# Patient Record
Sex: FEMALE | Race: WHITE | Employment: OTHER | URBAN - METROPOLITAN AREA
[De-identification: names, ages, dates, MRNs, and addresses within clinical notes are randomized per-mention and may not be internally consistent; named-entity substitution may affect disease eponyms.]

---

## 2017-01-04 ENCOUNTER — OFFICE VISIT (OUTPATIENT)
Dept: FAMILY MEDICINE CLINIC | Facility: CLINIC | Age: 77
End: 2017-01-04

## 2017-01-04 VITALS
HEART RATE: 88 BPM | SYSTOLIC BLOOD PRESSURE: 120 MMHG | RESPIRATION RATE: 14 BRPM | DIASTOLIC BLOOD PRESSURE: 66 MMHG | BODY MASS INDEX: 34 KG/M2 | WEIGHT: 198.38 LBS

## 2017-01-04 DIAGNOSIS — S80.02XA CONTUSION OF LEFT KNEE, INITIAL ENCOUNTER: Primary | ICD-10-CM

## 2017-01-04 PROBLEM — S52.614A CLOSED NONDISPLACED FRACTURE OF STYLOID PROCESS OF RIGHT ULNA, INITIAL ENCOUNTER: Status: RESOLVED | Noted: 2017-01-04 | Resolved: 2017-01-04

## 2017-01-04 PROBLEM — S52.601A CLOSED FRACTURE OF DISTAL END OF RIGHT ULNA, UNSPECIFIED FRACTURE MORPHOLOGY, INITIAL ENCOUNTER: Status: ACTIVE | Noted: 2017-01-04

## 2017-01-04 PROBLEM — S52.614A CLOSED NONDISPLACED FRACTURE OF STYLOID PROCESS OF RIGHT ULNA, INITIAL ENCOUNTER: Status: ACTIVE | Noted: 2017-01-04

## 2017-01-04 PROCEDURE — 99213 OFFICE O/P EST LOW 20 MIN: CPT | Performed by: FAMILY MEDICINE

## 2017-01-04 NOTE — PROGRESS NOTES
Patient presents with:  Knee Pain: fell last wednesday 12/28/16 and pain has been bad ever since- fractured right ulna taking Ibuprofen 600 mg - only works short term- pain keeping Pt up at night     HPI:   Mena Laughlin is a 77year old female who pr

## 2017-01-25 PROBLEM — M11.231 PSEUDOGOUT OF RIGHT WRIST: Status: ACTIVE | Noted: 2017-01-25

## 2017-02-22 PROBLEM — M65.332 TRIGGER MIDDLE FINGER OF LEFT HAND: Status: ACTIVE | Noted: 2017-02-22

## 2017-04-12 PROBLEM — G56.01 RIGHT CARPAL TUNNEL SYNDROME: Status: ACTIVE | Noted: 2017-04-12

## 2017-04-21 ENCOUNTER — PRIOR ORIGINAL RECORDS (OUTPATIENT)
Dept: OTHER | Age: 77
End: 2017-04-21

## 2017-06-16 ENCOUNTER — TELEPHONE (OUTPATIENT)
Dept: FAMILY MEDICINE CLINIC | Facility: CLINIC | Age: 77
End: 2017-06-16

## 2017-06-16 NOTE — TELEPHONE ENCOUNTER
LM for patient to call back to complete her Annual Health Assessment form prior to her appt on 6/22/17 with Dr. Thomas Hamilton

## 2017-06-22 ENCOUNTER — OFFICE VISIT (OUTPATIENT)
Dept: FAMILY MEDICINE CLINIC | Facility: CLINIC | Age: 77
End: 2017-06-22

## 2017-06-22 VITALS
TEMPERATURE: 98 F | DIASTOLIC BLOOD PRESSURE: 60 MMHG | BODY MASS INDEX: 34.27 KG/M2 | WEIGHT: 195.81 LBS | RESPIRATION RATE: 14 BRPM | SYSTOLIC BLOOD PRESSURE: 100 MMHG | HEART RATE: 88 BPM | HEIGHT: 63.2 IN

## 2017-06-22 DIAGNOSIS — E78.00 PURE HYPERCHOLESTEROLEMIA: ICD-10-CM

## 2017-06-22 DIAGNOSIS — Z80.3 FAMILY HISTORY OF BREAST CANCER IN MOTHER: ICD-10-CM

## 2017-06-22 DIAGNOSIS — Z23 NEED FOR VACCINATION: ICD-10-CM

## 2017-06-22 DIAGNOSIS — Z13.31 DEPRESSION SCREENING: ICD-10-CM

## 2017-06-22 DIAGNOSIS — M25.561 CHRONIC PAIN OF BOTH KNEES: ICD-10-CM

## 2017-06-22 DIAGNOSIS — G89.29 CHRONIC PAIN OF BOTH KNEES: ICD-10-CM

## 2017-06-22 DIAGNOSIS — Z12.31 VISIT FOR SCREENING MAMMOGRAM: ICD-10-CM

## 2017-06-22 DIAGNOSIS — M25.562 CHRONIC PAIN OF BOTH KNEES: ICD-10-CM

## 2017-06-22 DIAGNOSIS — Z00.00 ENCOUNTER FOR ANNUAL HEALTH EXAMINATION: Primary | ICD-10-CM

## 2017-06-22 PROCEDURE — G0439 PPPS, SUBSEQ VISIT: HCPCS | Performed by: FAMILY MEDICINE

## 2017-06-22 PROCEDURE — G0444 DEPRESSION SCREEN ANNUAL: HCPCS | Performed by: FAMILY MEDICINE

## 2017-06-22 RX ORDER — EFINACONAZOLE 100 MG/ML
SOLUTION TOPICAL DAILY
COMMUNITY
End: 2019-06-25 | Stop reason: ALTCHOICE

## 2017-06-22 RX ORDER — RALOXIFENE HYDROCHLORIDE 60 MG/1
60 TABLET, FILM COATED ORAL
Qty: 90 TABLET | Refills: 3 | Status: SHIPPED | OUTPATIENT
Start: 2017-06-22 | End: 2018-06-25

## 2017-06-22 RX ORDER — CELECOXIB 200 MG/1
200 CAPSULE ORAL
Qty: 90 CAPSULE | Refills: 3 | Status: SHIPPED | OUTPATIENT
Start: 2017-06-22 | End: 2017-09-30

## 2017-06-22 NOTE — PATIENT INSTRUCTIONS
Lela Espana's SCREENING SCHEDULE   Tests on this list are recommended by your physician but may not be covered, or covered at this frequency, by your insurer. Please check with your insurance carrier before scheduling to verify coverage.    PREVENT in their lifetime   • Anyone with a family history    Colorectal Cancer Screening  Covered up to Age 76     Colonoscopy Screen   Covered every 10 years- more often if abnormal Colonoscopy,10 Years due on 10/23/2024 Update [facility] if applicable 12/06/16  -FLU VACC PRSV FREE INC ANTIG   Orders placed or performed in visit on 10/20/14  -FLU VACC PRSV FREE INC ANTIG    Please get every year    Pneumococcal 13 (Prevnar)  Covered Once after 65 No orders found for this or any previous visit.  Please get

## 2017-06-22 NOTE — PROGRESS NOTES
HPI:   Odin Barrera is a 77year old female who presents for a Medicare Subsequent Annual Wellness visit (Pt already had Initial Annual Wellness). Preventative  Breast: normal a year ago. Due again. Colon: 2014. Recommend no more screening. 208* 06/23/2016          Last Chemistry Labs:     Lab Results  Component Value Date   AST 37 06/23/2016   ALT 47 06/23/2016   CA 8.9 06/23/2016   ALB 3.8 06/23/2016   TSH 4.460 06/11/2015   CREATSERUM 0.68 06/23/2016   GLU 90 06/23/2016        CBC  (most r Other in her father; Ovarian Cancer (age of onset: 61) in her maternal cousin female; Stroke in her maternal grandmother; alzheimer's in her maternal grandfather; diverticulosis in her brother. SOCIAL HISTORY:   She  reports that she has never smoked.  Sh Supple, symmetrical, trachea midline, no adenopathy, thyroid: not enlarged, symmetric, no tenderness/mass/nodules   Lungs:   Clear to auscultation bilaterally, respirations unlabored   Chest Wall:  No tenderness or deformity   Heart:  Regular rate and rhyt tablet; Refill: 3    5. Chronic pain of both knees  Worsening  Stay active  Encouraged to see ortho if the pains continue to bother her - known OA in medial bilaterally. - celecoxib (CELEBREX) 200 MG Oral Cap;  Take 1 capsule (200 mg total) by mouth once money/bills: Able without help    Taking medications as prescribed: Able without help    Are you able to afford your medications?: Yes    Hearing Problems?: No     Functional Status     Hearing Problems?: No    Vision Problems? 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

## 2017-07-03 ENCOUNTER — APPOINTMENT (OUTPATIENT)
Dept: LAB | Age: 77
End: 2017-07-03
Attending: FAMILY MEDICINE
Payer: MEDICARE

## 2017-07-03 ENCOUNTER — HOSPITAL ENCOUNTER (OUTPATIENT)
Dept: MAMMOGRAPHY | Facility: HOSPITAL | Age: 77
Discharge: HOME | End: 2017-07-03
Attending: FAMILY MEDICINE
Payer: MEDICARE

## 2017-07-03 DIAGNOSIS — E78.00 PURE HYPERCHOLESTEROLEMIA: ICD-10-CM

## 2017-07-03 DIAGNOSIS — Z12.31 VISIT FOR SCREENING MAMMOGRAM: ICD-10-CM

## 2017-07-03 LAB
ALBUMIN SERPL-MCNC: 3.9 G/DL (ref 3.5–4.8)
ALP LIVER SERPL-CCNC: 53 U/L (ref 55–142)
ALT SERPL-CCNC: 31 U/L (ref 14–54)
AST SERPL-CCNC: 28 U/L (ref 15–41)
BILIRUB SERPL-MCNC: 0.4 MG/DL (ref 0.1–2)
BUN BLD-MCNC: 14 MG/DL (ref 8–20)
CALCIUM BLD-MCNC: 9.3 MG/DL (ref 8.3–10.3)
CHLORIDE: 105 MMOL/L (ref 101–111)
CHOLEST SMN-MCNC: 241 MG/DL (ref ?–200)
CO2: 28 MMOL/L (ref 22–32)
CREAT BLD-MCNC: 0.83 MG/DL (ref 0.55–1.02)
GLUCOSE BLD-MCNC: 88 MG/DL (ref 70–99)
HDLC SERPL-MCNC: 50 MG/DL (ref 45–?)
HDLC SERPL: 4.82 {RATIO} (ref ?–4.44)
LDLC SERPL CALC-MCNC: 144 MG/DL (ref ?–130)
M PROTEIN MFR SERPL ELPH: 7.6 G/DL (ref 6.1–8.3)
NONHDLC SERPL-MCNC: 191 MG/DL (ref ?–130)
POTASSIUM SERPL-SCNC: 4.2 MMOL/L (ref 3.6–5.1)
SODIUM SERPL-SCNC: 139 MMOL/L (ref 136–144)
TRIGLYCERIDES: 236 MG/DL (ref ?–150)
VLDL: 47 MG/DL (ref 5–40)

## 2017-07-03 PROCEDURE — 36415 COLL VENOUS BLD VENIPUNCTURE: CPT

## 2017-07-03 PROCEDURE — 77067 SCR MAMMO BI INCL CAD: CPT | Performed by: FAMILY MEDICINE

## 2017-07-03 PROCEDURE — 80053 COMPREHEN METABOLIC PANEL: CPT

## 2017-07-03 PROCEDURE — 80061 LIPID PANEL: CPT

## 2017-07-17 ENCOUNTER — TELEPHONE (OUTPATIENT)
Dept: FAMILY MEDICINE CLINIC | Facility: CLINIC | Age: 77
End: 2017-07-17

## 2017-07-17 NOTE — TELEPHONE ENCOUNTER
Called Pt to inquire about the Prevnar 13 order still pending. Pt says back on 06/22/17 when Pt was here for appt, She and Dr Gabriel Lindsay discussed the vaccine, but them later Dr Gabriel Lindsay told Pt she was all done with her visit, so Pt got up and left.  Pt was unaw

## 2017-07-18 ENCOUNTER — NURSE ONLY (OUTPATIENT)
Dept: FAMILY MEDICINE CLINIC | Facility: CLINIC | Age: 77
End: 2017-07-18

## 2017-07-18 VITALS — TEMPERATURE: 99 F

## 2017-07-18 PROCEDURE — G0009 ADMIN PNEUMOCOCCAL VACCINE: HCPCS | Performed by: FAMILY MEDICINE

## 2017-07-18 PROCEDURE — 90670 PCV13 VACCINE IM: CPT | Performed by: FAMILY MEDICINE

## 2017-07-18 NOTE — PROGRESS NOTES
Patient came into the office today for the Prevnar 13 vaccine. Gave patient the IVS for the Prevnar 13 vaccine for patient to review. After patient reviewed IVS I asked patient if she has any questions regarding the Prevnar 13 vaccine.  Patient said that

## 2017-08-18 ENCOUNTER — TELEPHONE (OUTPATIENT)
Dept: FAMILY MEDICINE CLINIC | Facility: CLINIC | Age: 77
End: 2017-08-18

## 2017-08-18 NOTE — TELEPHONE ENCOUNTER
Received fax from Dr. Jose Tay office, patient having left knee replacement 09/28/17, H&P required.  Form in triage

## 2017-09-11 ENCOUNTER — OFFICE VISIT (OUTPATIENT)
Dept: FAMILY MEDICINE CLINIC | Facility: CLINIC | Age: 77
End: 2017-09-11

## 2017-09-11 VITALS
WEIGHT: 199 LBS | HEIGHT: 64 IN | SYSTOLIC BLOOD PRESSURE: 120 MMHG | HEART RATE: 84 BPM | DIASTOLIC BLOOD PRESSURE: 60 MMHG | TEMPERATURE: 98 F | BODY MASS INDEX: 33.97 KG/M2

## 2017-09-11 DIAGNOSIS — D32.9 MENINGIOMA (HCC): ICD-10-CM

## 2017-09-11 DIAGNOSIS — Z01.818 PREOP EXAMINATION: Primary | ICD-10-CM

## 2017-09-11 DIAGNOSIS — M17.12 ARTHRITIS OF LEFT KNEE: ICD-10-CM

## 2017-09-11 DIAGNOSIS — E78.00 PURE HYPERCHOLESTEROLEMIA: ICD-10-CM

## 2017-09-11 DIAGNOSIS — C83.32 DIFFUSE LARGE B-CELL LYMPHOMA OF INTRATHORACIC LYMPH NODES (HCC): ICD-10-CM

## 2017-09-11 PROCEDURE — 99214 OFFICE O/P EST MOD 30 MIN: CPT | Performed by: FAMILY MEDICINE

## 2017-09-11 NOTE — PROGRESS NOTES
Patient presents with:  Pre-Op Exam: pt neds pre op clearance for left knee replaceent on 9/28/17.   Pt will have ekg and labs done at hospital  Chest Pressure: pt wants to discuss one instance two weeks ago of chest pressure and difficulty breathing while daily. Disp: 90 capsule Rfl: 3   triamcinolone acetonide (KENALOG) 0.1 % Apply Externally Cream Apply  topically 2 (two) times daily as needed. Disp: 60 g Rfl: 1   aspirin 81 MG Oral Tab Take 81 mg by mouth daily.  Disp:  Rfl:    Omega-3 Fatty Acids (FISH O Smoking status: Never Smoker    Smokeless tobacco: Never Used    Alcohol use No    Drug use: No    Sexual activity: Not on file     Other Topics Concern    Caffeine Concern Yes    Comment: 1 cup daily decaf    Exercise No    Seat Belt Yes    Self-Exams Yes handicapped placard because knee surgeries for herself and ). 1. Preop examination  Needs preop blood work completed prior to surgical clearance  Medically her chronic conditions are controlled  Being monitored regularly for the [facility].   Gary

## 2017-09-18 ENCOUNTER — APPOINTMENT (OUTPATIENT)
Dept: LAB | Facility: HOSPITAL | Age: 77
End: 2017-09-18
Attending: ORTHOPAEDIC SURGERY
Payer: MEDICARE

## 2017-09-18 ENCOUNTER — HOSPITAL ENCOUNTER (OUTPATIENT)
Dept: PHYSICAL THERAPY | Facility: HOSPITAL | Age: 77
Discharge: HOME | End: 2017-09-18
Attending: ORTHOPAEDIC SURGERY
Payer: MEDICARE

## 2017-09-18 DIAGNOSIS — M17.12 OSTEOARTHRITIS OF LEFT KNEE: ICD-10-CM

## 2017-09-18 LAB
ALBUMIN SERPL-MCNC: 3.6 G/DL (ref 3.5–4.8)
ALP LIVER SERPL-CCNC: 57 U/L (ref 55–142)
ALT SERPL-CCNC: 29 U/L (ref 14–54)
ANTIBODY SCREEN: NEGATIVE
APTT PPP: 36.1 SECONDS (ref 25–34)
AST SERPL-CCNC: 29 U/L (ref 15–41)
ATRIAL RATE: 81 BPM
BASOPHILS # BLD AUTO: 0.05 X10(3) UL (ref 0–0.1)
BASOPHILS NFR BLD AUTO: 0.9 %
BILIRUB SERPL-MCNC: 0.4 MG/DL (ref 0.1–2)
BILIRUB UR QL STRIP.AUTO: NEGATIVE
BUN BLD-MCNC: 12 MG/DL (ref 8–20)
CALCIUM BLD-MCNC: 9.2 MG/DL (ref 8.3–10.3)
CHLORIDE: 106 MMOL/L (ref 101–111)
CO2: 24 MMOL/L (ref 22–32)
COLOR UR AUTO: YELLOW
CREAT BLD-MCNC: 0.65 MG/DL (ref 0.55–1.02)
EOSINOPHIL # BLD AUTO: 0.25 X10(3) UL (ref 0–0.3)
EOSINOPHIL NFR BLD AUTO: 4.5 %
ERYTHROCYTE [DISTWIDTH] IN BLOOD BY AUTOMATED COUNT: 12.8 % (ref 11.5–16)
GLUCOSE BLD-MCNC: 90 MG/DL (ref 70–99)
GLUCOSE UR STRIP.AUTO-MCNC: NEGATIVE MG/DL
HCT VFR BLD AUTO: 41.3 % (ref 34–50)
HGB BLD-MCNC: 13.8 G/DL (ref 12–16)
IMMATURE GRANULOCYTE COUNT: 0.02 X10(3) UL (ref 0–1)
IMMATURE GRANULOCYTE RATIO %: 0.4 %
INR BLD: 1.05 (ref 0.89–1.11)
KETONES UR STRIP.AUTO-MCNC: NEGATIVE MG/DL
LYMPHOCYTES # BLD AUTO: 1.29 X10(3) UL (ref 0.9–4)
LYMPHOCYTES NFR BLD AUTO: 23.3 %
M PROTEIN MFR SERPL ELPH: 7.3 G/DL (ref 6.1–8.3)
MCH RBC QN AUTO: 30.6 PG (ref 27–33.2)
MCHC RBC AUTO-ENTMCNC: 33.4 G/DL (ref 31–37)
MCV RBC AUTO: 91.6 FL (ref 81–100)
MONOCYTES # BLD AUTO: 0.32 X10(3) UL (ref 0.1–0.6)
MONOCYTES NFR BLD AUTO: 5.8 %
NEUTROPHIL ABS PRELIM: 3.6 X10 (3) UL (ref 1.3–6.7)
NEUTROPHILS # BLD AUTO: 3.6 X10(3) UL (ref 1.3–6.7)
NEUTROPHILS NFR BLD AUTO: 65.1 %
NITRITE UR QL STRIP.AUTO: NEGATIVE
P AXIS: 31 DEGREES
P-R INTERVAL: 150 MS
PH UR STRIP.AUTO: 6 [PH] (ref 4.5–8)
PLATELET # BLD AUTO: 191 10(3)UL (ref 150–450)
POTASSIUM SERPL-SCNC: 4.1 MMOL/L (ref 3.6–5.1)
PROT UR STRIP.AUTO-MCNC: NEGATIVE MG/DL
PSA SERPL DL<=0.01 NG/ML-MCNC: 13.7 SECONDS (ref 12–14.3)
Q-T INTERVAL: 382 MS
QRS DURATION: 84 MS
QTC CALCULATION (BEZET): 443 MS
R AXIS: -10 DEGREES
RBC # BLD AUTO: 4.51 X10(6)UL (ref 3.8–5.1)
RBC UR QL AUTO: NEGATIVE
RED CELL DISTRIBUTION WIDTH-SD: 43.1 FL (ref 35.1–46.3)
RH BLOOD TYPE: NEGATIVE
SODIUM SERPL-SCNC: 138 MMOL/L (ref 136–144)
SP GR UR STRIP.AUTO: 1.02 (ref 1–1.03)
T AXIS: 41 DEGREES
UROBILINOGEN UR STRIP.AUTO-MCNC: <2 MG/DL
VENTRICULAR RATE: 81 BPM
WBC # BLD AUTO: 5.5 X10(3) UL (ref 4–13)

## 2017-09-18 PROCEDURE — 85025 COMPLETE CBC W/AUTO DIFF WBC: CPT

## 2017-09-18 PROCEDURE — 86901 BLOOD TYPING SEROLOGIC RH(D): CPT

## 2017-09-18 PROCEDURE — 85610 PROTHROMBIN TIME: CPT

## 2017-09-18 PROCEDURE — 36415 COLL VENOUS BLD VENIPUNCTURE: CPT

## 2017-09-18 PROCEDURE — 85730 THROMBOPLASTIN TIME PARTIAL: CPT

## 2017-09-18 PROCEDURE — 87081 CULTURE SCREEN ONLY: CPT

## 2017-09-18 PROCEDURE — 86850 RBC ANTIBODY SCREEN: CPT

## 2017-09-18 PROCEDURE — 87086 URINE CULTURE/COLONY COUNT: CPT

## 2017-09-18 PROCEDURE — 93005 ELECTROCARDIOGRAM TRACING: CPT

## 2017-09-18 PROCEDURE — 80053 COMPREHEN METABOLIC PANEL: CPT

## 2017-09-18 PROCEDURE — 81001 URINALYSIS AUTO W/SCOPE: CPT

## 2017-09-18 PROCEDURE — 93010 ELECTROCARDIOGRAM REPORT: CPT | Performed by: INTERNAL MEDICINE

## 2017-09-18 PROCEDURE — 86900 BLOOD TYPING SEROLOGIC ABO: CPT

## 2017-09-19 PROBLEM — M17.12 PRIMARY OSTEOARTHRITIS OF LEFT KNEE: Status: ACTIVE | Noted: 2017-09-19

## 2017-09-27 NOTE — H&P
[address] [facility] Patient Status:  Surgery Admit    1940 MRN [id number]   [facility] SURGERY Attending Justice Hernandez MD   Hosp Day # 0 PCP Moe Godfrey MD     Date of Admission:  (Not on smoked. She has never used smokeless tobacco. She reports that she does not drink alcohol or use drugs.     Allergies:    Cleocin [Clindamyci*    Diarrhea  Adhesive Tape           Rash  Ampicillin              Rash  Neosporin [Neomycin*    Rash    Home Medi

## 2017-09-28 ENCOUNTER — SURGERY (OUTPATIENT)
Age: 77
End: 2017-09-28

## 2017-09-28 ENCOUNTER — HOSPITAL ENCOUNTER (INPATIENT)
Facility: HOSPITAL | Age: 77
LOS: 2 days | Discharge: HOME HEALTH | DRG: 470 | End: 2017-09-30
Attending: ORTHOPAEDIC SURGERY | Admitting: ORTHOPAEDIC SURGERY
Payer: MEDICARE

## 2017-09-28 DIAGNOSIS — M17.12 OSTEOARTHRITIS OF LEFT KNEE: Primary | ICD-10-CM

## 2017-09-28 PROCEDURE — 99233 SBSQ HOSP IP/OBS HIGH 50: CPT | Performed by: HOSPITALIST

## 2017-09-28 PROCEDURE — 3E0T3CZ INTRODUCTION OF REGIONAL ANESTHETIC INTO PERIPHERAL NERVES AND PLEXI, PERCUTANEOUS APPROACH: ICD-10-PCS | Performed by: ANESTHESIOLOGY

## 2017-09-28 PROCEDURE — 0SRD0J9 REPLACEMENT OF LEFT KNEE JOINT WITH SYNTHETIC SUBSTITUTE, CEMENTED, OPEN APPROACH: ICD-10-PCS | Performed by: ORTHOPAEDIC SURGERY

## 2017-09-28 DEVICE — SIGMA LCS HIGH PERFORMANCE STERILE THREADED HEADED PINS
Type: IMPLANTABLE DEVICE | Site: KNEE | Status: FUNCTIONAL
Brand: SIGMA LCS HIGH PERFORMANCE

## 2017-09-28 DEVICE — ATTUNE PATELLA MEDIALIZED DOME 38MM CEMENTED AOX
Type: IMPLANTABLE DEVICE | Site: KNEE | Status: FUNCTIONAL
Brand: ATTUNE

## 2017-09-28 DEVICE — SIGMA LCS HIGH PERFORMANCE INSTRUMENTS STERILE THREADED PINS
Type: IMPLANTABLE DEVICE | Site: KNEE | Status: FUNCTIONAL
Brand: SIGMA LCS HIGH PERFORMANCE

## 2017-09-28 DEVICE — ATTUNE KNEE SYSTEM FEMORAL POSTERIOR STABILIZED SIZE 6 LEFT CEMENTED
Type: IMPLANTABLE DEVICE | Site: KNEE | Status: FUNCTIONAL
Brand: ATTUNE

## 2017-09-28 DEVICE — SMARTSET HV HIGH VISCOSITY BONE CEMENT 40G
Type: IMPLANTABLE DEVICE | Site: KNEE | Status: FUNCTIONAL
Brand: SMARTSET

## 2017-09-28 DEVICE — ATTUNE KNEE SYSTEM TIBIAL INSERT ROTATING PLATFORM POSTERIOR STABILIZED 6 7MM AOX
Type: IMPLANTABLE DEVICE | Site: KNEE | Status: FUNCTIONAL
Brand: ATTUNE

## 2017-09-28 DEVICE — ATTUNE KNEE SYSTEM TIBIAL BASE ROTATING PLATFORM SIZE 6 CEMENTED
Type: IMPLANTABLE DEVICE | Site: KNEE | Status: FUNCTIONAL
Brand: ATTUNE

## 2017-09-28 RX ORDER — HYDROMORPHONE HYDROCHLORIDE 1 MG/ML
0.4 INJECTION, SOLUTION INTRAMUSCULAR; INTRAVENOUS; SUBCUTANEOUS EVERY 2 HOUR PRN
Status: ACTIVE | OUTPATIENT
Start: 2017-09-28 | End: 2017-09-30

## 2017-09-28 RX ORDER — SODIUM PHOSPHATE, DIBASIC AND SODIUM PHOSPHATE, MONOBASIC 7; 19 G/133ML; G/133ML
1 ENEMA RECTAL ONCE AS NEEDED
Status: DISCONTINUED | OUTPATIENT
Start: 2017-09-28 | End: 2017-09-30

## 2017-09-28 RX ORDER — MELATONIN
325
Status: DISCONTINUED | OUTPATIENT
Start: 2017-09-29 | End: 2017-09-30

## 2017-09-28 RX ORDER — OXYCODONE HCL 10 MG/1
10 TABLET, FILM COATED, EXTENDED RELEASE ORAL
Status: COMPLETED | OUTPATIENT
Start: 2017-09-28 | End: 2017-09-28

## 2017-09-28 RX ORDER — ONDANSETRON 2 MG/ML
4 INJECTION INTRAMUSCULAR; INTRAVENOUS EVERY 4 HOURS PRN
Status: DISCONTINUED | OUTPATIENT
Start: 2017-09-28 | End: 2017-09-30

## 2017-09-28 RX ORDER — METOCLOPRAMIDE HYDROCHLORIDE 5 MG/ML
10 INJECTION INTRAMUSCULAR; INTRAVENOUS AS NEEDED
Status: DISCONTINUED | OUTPATIENT
Start: 2017-09-28 | End: 2017-09-28 | Stop reason: HOSPADM

## 2017-09-28 RX ORDER — MEPERIDINE HYDROCHLORIDE 25 MG/ML
12.5 INJECTION INTRAMUSCULAR; INTRAVENOUS; SUBCUTANEOUS AS NEEDED
Status: COMPLETED | OUTPATIENT
Start: 2017-09-28 | End: 2017-09-28

## 2017-09-28 RX ORDER — ONDANSETRON 2 MG/ML
4 INJECTION INTRAMUSCULAR; INTRAVENOUS AS NEEDED
Status: DISCONTINUED | OUTPATIENT
Start: 2017-09-28 | End: 2017-09-28 | Stop reason: HOSPADM

## 2017-09-28 RX ORDER — POLYETHYLENE GLYCOL 3350 17 G/17G
17 POWDER, FOR SOLUTION ORAL DAILY PRN
Status: DISCONTINUED | OUTPATIENT
Start: 2017-09-28 | End: 2017-09-30

## 2017-09-28 RX ORDER — BISACODYL 10 MG
10 SUPPOSITORY, RECTAL RECTAL
Status: DISCONTINUED | OUTPATIENT
Start: 2017-09-28 | End: 2017-09-30

## 2017-09-28 RX ORDER — DIPHENHYDRAMINE HYDROCHLORIDE 50 MG/ML
12.5 INJECTION INTRAMUSCULAR; INTRAVENOUS EVERY 4 HOURS PRN
Status: DISCONTINUED | OUTPATIENT
Start: 2017-09-28 | End: 2017-09-30

## 2017-09-28 RX ORDER — SENNOSIDES 8.6 MG
17.2 TABLET ORAL NIGHTLY
Status: DISCONTINUED | OUTPATIENT
Start: 2017-09-28 | End: 2017-09-30

## 2017-09-28 RX ORDER — METOCLOPRAMIDE HYDROCHLORIDE 5 MG/ML
10 INJECTION INTRAMUSCULAR; INTRAVENOUS EVERY 6 HOURS PRN
Status: ACTIVE | OUTPATIENT
Start: 2017-09-28 | End: 2017-09-30

## 2017-09-28 RX ORDER — HYDROCODONE BITARTRATE AND ACETAMINOPHEN 10; 325 MG/1; MG/1
1-2 TABLET ORAL EVERY 4 HOURS PRN
Qty: 80 TABLET | Refills: 0 | Status: SHIPPED | OUTPATIENT
Start: 2017-09-28 | End: 2018-06-25

## 2017-09-28 RX ORDER — DIPHENHYDRAMINE HYDROCHLORIDE 50 MG/ML
25 INJECTION INTRAMUSCULAR; INTRAVENOUS ONCE AS NEEDED
Status: ACTIVE | OUTPATIENT
Start: 2017-09-28 | End: 2017-09-28

## 2017-09-28 RX ORDER — SODIUM CHLORIDE, SODIUM LACTATE, POTASSIUM CHLORIDE, CALCIUM CHLORIDE 600; 310; 30; 20 MG/100ML; MG/100ML; MG/100ML; MG/100ML
INJECTION, SOLUTION INTRAVENOUS CONTINUOUS
Status: DISCONTINUED | OUTPATIENT
Start: 2017-09-28 | End: 2017-09-30

## 2017-09-28 RX ORDER — OXYCODONE HYDROCHLORIDE 15 MG/1
15 TABLET ORAL EVERY 4 HOURS PRN
Status: DISCONTINUED | OUTPATIENT
Start: 2017-09-28 | End: 2017-09-29

## 2017-09-28 RX ORDER — ACETAMINOPHEN 500 MG
500 TABLET ORAL EVERY 6 HOURS PRN
COMMUNITY
End: 2017-10-04

## 2017-09-28 RX ORDER — NALOXONE HYDROCHLORIDE 0.4 MG/ML
80 INJECTION, SOLUTION INTRAMUSCULAR; INTRAVENOUS; SUBCUTANEOUS AS NEEDED
Status: DISCONTINUED | OUTPATIENT
Start: 2017-09-28 | End: 2017-09-28 | Stop reason: HOSPADM

## 2017-09-28 RX ORDER — DIPHENHYDRAMINE HCL 25 MG
25 CAPSULE ORAL EVERY 4 HOURS PRN
Status: DISCONTINUED | OUTPATIENT
Start: 2017-09-28 | End: 2017-09-30

## 2017-09-28 RX ORDER — OXYCODONE HCL 10 MG/1
10 TABLET, FILM COATED, EXTENDED RELEASE ORAL
Status: DISCONTINUED | OUTPATIENT
Start: 2017-09-28 | End: 2017-09-29

## 2017-09-28 RX ORDER — OXYCODONE HYDROCHLORIDE 5 MG/1
5 TABLET ORAL EVERY 4 HOURS PRN
Status: DISCONTINUED | OUTPATIENT
Start: 2017-09-28 | End: 2017-09-29

## 2017-09-28 RX ORDER — DEXAMETHASONE SODIUM PHOSPHATE 4 MG/ML
4 VIAL (ML) INJECTION AS NEEDED
Status: DISCONTINUED | OUTPATIENT
Start: 2017-09-28 | End: 2017-09-28 | Stop reason: HOSPADM

## 2017-09-28 RX ORDER — HYDROMORPHONE HYDROCHLORIDE 1 MG/ML
0.4 INJECTION, SOLUTION INTRAMUSCULAR; INTRAVENOUS; SUBCUTANEOUS EVERY 5 MIN PRN
Status: DISCONTINUED | OUTPATIENT
Start: 2017-09-28 | End: 2017-09-28 | Stop reason: HOSPADM

## 2017-09-28 RX ORDER — KETOROLAC TROMETHAMINE 30 MG/ML
15 INJECTION, SOLUTION INTRAMUSCULAR; INTRAVENOUS EVERY 6 HOURS
Status: COMPLETED | OUTPATIENT
Start: 2017-09-28 | End: 2017-09-29

## 2017-09-28 RX ORDER — DOCUSATE SODIUM 100 MG/1
100 CAPSULE, LIQUID FILLED ORAL 2 TIMES DAILY
Status: DISCONTINUED | OUTPATIENT
Start: 2017-09-28 | End: 2017-09-30

## 2017-09-28 RX ORDER — HYDROMORPHONE HYDROCHLORIDE 1 MG/ML
0.2 INJECTION, SOLUTION INTRAMUSCULAR; INTRAVENOUS; SUBCUTANEOUS EVERY 2 HOUR PRN
Status: ACTIVE | OUTPATIENT
Start: 2017-09-28 | End: 2017-09-30

## 2017-09-28 RX ORDER — ACETAMINOPHEN 325 MG/1
TABLET ORAL
Status: COMPLETED
Start: 2017-09-28 | End: 2017-09-28

## 2017-09-28 RX ORDER — OXYCODONE HYDROCHLORIDE 10 MG/1
10 TABLET ORAL EVERY 4 HOURS PRN
Status: DISCONTINUED | OUTPATIENT
Start: 2017-09-28 | End: 2017-09-29

## 2017-09-28 RX ORDER — SCOLOPAMINE TRANSDERMAL SYSTEM 1 MG/1
1 PATCH, EXTENDED RELEASE TRANSDERMAL ONCE
Status: DISCONTINUED | OUTPATIENT
Start: 2017-09-28 | End: 2017-09-30

## 2017-09-28 RX ORDER — ACETAMINOPHEN 325 MG/1
650 TABLET ORAL 4 TIMES DAILY
Status: DISCONTINUED | OUTPATIENT
Start: 2017-09-28 | End: 2017-09-29

## 2017-09-28 RX ORDER — CYCLOBENZAPRINE HCL 5 MG
5 TABLET ORAL EVERY 8 HOURS PRN
Status: DISCONTINUED | OUTPATIENT
Start: 2017-09-28 | End: 2017-09-30

## 2017-09-28 RX ORDER — OXYCODONE HCL 10 MG/1
10 TABLET, FILM COATED, EXTENDED RELEASE ORAL
Status: ACTIVE | OUTPATIENT
Start: 2017-09-28 | End: 2017-09-29

## 2017-09-28 RX ORDER — MIDAZOLAM HYDROCHLORIDE 1 MG/ML
1 INJECTION INTRAMUSCULAR; INTRAVENOUS EVERY 5 MIN PRN
Status: DISCONTINUED | OUTPATIENT
Start: 2017-09-28 | End: 2017-09-28 | Stop reason: HOSPADM

## 2017-09-28 RX ORDER — ZOLPIDEM TARTRATE 5 MG/1
5 TABLET ORAL NIGHTLY PRN
Status: DISCONTINUED | OUTPATIENT
Start: 2017-09-28 | End: 2017-09-29

## 2017-09-28 RX ORDER — RALOXIFENE HYDROCHLORIDE 60 MG/1
60 TABLET, FILM COATED ORAL
Status: DISCONTINUED | OUTPATIENT
Start: 2017-09-28 | End: 2017-09-30

## 2017-09-28 RX ORDER — HYDROMORPHONE HYDROCHLORIDE 1 MG/ML
0.8 INJECTION, SOLUTION INTRAMUSCULAR; INTRAVENOUS; SUBCUTANEOUS EVERY 2 HOUR PRN
Status: DISCONTINUED | OUTPATIENT
Start: 2017-09-28 | End: 2017-09-29

## 2017-09-28 RX ORDER — HYDROMORPHONE HYDROCHLORIDE 1 MG/ML
INJECTION, SOLUTION INTRAMUSCULAR; INTRAVENOUS; SUBCUTANEOUS
Status: COMPLETED
Start: 2017-09-28 | End: 2017-09-28

## 2017-09-28 RX ORDER — DIPHENHYDRAMINE HYDROCHLORIDE 50 MG/ML
12.5 INJECTION INTRAMUSCULAR; INTRAVENOUS AS NEEDED
Status: DISCONTINUED | OUTPATIENT
Start: 2017-09-28 | End: 2017-09-28 | Stop reason: HOSPADM

## 2017-09-28 RX ORDER — CYCLOBENZAPRINE HCL 5 MG
5 TABLET ORAL 3 TIMES DAILY PRN
Status: DISCONTINUED | OUTPATIENT
Start: 2017-09-28 | End: 2017-09-28

## 2017-09-28 RX ORDER — ACETAMINOPHEN 325 MG/1
650 TABLET ORAL ONCE
Status: COMPLETED | OUTPATIENT
Start: 2017-09-28 | End: 2017-09-28

## 2017-09-28 RX ORDER — MEPERIDINE HYDROCHLORIDE 25 MG/ML
INJECTION INTRAMUSCULAR; INTRAVENOUS; SUBCUTANEOUS
Status: COMPLETED
Start: 2017-09-28 | End: 2017-09-28

## 2017-09-28 NOTE — OPERATIVE REPORT
PREOPERATIVE DIAGNOSIS: Left knee osteoarthritis. POSTOPERATIVE DIAGNOSIS: Left knee osteoarthritis. PROCEDURE PERFORMED: Cemented left total knee arthroplasty. SURGEON:  Jameel Maya M.D. FIRST ASSISTANT:  Dereje Borja PA-C.    ANESTHESIA: Spinal the distal femoral condyles in the trial template. Trial components were removed. Bony surfaces were irrigated and dried. Final components were precoated with cement which had been mixed under vacuum conditions. The bony surfaces were precoated as well.  Co

## 2017-09-28 NOTE — CONSULTS
[facility]  [address] Patient Status:  Inpatient    1940 MRN [id number]   [facility] 3SW-A Attending Kandace Whyte MD   Hosp Day # 0 PCP Susan Jovel MD     Reason for consult: NHL  Requested by: smokeless tobacco. She reports that she does not drink alcohol or use drugs.   Family History:   Family History   Problem Relation Age of Onset   • Breast Cancer Mother 59     B/L, age 59 then 77   • Heart Attack Father    • Other Pravin Fleischer Father    • Stroke bruits. Respiratory: Clear to auscultation bilaterally. No wheezes. No rhonchi. Cardiovascular: S1, S2. Regular rhythm, regular rate. No murmurs, rubs or gallops. Equal pulses. Chest and Back: No tenderness or deformity.   Abdomen: Soft, nontender, nond

## 2017-09-28 NOTE — PROGRESS NOTES
NURSING ADMISSION NOTE      Patient admitted via BED  Oriented to room. Safety precautions initiated. Bed in low position. Call light in reach.

## 2017-09-29 PROBLEM — M17.12 OSTEOARTHRITIS OF LEFT KNEE: Status: ACTIVE | Noted: 2017-09-19

## 2017-09-29 RX ORDER — TRAMADOL HYDROCHLORIDE 50 MG/1
100 TABLET ORAL EVERY 4 HOURS PRN
Status: DISCONTINUED | OUTPATIENT
Start: 2017-09-29 | End: 2017-09-30

## 2017-09-29 RX ORDER — ACETAMINOPHEN 325 MG/1
650 TABLET ORAL EVERY 4 HOURS PRN
Status: DISCONTINUED | OUTPATIENT
Start: 2017-09-29 | End: 2017-09-30

## 2017-09-29 RX ORDER — HYDROCODONE BITARTRATE AND ACETAMINOPHEN 5; 325 MG/1; MG/1
2 TABLET ORAL EVERY 4 HOURS PRN
Status: DISCONTINUED | OUTPATIENT
Start: 2017-09-29 | End: 2017-09-30

## 2017-09-29 RX ORDER — TRAMADOL HYDROCHLORIDE 50 MG/1
50 TABLET ORAL EVERY 4 HOURS PRN
Status: DISCONTINUED | OUTPATIENT
Start: 2017-09-29 | End: 2017-09-30

## 2017-09-29 RX ORDER — POLYETHYLENE GLYCOL 3350 17 G/17G
17 POWDER, FOR SOLUTION ORAL DAILY PRN
Qty: 10 EACH | Refills: 0 | Status: SHIPPED | COMMUNITY
Start: 2017-09-29 | End: 2018-06-25

## 2017-09-29 RX ORDER — PSEUDOEPHEDRINE HCL 30 MG
TABLET ORAL
Qty: 60 CAPSULE | Refills: 0 | Status: SHIPPED | COMMUNITY
Start: 2017-09-29 | End: 2018-06-25

## 2017-09-29 RX ORDER — HYDROCODONE BITARTRATE AND ACETAMINOPHEN 5; 325 MG/1; MG/1
1 TABLET ORAL EVERY 4 HOURS PRN
Status: DISCONTINUED | OUTPATIENT
Start: 2017-09-29 | End: 2017-09-30

## 2017-09-29 NOTE — PHYSICAL THERAPY NOTE
PHYSICAL THERAPY KNEE EVALUATION - INPATIENT     Room Number: 354/354-A  Evaluation Date: 9/29/2017  Type of Evaluation: Initial  Physician Order: PT Eval and Treat    Presenting Problem: S/p Cemented Left TKA on 09/28/17  Reason for Therapy: Mobility Dysf in 2 story home. Was independent with ADLs & self care. Ambulated with rollator for past 2-3 weeks.  will be able to assist as needed during recovery @ home. SUBJECTIVE  \"I have not been up much. I feel dizzy. \" Patient was participatory & motivate Moving to and from a bed to a chair (including a wheelchair)?: A Little   -   Need to walk in hospital room?: A Little   -   Climbing 3-5 steps with a railing?: A Little       AM-PAC Score:  Raw Score: 18   PT Approx Degree of Impairment Score: 46.58%   St completed include AM PAC scores. Based on this evaluation, patient's clinical presentation is stable and overall the evaluation complexity is considered low.   These impairments and comorbidities manifest themselves as functional limitations in independent

## 2017-09-29 NOTE — PHYSICAL THERAPY NOTE
PHYSICAL THERAPY KNEE TREATMENT NOTE - INPATIENT     Room Number: 354/354-A     Session: 1 and 2   Number of Visits to Meet Established Goals: 5    Presenting Problem: S/p Cemented Left TKA on 09/28/17    Problem List  Active Problems:    Pure hypercholes techniques;Relaxation;Repositioning    BALANCE  Static Sitting: Good  Dynamic Sitting: Good  Static Standing: Fair -  Dynamic Standing: Fair -    ACTIVITY TOLERANCE  O2 Saturation: 93%  Room air  No shortness of breath  Blood Pressure: 111/54    AM-PAC '6- 0 reps 20 reps   Sitting Knee Flexion 0 reps 20 reps   Standing heel/toe raises 0 reps 20 reps   Standing knee flexion 0 reps 20 reps   Extension stretch  1x 1x     Comments: Pt participated in PM group session, tolerance was good.     was present: yes

## 2017-09-29 NOTE — PROGRESS NOTES
Post Op Day 1 Ortho Note    Status Post Nerve Block:  Type of Nerve Block: Left adductor canal  Single Injection Nerve Block    Post op review: No evidence of immediate block related complications, No paresthesia noted, Able to lift leg(s), Able to plantar

## 2017-09-29 NOTE — PROGRESS NOTES
[facility] 130 Patient Status:  Inpatient    1940 MRN [id number]   [facility] 3SW-A Attending You Stephens MD   [facility] Day # 1 PCP Donna Escamilla MD     Subjective:  S/P LEFT Total Knee Arthroplasty  Systemi

## 2017-09-29 NOTE — CM/SW NOTE
09/29/17 1700   CM/SW Referral Data   Referral Source Physician   Reason for Referral Discharge planning   Informant Patient;Spouse;Edward Staff   Pertinent Medical Hx   Primary Care Physician Name Paulino Edge MD   Patient Info   Patient's Mental Status A

## 2017-09-29 NOTE — OCCUPATIONAL THERAPY NOTE
OCCUPATIONAL THERAPY EVALUATION - INPATIENT     Room Number: 354/354-A  Evaluation Date: 9/29/2017  Type of Evaluation: Initial  Presenting Problem:  (L TKA)    Physician Order: IP Consult to Occupational Therapy  Reason for Therapy: ADL/IADL Dysfunction a (RW)    Occupation/Status:  (retired nurse)  Hand Dominance: Right  Drives: Yes  Patient Regularly Uses: Glasses    Prior Level of Function: Independent, drives    SUBJECTIVE   \"Are we going to walk? \"    Patient self-stated goal is to feel better, get st (G-Code): CEASAR    FUNCTIONAL TRANSFER ASSESSMENT  Supine to Sit : Supervision  Sit to Stand: Supervision    Skilled Therapy Provided: OT educated pt in role of OT and goals for session. Bed mobility with supervision.  LB dressing with min A for threading LLE discharge. Recommend home with family assistance.        Patient Complexity  Occupational Profile/Medical History LOW - Brief history including review of medical or therapy records    Specific performance deficits impacting engagement in ADL/IADL LOW  1

## 2017-09-29 NOTE — HOME CARE LIAISON
MET WITH PTNT AND OFFERED CHOICE  OF AGENCIES. PTNT AGREEABLE TO [facility]. MET WITH PTNT TO DISCUSS HOME HEALTH SERVICES AND COVERAGE CRITERIA. PTNT AGREEABLE TO [facility]. PTNT GIVEN RESIDENTIAL BROCHURE.  RESIDENTIAL WITH PROVIDE SN/PT ON DISC

## 2017-09-30 VITALS
RESPIRATION RATE: 16 BRPM | HEIGHT: 64 IN | HEART RATE: 94 BPM | WEIGHT: 199.94 LBS | BODY MASS INDEX: 34.13 KG/M2 | SYSTOLIC BLOOD PRESSURE: 110 MMHG | OXYGEN SATURATION: 97 % | DIASTOLIC BLOOD PRESSURE: 65 MMHG | TEMPERATURE: 98 F

## 2017-09-30 NOTE — PHYSICAL THERAPY NOTE
PHYSICAL THERAPY KNEE TREATMENT NOTE - INPATIENT     Room Number: 354/354-A     Session: 3   Number of Visits to Meet Established Goals: 5    Presenting Problem: S/p Cemented Left TKA on 09/28/17    Problem List  Active Problems:    Pure hypercholesterole Good  Dynamic Sitting: Good  Static Standing: Fair  Dynamic Standing: Fair    ACTIVITY TOLERANCE  Room air  No shortness of breath    AM-PAC '6-Clicks' INPATIENT SHORT FORM - BASIC MOBILITY  How much difficulty does the patient currently have. ..  -   Atiya León heel/toe raises 20 reps   Standing knee flexion 20 reps   Extension stretch  1x     Comments: Pt participated in group session, tolerance was good.    was present yes   is a spouse    Knee ROM      L Knee Flexion (degrees): 77     L Knee Extension ambulate 300 feet with assistive device at assistance level:Supervision Goal met 9/29    Goal #4  Patient will negotiate 4 stairs/one curb w/ assistive device and supervision    Goal #5  AROM 0 degrees extension to 95 degrees flexion     Progressing toward

## 2017-09-30 NOTE — PROGRESS NOTES
[facility] 130 Patient Status:  Inpatient    1940 MRN [id number]   [facility] 3SW-A Attending Renata Dove MD   [facility] Day # 2 PCP Tiny Francisco MD     Subjective:  S/P LEFT Total Knee Arthroplasty POD #2

## 2017-09-30 NOTE — PROGRESS NOTES
Acute Pain Service    Post Op Day 2 Ortho Note    Assessed patient in chair. Patient rates pain 2/10 at rest and 3-4/10 with activity. Patient states Avinash Whitley is working well to manage pain; denies itching/nausea/dizziness.     Patient able to bear weight on s

## 2017-09-30 NOTE — OCCUPATIONAL THERAPY NOTE
OCCUPATIONAL THERAPY TREATMENT NOTE - INPATIENT     Room Number: 354/354-A  Session: 1   Number of Visits to Meet Established Goals: 1    Presenting Problem:  (L TKA)    History related to current admission: L TKR on 9/28    Problem List  Active Problems: Short Form  How much help from another person does the patient currently need…  -   Putting on and taking off regular lower body clothing?: A Little (supervision)  -   Bathing (including washing, rinsing, drying)?: A Little  -   Toileting, which includes u transfer to/from toilet with supervision and no cues.  -met

## 2017-10-02 ENCOUNTER — TELEPHONE (OUTPATIENT)
Dept: FAMILY MEDICINE CLINIC | Facility: CLINIC | Age: 77
End: 2017-10-02

## 2017-10-02 ENCOUNTER — PATIENT OUTREACH (OUTPATIENT)
Dept: CASE MANAGEMENT | Age: 77
End: 2017-10-02

## 2017-10-02 DIAGNOSIS — Z02.9 ENCOUNTERS FOR ADMINISTRATIVE PURPOSE: ICD-10-CM

## 2017-10-02 NOTE — PROGRESS NOTES
Initial Post Discharge Follow Up   Discharge Date: 9/30/17  Contact Date: 10/2/2017    Consent Verification:  Assessment Completed With: Patient  HIPAA Verified?   Yes    Discharge Dx:   Left Knee Osteoarthritis, Left TKA    General:   • How have you bee medication. Take twice daily while you are taking pain medications. Disp: 60 capsule Rfl: 0   PEG 3350 Oral Powd Pack Take 17 g by mouth daily as needed. Over-the-counter medication. Recommend that you take daily until you have a bowel movement.  Then take Health ordered at D/C? Yes   Has HH been set up? Yes   If Yes: [facility] Whom[facility]   When:Pt saw both RN and PT on Sunday. DME ordered at D/C? Yes   What? Walker   Have you received your (DME)? yes    Services ordered at D/C?   Yes   What servi 19, 2017 10:15 AM CDT MEDICARE REHAB with George Rizzo, PT [facility] ([facility])    Oct 23, 2017 10:00 AM CDT MEDICARE REHAB with George Rizzo, PT [facility] PHYSICAL THERAPY [facility] 10:30 AM CST MEDICARE REHAB with Uyen Romero, PT [address] [facility] ([place] )    Nov 22, 2017 10:15 AM CST MEDICARE REHAB with Uyen Romero, YARIEL [facility] (Jazzmine Stack orders sent to PCP.

## 2017-10-02 NOTE — TELEPHONE ENCOUNTER
Sunni Sanders from [facility] is calling to confirm Dr. Priscilla Day will be signing orders for [facility] nursing and physical therapy orders.

## 2017-10-02 NOTE — DISCHARGE SUMMARY
[facility]  Discharge Summary    Santo Bagley Patient Status:  Inpatient    1940 MRN [id number]   [facility] 3SW-A Attending No att. providers found   Hosp Day # 2 PCP Tiny Francisco MD     Date of Admission: 2017 Dis EXTRA STRENGTH     Calcium 500 MG Chew     JUBLIA 10 % Soln  Generic drug:  Efinaconazole     METAMUCIL 48.57 % Powd  Generic drug:  Psyllium     omega-3 fatty acids 1000 MG Caps  Commonly known as:  FISH OIL     Raloxifene HCl 60 MG Tabs  Commonly known a

## 2017-10-03 ENCOUNTER — HOSPITAL ENCOUNTER (OUTPATIENT)
Dept: ULTRASOUND IMAGING | Facility: HOSPITAL | Age: 77
Discharge: HOME | End: 2017-10-03
Attending: FAMILY MEDICINE
Payer: MEDICARE

## 2017-10-03 ENCOUNTER — TELEPHONE (OUTPATIENT)
Dept: FAMILY MEDICINE CLINIC | Facility: CLINIC | Age: 77
End: 2017-10-03

## 2017-10-03 DIAGNOSIS — M79.89 SWELLING OF LEFT LOWER EXTREMITY: ICD-10-CM

## 2017-10-03 DIAGNOSIS — M79.662 PAIN OF LEFT CALF: ICD-10-CM

## 2017-10-03 DIAGNOSIS — M79.662 PAIN OF LEFT CALF: Primary | ICD-10-CM

## 2017-10-03 PROCEDURE — 93971 EXTREMITY STUDY: CPT | Performed by: FAMILY MEDICINE

## 2017-10-03 NOTE — TELEPHONE ENCOUNTER
Dr Priscilla Day received a call from pt that she has redness, swelling and pain in left calf. Pt had left total knee replacement on 9/28/17. Dr Priscilla Day ordered stat US  left lower leg. Order entered.  Scheduled for 8 pm at [facility].Pt notified a

## 2017-10-04 ENCOUNTER — OFFICE VISIT (OUTPATIENT)
Dept: FAMILY MEDICINE CLINIC | Facility: CLINIC | Age: 77
End: 2017-10-04

## 2017-10-04 VITALS
WEIGHT: 202 LBS | HEART RATE: 96 BPM | SYSTOLIC BLOOD PRESSURE: 110 MMHG | RESPIRATION RATE: 16 BRPM | TEMPERATURE: 98 F | BODY MASS INDEX: 34.49 KG/M2 | DIASTOLIC BLOOD PRESSURE: 60 MMHG | HEIGHT: 64 IN

## 2017-10-04 DIAGNOSIS — M17.12 PRIMARY OSTEOARTHRITIS OF LEFT KNEE: Primary | ICD-10-CM

## 2017-10-04 DIAGNOSIS — M79.89 SWELLING OF LEFT LOWER EXTREMITY: ICD-10-CM

## 2017-10-04 DIAGNOSIS — Z96.652 STATUS POST TOTAL LEFT KNEE REPLACEMENT: ICD-10-CM

## 2017-10-04 DIAGNOSIS — M79.662 PAIN OF LEFT CALF: ICD-10-CM

## 2017-10-04 PROCEDURE — 99495 TRANSJ CARE MGMT MOD F2F 14D: CPT | Performed by: FAMILY MEDICINE

## 2017-10-04 NOTE — PROGRESS NOTES
Patient presents with:  Hospital F/U: follow up after left knee replacement, pt has not had bowel movement since surgery      HPI:    Ernestina Sheth is a 69year old female here today for hospital follow up.     Discharge Date: 9/30/17  [address] [place] [address] twice daily while you are taking pain medications. Disp: 60 capsule Rfl: 0   HYDROcodone-acetaminophen (NORCO)  MG Oral Tab Take 1-2 tablets by mouth every 4 (four) hours as needed.  Disp: 80 tablet Rfl: 0   apixaban 5 MG Oral Tab Take 0.5 tablets (2. LOCALIZATION W/ SPECIMEN 1 SITE LEFT      Comment: neg  1986: HOWARD NEEDLE LOCALIZATION W/ SPECIMEN 1 SITE RIG*      Comment: neg  2/2006: OTHER SURGICAL HISTORY      Comment: excision of supraclavicle lymphoma  1976: TUBAL LIGATION   Family History   Proble leg.  Minimal redness on anterior shin. Non tender. Able to bear weight on L leg.     EXTREMITIES: no cyanosis, clubbing or edema  NEURO: Oriented times three, cranial nerves are intact, motor and sensory are grossly intact    Component      Latest Ref Rn left knee replacement  3. Swelling of left lower extremity  4. Pain of left calf        Patient and/or caregiver are educated on plan of care and current disease burden. Medical Needs:  Problem list has been updated to include discharge diagnosis.   [facility]

## 2017-10-05 ENCOUNTER — TELEPHONE (OUTPATIENT)
Dept: FAMILY MEDICINE CLINIC | Facility: CLINIC | Age: 77
End: 2017-10-05

## 2017-10-05 NOTE — TELEPHONE ENCOUNTER
Has redness to inner thighs and anterior lower leg has US on 10/3 which was negative she c/o pain when touched had him elevate the leg and will ask Dr Sinan Dominguez what else he wants

## 2017-10-05 NOTE — TELEPHONE ENCOUNTER
US was negative for DVT. She is acitve, moving around. She is on Xarelto.   May need to call ortho and see if appt can be moved up

## 2017-10-05 NOTE — TELEPHONE ENCOUNTER
Called  and explained plan he will call Dr Nico Bryant office and see if she can be seen sooner than 10/12/17

## 2017-10-05 NOTE — TELEPHONE ENCOUNTER
[facility] calling about patient her leg is swollen and a bit warm and she was positive babinski test. They are not sure how Dr Sonal Lima would like them to proceed with patient

## 2017-10-09 ENCOUNTER — TELEPHONE (OUTPATIENT)
Dept: FAMILY MEDICINE CLINIC | Facility: CLINIC | Age: 77
End: 2017-10-09

## 2017-10-09 NOTE — TELEPHONE ENCOUNTER
MAGY. Delbert Miller from [facility] informing us that pt will be discharged from Agency on Wed Oct 11th and she will be starting Out Pt Therapy on Oct 16th

## 2017-10-20 ENCOUNTER — PRIOR ORIGINAL RECORDS (OUTPATIENT)
Dept: OTHER | Age: 77
End: 2017-10-20

## 2017-11-06 ENCOUNTER — TELEPHONE (OUTPATIENT)
Dept: FAMILY MEDICINE CLINIC | Facility: CLINIC | Age: 77
End: 2017-11-06

## 2017-11-06 NOTE — TELEPHONE ENCOUNTER
Received fax for Celebrex form must be completed before patient receives medication. Paperwork in triage.

## 2017-11-07 NOTE — TELEPHONE ENCOUNTER
Initiated PA for Celebrex 200mg by accessing [employer] FEP form on \"covermymeds\". Entered pertinent info, pt diagnosis (m17.12) and length of treatment (4/28/11). Sent to plan.     Also responded to fax from [facility], filled out and returned to [facility]

## 2017-11-08 PROBLEM — M16.11 PRIMARY OSTEOARTHRITIS OF RIGHT HIP: Status: ACTIVE | Noted: 2017-11-08

## 2017-11-27 ENCOUNTER — TELEPHONE (OUTPATIENT)
Dept: FAMILY MEDICINE CLINIC | Facility: CLINIC | Age: 77
End: 2017-11-27

## 2017-11-27 NOTE — TELEPHONE ENCOUNTER
Pt calling stating that she is still waiting for her [employer] company telling her that we only partially sent to the ins company and we need to complete the request and send to them. I told patient that I would have Glory investigate this situation.

## 2017-11-28 NOTE — TELEPHONE ENCOUNTER
Reviewed PA I had initiated on 11/6/17. Still no response from   [facility] FEP. Submitted another PA on \"covermymeds\", however,it was refused as the previous PA was still in process. Called [employer] FEP and spoke with rep.  Reviewed PA status and completed verbal PA

## 2017-12-05 ENCOUNTER — IMMUNIZATION (OUTPATIENT)
Dept: FAMILY MEDICINE CLINIC | Facility: CLINIC | Age: 77
End: 2017-12-05

## 2017-12-05 PROCEDURE — G0008 ADMIN INFLUENZA VIRUS VAC: HCPCS | Performed by: FAMILY MEDICINE

## 2017-12-05 PROCEDURE — 90653 IIV ADJUVANT VACCINE IM: CPT | Performed by: FAMILY MEDICINE

## 2018-04-20 ENCOUNTER — PRIOR ORIGINAL RECORDS (OUTPATIENT)
Dept: OTHER | Age: 78
End: 2018-04-20

## 2018-05-03 ENCOUNTER — HOSPITAL ENCOUNTER (OUTPATIENT)
Dept: NUCLEAR MEDICINE | Facility: HOSPITAL | Age: 78
Discharge: HOME | End: 2018-05-03
Attending: INTERNAL MEDICINE
Payer: MEDICARE

## 2018-05-03 DIAGNOSIS — C82.90 FOLLICULAR NON-HODGKIN'S LYMPHOMA (HCC): ICD-10-CM

## 2018-05-03 PROCEDURE — 82962 GLUCOSE BLOOD TEST: CPT

## 2018-05-03 PROCEDURE — 78815 PET IMAGE W/CT SKULL-THIGH: CPT | Performed by: INTERNAL MEDICINE

## 2018-06-19 ENCOUNTER — TELEPHONE (OUTPATIENT)
Dept: FAMILY MEDICINE CLINIC | Facility: CLINIC | Age: 78
End: 2018-06-19

## 2018-06-19 NOTE — TELEPHONE ENCOUNTER
Kana for pt to complete Annual Health Assessment form prior to appt with Dr. Dewayne Patricia on 06/25/18

## 2018-06-25 ENCOUNTER — OFFICE VISIT (OUTPATIENT)
Dept: FAMILY MEDICINE CLINIC | Facility: CLINIC | Age: 78
End: 2018-06-25

## 2018-06-25 VITALS
HEIGHT: 63.25 IN | WEIGHT: 205 LBS | TEMPERATURE: 98 F | RESPIRATION RATE: 14 BRPM | DIASTOLIC BLOOD PRESSURE: 70 MMHG | BODY MASS INDEX: 35.87 KG/M2 | HEART RATE: 80 BPM | SYSTOLIC BLOOD PRESSURE: 108 MMHG

## 2018-06-25 DIAGNOSIS — Z12.39 ENCOUNTER FOR SCREENING FOR MALIGNANT NEOPLASM OF BREAST: ICD-10-CM

## 2018-06-25 DIAGNOSIS — E66.01 SEVERE OBESITY (BMI 35.0-39.9): ICD-10-CM

## 2018-06-25 DIAGNOSIS — C83.32 DIFFUSE LARGE B-CELL LYMPHOMA OF INTRATHORACIC LYMPH NODES (HCC): ICD-10-CM

## 2018-06-25 DIAGNOSIS — D32.9 MENINGIOMA (HCC): ICD-10-CM

## 2018-06-25 DIAGNOSIS — Z80.3 FAMILY HISTORY OF BREAST CANCER IN MOTHER: ICD-10-CM

## 2018-06-25 DIAGNOSIS — Z96.652 STATUS POST TOTAL LEFT KNEE REPLACEMENT: ICD-10-CM

## 2018-06-25 DIAGNOSIS — Z00.00 ENCOUNTER FOR ANNUAL HEALTH EXAMINATION: Primary | ICD-10-CM

## 2018-06-25 DIAGNOSIS — M85.80 OSTEOPENIA, UNSPECIFIED LOCATION: ICD-10-CM

## 2018-06-25 DIAGNOSIS — I70.0 ABDOMINAL AORTIC ATHEROSCLEROSIS (HCC): ICD-10-CM

## 2018-06-25 DIAGNOSIS — Z13.31 DEPRESSION SCREENING: ICD-10-CM

## 2018-06-25 PROCEDURE — G0439 PPPS, SUBSEQ VISIT: HCPCS | Performed by: FAMILY MEDICINE

## 2018-06-25 PROCEDURE — G0444 DEPRESSION SCREEN ANNUAL: HCPCS | Performed by: FAMILY MEDICINE

## 2018-06-25 RX ORDER — CELECOXIB 200 MG/1
200 CAPSULE ORAL DAILY
Qty: 90 CAPSULE | Refills: 3 | Status: SHIPPED | OUTPATIENT
Start: 2018-06-25 | End: 2019-06-25

## 2018-06-25 RX ORDER — CELECOXIB 200 MG/1
200 CAPSULE ORAL DAILY
COMMUNITY
End: 2018-06-25

## 2018-06-25 RX ORDER — RALOXIFENE HYDROCHLORIDE 60 MG/1
60 TABLET, FILM COATED ORAL
Qty: 90 TABLET | Refills: 3 | Status: SHIPPED | OUTPATIENT
Start: 2018-06-25 | End: 2019-06-25

## 2018-06-25 NOTE — PROGRESS NOTES
HPI:   Ross Banda is a 69year old female who presents for a Medicare Subsequent Annual Wellness visit (Pt already had Initial Annual Wellness). Preventative  Breast: 7/2017 - wnl. Colon: no indication. Immunizations: flu UTD.   PNA 13 and 23 (DERMATOLOGY)    Patient Active Problem List:     Pure hypercholesterolemia     Unspecified vitamin D deficiency     Diffuse large B-cell lymphoma of intrathoracic lymph nodes ([facility] [address]     Legal blindness, as defined in [place]     Fibrocystic breast disease ([address] Omega-3 Fatty Acids (FISH OIL) 1000 MG Oral Cap Take 1,000 mg by mouth daily. Calcium 500 MG Oral Chew Tab Chew 1 tablet by mouth daily. Cholecalciferol (VITAMIN D) 1000 UNITS Oral Tab Take 1 Tab by mouth daily.    Psyllium (METAMUCIL) 48.57 % Oral Assessment  (Required for AWV/SWV)    Hearing Screening    Time taken:  6/25/2018  9:59 AM  Entry User:  Pierre Merlos CMA  Screening Method:  Whisper Test  Whisper Test Result:  Fail (Comment: only passed right ear)            Visual Acuity  Right Eye 10/20/2014   • Pneumococcal (Prevnar 13) 07/18/2017   • Pneumovax 23 11/12/2013   • TD 01/01/1986, 01/01/1996   • Tetanus 06/17/2015        ASSESSMENT AND OTHER RELEVANT CHRONIC CONDITIONS:   Baylee Giordano is a 69year old female who presents for a Me past six months, have you lost more than 10 pounds without trying?: 2 - No  Has your appetite been poor?: No  How does the patient maintain a good energy level?: Other  How would you describe your daily physical activity?: Moderate  How would you describe at high risk There are no preventive care reminders to display for this patient. Update Health Maintenance if applicable    Chlamydia  Annually if high risk No results found for: CHLAMYDIA No flowsheet data found.     Screening Mammogram      Mammogram Rajni

## 2018-06-25 NOTE — PATIENT INSTRUCTIONS
Sylvia Espana's SCREENING SCHEDULE   Tests on this list are recommended by your physician but may not be covered, or covered at this frequency, by your insurer. Please check with your insurance carrier before scheduling to verify coverage.    PREVENT one of the following criteria:   • Men who are 73-68 years old and have smoked more than 100 cigarettes in their lifetime   • Anyone with a family history    Colorectal Cancer Screening  Covered up to Age 76     Colonoscopy Screen   Covered every 10 years- care reminders to display for this patient.  Please get this Mammogram regularly   Immunizations      Influenza  Covered Annually   Orders placed or performed in visit on 12/06/16  -FLU VACC [address] ANTIG   Orders placed or performed in visit on 10/20 their home computer and printer. (the forms are also available in [address])  [email or web address]  This link also has information from the [facility] regarding Advance Directives.

## 2018-06-28 ENCOUNTER — TELEPHONE (OUTPATIENT)
Dept: FAMILY MEDICINE CLINIC | Facility: CLINIC | Age: 78
End: 2018-06-28

## 2018-06-28 DIAGNOSIS — E55.9 VITAMIN D DEFICIENCY: ICD-10-CM

## 2018-06-28 DIAGNOSIS — D64.9 ANEMIA, UNSPECIFIED TYPE: ICD-10-CM

## 2018-06-28 DIAGNOSIS — E78.00 PURE HYPERCHOLESTEROLEMIA: Primary | ICD-10-CM

## 2018-06-28 NOTE — TELEPHONE ENCOUNTER
Pt notified to have labs done at [address], prior to appt, fasting labs 10-12 hours, water only. Pt verbalized understanding.

## 2018-06-28 NOTE — TELEPHONE ENCOUNTER
Patient needs lab orders entered in at [address]  Was advised by you to get labs drawn when she gets her mammogram.

## 2018-07-05 ENCOUNTER — HOSPITAL ENCOUNTER (OUTPATIENT)
Dept: MAMMOGRAPHY | Facility: HOSPITAL | Age: 78
Discharge: HOME | End: 2018-07-05
Attending: FAMILY MEDICINE
Payer: MEDICARE

## 2018-07-05 ENCOUNTER — LAB ENCOUNTER (OUTPATIENT)
Dept: LAB | Facility: HOSPITAL | Age: 78
End: 2018-07-05
Attending: FAMILY MEDICINE
Payer: MEDICARE

## 2018-07-05 DIAGNOSIS — D64.9 ANEMIA, UNSPECIFIED TYPE: ICD-10-CM

## 2018-07-05 DIAGNOSIS — E78.00 PURE HYPERCHOLESTEROLEMIA: ICD-10-CM

## 2018-07-05 DIAGNOSIS — Z12.39 ENCOUNTER FOR SCREENING FOR MALIGNANT NEOPLASM OF BREAST: ICD-10-CM

## 2018-07-05 LAB
ALBUMIN SERPL-MCNC: 3.8 G/DL (ref 3.5–4.8)
ALP LIVER SERPL-CCNC: 68 U/L (ref 55–142)
ALT SERPL-CCNC: 50 U/L (ref 14–54)
AST SERPL-CCNC: 44 U/L (ref 15–41)
BASOPHILS # BLD AUTO: 0.05 X10(3) UL (ref 0–0.1)
BASOPHILS NFR BLD AUTO: 1 %
BILIRUB SERPL-MCNC: 0.5 MG/DL (ref 0.1–2)
BUN BLD-MCNC: 15 MG/DL (ref 8–20)
CALCIUM BLD-MCNC: 9.4 MG/DL (ref 8.3–10.3)
CHLORIDE: 105 MMOL/L (ref 101–111)
CHOLEST SMN-MCNC: 223 MG/DL (ref ?–200)
CO2: 27 MMOL/L (ref 22–32)
CREAT BLD-MCNC: 0.78 MG/DL (ref 0.55–1.02)
EOSINOPHIL # BLD AUTO: 0.35 X10(3) UL (ref 0–0.3)
EOSINOPHIL NFR BLD AUTO: 6.9 %
ERYTHROCYTE [DISTWIDTH] IN BLOOD BY AUTOMATED COUNT: 12.9 % (ref 11.5–16)
GLUCOSE BLD-MCNC: 94 MG/DL (ref 70–99)
HCT VFR BLD AUTO: 44.6 % (ref 34–50)
HDLC SERPL-MCNC: 45 MG/DL (ref 45–?)
HDLC SERPL: 4.96 {RATIO} (ref ?–4.44)
HGB BLD-MCNC: 14.4 G/DL (ref 12–16)
IMMATURE GRANULOCYTE COUNT: 0.01 X10(3) UL (ref 0–1)
IMMATURE GRANULOCYTE RATIO %: 0.2 %
LDLC SERPL CALC-MCNC: 137 MG/DL (ref ?–130)
LYMPHOCYTES # BLD AUTO: 1.21 X10(3) UL (ref 0.9–4)
LYMPHOCYTES NFR BLD AUTO: 23.9 %
M PROTEIN MFR SERPL ELPH: 7.7 G/DL (ref 6.1–8.3)
MCH RBC QN AUTO: 29.8 PG (ref 27–33.2)
MCHC RBC AUTO-ENTMCNC: 32.3 G/DL (ref 31–37)
MCV RBC AUTO: 92.3 FL (ref 81–100)
MONOCYTES # BLD AUTO: 0.32 X10(3) UL (ref 0.1–1)
MONOCYTES NFR BLD AUTO: 6.3 %
NEUTROPHIL ABS PRELIM: 3.13 X10 (3) UL (ref 1.3–6.7)
NEUTROPHILS # BLD AUTO: 3.13 X10(3) UL (ref 1.3–6.7)
NEUTROPHILS NFR BLD AUTO: 61.7 %
NONHDLC SERPL-MCNC: 178 MG/DL (ref ?–130)
PLATELET # BLD AUTO: 161 10(3)UL (ref 150–450)
POTASSIUM SERPL-SCNC: 4.7 MMOL/L (ref 3.6–5.1)
RBC # BLD AUTO: 4.83 X10(6)UL (ref 3.8–5.1)
RED CELL DISTRIBUTION WIDTH-SD: 44.2 FL (ref 35.1–46.3)
SODIUM SERPL-SCNC: 138 MMOL/L (ref 136–144)
TRIGL SERPL-MCNC: 203 MG/DL (ref ?–150)
VLDLC SERPL CALC-MCNC: 41 MG/DL (ref 5–40)
WBC # BLD AUTO: 5.1 X10(3) UL (ref 4–13)

## 2018-07-05 PROCEDURE — 77067 SCR MAMMO BI INCL CAD: CPT | Performed by: FAMILY MEDICINE

## 2018-07-05 PROCEDURE — 85025 COMPLETE CBC W/AUTO DIFF WBC: CPT

## 2018-07-05 PROCEDURE — 80061 LIPID PANEL: CPT

## 2018-07-05 PROCEDURE — 36415 COLL VENOUS BLD VENIPUNCTURE: CPT

## 2018-07-05 PROCEDURE — 77063 BREAST TOMOSYNTHESIS BI: CPT | Performed by: FAMILY MEDICINE

## 2018-07-05 PROCEDURE — 80053 COMPREHEN METABOLIC PANEL: CPT

## 2018-10-09 ENCOUNTER — OFFICE VISIT (OUTPATIENT)
Dept: PODIATRY CLINIC | Facility: CLINIC | Age: 78
End: 2018-10-09
Payer: MEDICARE

## 2018-10-09 DIAGNOSIS — B35.1 ONYCHOMYCOSIS: Primary | ICD-10-CM

## 2018-10-09 PROCEDURE — 99213 OFFICE O/P EST LOW 20 MIN: CPT | Performed by: PODIATRIST

## 2018-10-09 NOTE — PROGRESS NOTES
Jack Davidson is a 69year old female. Patient presents with:  Toenail Care: Pt is here for nail care and fungal nail infection. Pt using Jublia on the nails.  Denies any pain  Toenail Fungus        HPI:   This pleasant patient presents to the clinic h Kesha Buck MD;  Location: [facility] ENDOSCOPY   • COLONOSCOPY N/A 10/23/2014    Performed by Kesha Buck MD at [facility]   • DIL Breann Vickers  2004   • EYE SURGERY  12/2002    L- optic nerve sx d/t tumor   • KNEE TOTAL [address] [facility] Hazards: Not Asked        Sleep Concern: Not Asked        Stress Concern: Not Asked        Weight Concern: Not Asked        Special Diet: Not Asked        Back Care: Not Asked        Exercise: No        Bike Helmet: Not Asked        Seat Belt: Yes        S

## 2018-10-17 ENCOUNTER — NURSE ONLY (OUTPATIENT)
Dept: FAMILY MEDICINE CLINIC | Facility: CLINIC | Age: 78
End: 2018-10-17
Payer: MEDICARE

## 2018-10-17 VITALS — TEMPERATURE: 99 F

## 2018-10-17 DIAGNOSIS — Z23 NEED FOR INFLUENZA VACCINATION: Primary | ICD-10-CM

## 2018-10-17 PROCEDURE — G0008 ADMIN INFLUENZA VIRUS VAC: HCPCS | Performed by: FAMILY MEDICINE

## 2018-10-17 PROCEDURE — 90653 IIV ADJUVANT VACCINE IM: CPT | Performed by: FAMILY MEDICINE

## 2018-10-19 ENCOUNTER — PRIOR ORIGINAL RECORDS (OUTPATIENT)
Dept: OTHER | Age: 78
End: 2018-10-19

## 2018-11-07 ENCOUNTER — HOSPITAL ENCOUNTER (OUTPATIENT)
Dept: CV DIAGNOSTICS | Facility: HOSPITAL | Age: 78
Discharge: HOME | End: 2018-11-07
Attending: INTERNAL MEDICINE
Payer: MEDICARE

## 2018-11-07 DIAGNOSIS — Z00.6: ICD-10-CM

## 2018-11-07 DIAGNOSIS — C82.91 FOLLICULAR LYMPHOMA, UNSPECIFIED, LYMPH NODES OF HEAD, FACE, AND NECK (HCC): ICD-10-CM

## 2018-11-07 DIAGNOSIS — C82.90: ICD-10-CM

## 2018-11-07 DIAGNOSIS — Z08 ENCOUNTER FOR FOLLOW-UP EXAMINATION AFTER COMPLETED TREATMENT FOR MALIGNANT NEOPLASM: ICD-10-CM

## 2018-11-07 PROCEDURE — 93306 TTE W/DOPPLER COMPLETE: CPT | Performed by: INTERNAL MEDICINE

## 2018-12-17 ENCOUNTER — OFFICE VISIT (OUTPATIENT)
Dept: FAMILY MEDICINE CLINIC | Facility: CLINIC | Age: 78
End: 2018-12-17
Payer: MEDICARE

## 2018-12-17 VITALS
WEIGHT: 203 LBS | DIASTOLIC BLOOD PRESSURE: 70 MMHG | TEMPERATURE: 99 F | HEART RATE: 78 BPM | SYSTOLIC BLOOD PRESSURE: 110 MMHG | BODY MASS INDEX: 36 KG/M2

## 2018-12-17 DIAGNOSIS — R09.81 NASAL CONGESTION: ICD-10-CM

## 2018-12-17 DIAGNOSIS — I51.89 LEFT VENTRICULAR DIASTOLIC DYSFUNCTION WITH PRESERVED SYSTOLIC FUNCTION: Primary | ICD-10-CM

## 2018-12-17 PROCEDURE — 99214 OFFICE O/P EST MOD 30 MIN: CPT | Performed by: FAMILY MEDICINE

## 2018-12-17 NOTE — PROGRESS NOTES
Patient presents with:  Test Results: review recent heart test results  Cold: x 4 days left sided sinus pressure drainage     HPI:   Skyler Diggs is a 69year old female who presents to the office for cardiac follow up.   Her oncologist ordered echo i diagnostic regional wall motion abnormality was identified,     this possibility cannot be completely excluded on the basis of this     study. Doppler parameters are consistent with abnormal left ventricular     relaxation and elevated LVEDP.   2. Left atri

## 2019-01-08 ENCOUNTER — OFFICE VISIT (OUTPATIENT)
Dept: PODIATRY CLINIC | Facility: CLINIC | Age: 79
End: 2019-01-08
Payer: MEDICARE

## 2019-01-08 DIAGNOSIS — B35.1 ONYCHOMYCOSIS: Primary | ICD-10-CM

## 2019-01-08 DIAGNOSIS — M20.41 HAMMER TOE OF RIGHT FOOT: ICD-10-CM

## 2019-01-08 DIAGNOSIS — M79.674 PAIN OF TOE OF RIGHT FOOT: ICD-10-CM

## 2019-01-08 DIAGNOSIS — L84 HELOMA DURUM: ICD-10-CM

## 2019-01-08 PROCEDURE — 99213 OFFICE O/P EST LOW 20 MIN: CPT | Performed by: PODIATRIST

## 2019-01-11 NOTE — PROGRESS NOTES
Alexander Santoyo is a 69year old female. Patient presents with:  Toenail Care: Pt here to get toenails trimmed. Denies any foot pain today. Toenail Fungus: Using Jublia topical for about 1.5 years.    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 Physical Exam  GENERAL: well developed, well nourished, in no apparent distress  EXTREMITIES:   1.  Integument: The nails 2-5 on the right 1-

## 2019-01-26 ENCOUNTER — TELEPHONE (OUTPATIENT)
Dept: FAMILY MEDICINE CLINIC | Facility: CLINIC | Age: 79
End: 2019-01-26

## 2019-01-26 NOTE — TELEPHONE ENCOUNTER
Received fax from [employer] for a PA for Celebrex. Paperwork in triage. Spoke with patient explaining the PA process she verbalized understanding.

## 2019-02-01 NOTE — TELEPHONE ENCOUNTER
Completed [employer] FEP form with pertinent info, pt diagnosis M17.12 and medication details. Faxed to plan  [phone] as requested.

## 2019-02-14 NOTE — TELEPHONE ENCOUNTER
Received approval for Celebrex 200mg from [facility] FEP. Valid from 1//05/19 until 2/04/20.   Order to [facility]

## 2019-03-19 ENCOUNTER — TELEPHONE (OUTPATIENT)
Dept: FAMILY MEDICINE CLINIC | Facility: CLINIC | Age: 79
End: 2019-03-19

## 2019-03-19 NOTE — TELEPHONE ENCOUNTER
Tried to call to schedule patient's Medicare Annual with Dr. Thomas Hamilton due in June.  The home phone rings and rings and then disconnects    Pt is on vacation in [place] and will call back when she returns in April to schedule her Medicare Annual with Dr. Minnie Muro

## 2019-04-19 ENCOUNTER — PRIOR ORIGINAL RECORDS (OUTPATIENT)
Dept: OTHER | Age: 79
End: 2019-04-19

## 2019-05-02 ENCOUNTER — OFFICE VISIT (OUTPATIENT)
Dept: PODIATRY CLINIC | Facility: CLINIC | Age: 79
End: 2019-05-02
Payer: MEDICARE

## 2019-05-02 DIAGNOSIS — M79.674 PAIN OF TOE OF RIGHT FOOT: ICD-10-CM

## 2019-05-02 DIAGNOSIS — L84 HELOMA DURUM: ICD-10-CM

## 2019-05-02 DIAGNOSIS — M20.41 HAMMER TOE OF RIGHT FOOT: ICD-10-CM

## 2019-05-02 DIAGNOSIS — B35.1 ONYCHOMYCOSIS: Primary | ICD-10-CM

## 2019-05-02 PROCEDURE — 99213 OFFICE O/P EST LOW 20 MIN: CPT | Performed by: PODIATRIST

## 2019-05-06 NOTE — PROGRESS NOTES
Gayathri Martin is a 69year old female. Patient presents with:  Toenail Care: Pt here to get toenails trimmed. Callus: left 4th toe -- Rates pain 2-3/10.    Toenail Fungus        HPI:   This patient presents complaining again of a callus on the fourth [facility] ENDOSCOPY   • D & C  1982   • DIL URETHRA,FEMALE,INITIAL  2004   • EYE SURGERY  12/2002    L- optic nerve sx d/t tumor   • KNEE TOTAL REPLACEMENT Left 9/28/2017    Performed by Imani Salazar MD at [facility] MAIN OR   • HOWARD BIOPSY STEREOTACTIC NODULE 2 SITE distress  EXTREMITIES:   1.  Integument: The nails 2-5 on the right 1-5 on the left are relatively within normal limits the second toe on the left does have a little thickening the right hallux nail is thickened but has a significantly improved appearance w

## 2019-05-17 ENCOUNTER — PRIOR ORIGINAL RECORDS (OUTPATIENT)
Dept: OTHER | Age: 79
End: 2019-05-17

## 2019-06-07 ENCOUNTER — HOSPITAL ENCOUNTER (OUTPATIENT)
Dept: ULTRASOUND IMAGING | Facility: HOSPITAL | Age: 79
Discharge: HOME | End: 2019-06-07
Attending: INTERNAL MEDICINE
Payer: MEDICARE

## 2019-06-07 DIAGNOSIS — R94.5 ABNORMAL FINDING ON LIVER FUNCTION: ICD-10-CM

## 2019-06-07 PROCEDURE — 76700 US EXAM ABDOM COMPLETE: CPT | Performed by: INTERNAL MEDICINE

## 2019-06-19 ENCOUNTER — TELEPHONE (OUTPATIENT)
Dept: FAMILY MEDICINE CLINIC | Facility: CLINIC | Age: 79
End: 2019-06-19

## 2019-06-19 NOTE — TELEPHONE ENCOUNTER
Called patient and left message on machine to contact the office to complete annual health assessment prior to appointment 06/25/19

## 2019-06-25 ENCOUNTER — OFFICE VISIT (OUTPATIENT)
Dept: FAMILY MEDICINE CLINIC | Facility: CLINIC | Age: 79
End: 2019-06-25
Payer: MEDICARE

## 2019-06-25 VITALS
BODY MASS INDEX: 36.86 KG/M2 | HEIGHT: 63.25 IN | SYSTOLIC BLOOD PRESSURE: 138 MMHG | DIASTOLIC BLOOD PRESSURE: 68 MMHG | HEART RATE: 88 BPM | RESPIRATION RATE: 24 BRPM | TEMPERATURE: 98 F | WEIGHT: 210.63 LBS

## 2019-06-25 DIAGNOSIS — G25.0 ESSENTIAL TREMOR: ICD-10-CM

## 2019-06-25 DIAGNOSIS — D32.9 MENINGIOMA (HCC): ICD-10-CM

## 2019-06-25 DIAGNOSIS — C83.32 DIFFUSE LARGE B-CELL LYMPHOMA OF INTRATHORACIC LYMPH NODES (HCC): ICD-10-CM

## 2019-06-25 DIAGNOSIS — Z80.3 FAMILY HISTORY OF BREAST CANCER IN MOTHER: ICD-10-CM

## 2019-06-25 DIAGNOSIS — Z00.00 ENCOUNTER FOR ANNUAL HEALTH EXAMINATION: Primary | ICD-10-CM

## 2019-06-25 DIAGNOSIS — Z78.0 POSTMENOPAUSAL: ICD-10-CM

## 2019-06-25 DIAGNOSIS — E78.00 PURE HYPERCHOLESTEROLEMIA: ICD-10-CM

## 2019-06-25 DIAGNOSIS — Z12.31 ENCOUNTER FOR SCREENING MAMMOGRAM FOR BREAST CANCER: ICD-10-CM

## 2019-06-25 DIAGNOSIS — Z13.31 DEPRESSION SCREENING: ICD-10-CM

## 2019-06-25 PROBLEM — S52.601A CLOSED FRACTURE OF DISTAL END OF RIGHT ULNA, UNSPECIFIED FRACTURE MORPHOLOGY, INITIAL ENCOUNTER: Status: RESOLVED | Noted: 2017-01-04 | Resolved: 2019-06-25

## 2019-06-25 PROBLEM — M16.11 PRIMARY OSTEOARTHRITIS OF RIGHT HIP: Status: RESOLVED | Noted: 2017-11-08 | Resolved: 2019-06-25

## 2019-06-25 PROBLEM — M17.12 OSTEOARTHRITIS OF LEFT KNEE: Status: RESOLVED | Noted: 2017-09-19 | Resolved: 2019-06-25

## 2019-06-25 PROCEDURE — G0439 PPPS, SUBSEQ VISIT: HCPCS | Performed by: FAMILY MEDICINE

## 2019-06-25 PROCEDURE — G0444 DEPRESSION SCREEN ANNUAL: HCPCS | Performed by: FAMILY MEDICINE

## 2019-06-25 RX ORDER — RUFINAMIDE 40 MG/ML
1 SUSPENSION ORAL DAILY
COMMUNITY

## 2019-06-25 RX ORDER — CELECOXIB 200 MG/1
200 CAPSULE ORAL DAILY
Qty: 90 CAPSULE | Refills: 3 | Status: SHIPPED | OUTPATIENT
Start: 2019-06-25 | End: 2020-02-21

## 2019-06-25 RX ORDER — RALOXIFENE HYDROCHLORIDE 60 MG/1
60 TABLET, FILM COATED ORAL
Qty: 90 TABLET | Refills: 3 | Status: SHIPPED | OUTPATIENT
Start: 2019-06-25 | End: 2020-07-13

## 2019-06-25 NOTE — TELEPHONE ENCOUNTER
Patient was seen this morning by Dr. Yannick Nieves and forgot to mention that she needed refills on celecoxib 200 MG Oral Cap and Raloxifene HCl (EVISTA) 60 MG Oral Tab sent to [facility] mail order.

## 2019-06-25 NOTE — PATIENT INSTRUCTIONS
Melania Espana's SCREENING SCHEDULE   Tests on this list are recommended by your physician but may not be covered, or covered at this frequency, by your insurer. Please check with your insurance carrier before scheduling to verify coverage.    PREVENT criteria:   • Men who are 73-68 years old and have smoked more than 100 cigarettes in their lifetime   • Anyone with a family history    Colorectal Cancer Screening  Covered up to Age 76     Colonoscopy Screen   Covered every 10 years- more often if abnorm display for this patient.  Please get this Mammogram regularly   Immunizations      Influenza  Covered Annually Orders placed or performed in visit on 12/06/16   • FLU VACC PRSV FREE INC ANTIG   Orders placed or performed in visit on 10/20/14   • FLU VACC P computer and printer. (the forms are also available in [address])  [email or web address]  This link also has information from the [facility] regarding Advance Directives.

## 2019-06-25 NOTE — PROGRESS NOTES
HPI:   Dakota Santoyo is a 69year old female who presents for a Medicare Subsequent Annual Wellness visit (Pt already had Initial Annual Wellness). Preventative  Breast: 7/5/2018.   Normal.   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       MEDICAL INFORMATION:   She  has a past medical history of Bronchitis, Cancer ([facility] [address] (2004), Exposure to radiation, Osteoarthrosis, unspecified whether generalized or localized, unspecified site, and Vis Visual Acuity: Corrected Left Eye Chart Acuity: (no sight in L eye)   Both Eyes Visual Acuity: Corrected Both Eyes Chart Acuity: 20/30          General Appearance:  Alert, cooperative, no distress, appears stated age   Head:  Normocephalic, without obvious ).    1. Encounter for annual health examination  Overall fair  Need to get weight down - its trending up  Watch diet, portion sizes  No more c-scope indication  Mammogram due in July. Ordered DEXA  Immun UTD.      2. Postmenopausal  Due for DEXA screening (mg/dL)   Date Value   06/05/2014 88          Cardiovascular Disease Screening     LDL Annually LDL Cholesterol (mg/dL)   Date Value   07/05/2018 137 (H)     LDL CHOLESTROL (mg/dL)   Date Value   06/05/2014 129        EKG - w/ Initial Preventative Physical Medium/high risk factors:   End-stage renal disease   Hemophiliacs who received Factor VIII or IX concentrates   Clients of institutions for the mentally retarded   Persons who live in the same house as a HepB virus carrier   Homosexual men   Illicit injec

## 2019-07-12 ENCOUNTER — HOSPITAL ENCOUNTER (OUTPATIENT)
Dept: MAMMOGRAPHY | Age: 79
Discharge: HOME | End: 2019-07-12
Attending: FAMILY MEDICINE
Payer: MEDICARE

## 2019-07-12 ENCOUNTER — HOSPITAL ENCOUNTER (OUTPATIENT)
Dept: BONE DENSITY | Age: 79
Discharge: HOME | End: 2019-07-12
Attending: FAMILY MEDICINE
Payer: MEDICARE

## 2019-07-12 DIAGNOSIS — Z12.31 ENCOUNTER FOR SCREENING MAMMOGRAM FOR BREAST CANCER: ICD-10-CM

## 2019-07-12 DIAGNOSIS — Z78.0 POSTMENOPAUSAL: ICD-10-CM

## 2019-07-12 PROCEDURE — 77080 DXA BONE DENSITY AXIAL: CPT | Performed by: FAMILY MEDICINE

## 2019-07-12 PROCEDURE — 77063 BREAST TOMOSYNTHESIS BI: CPT | Performed by: FAMILY MEDICINE

## 2019-07-12 PROCEDURE — 77067 SCR MAMMO BI INCL CAD: CPT | Performed by: FAMILY MEDICINE

## 2019-08-08 ENCOUNTER — OFFICE VISIT (OUTPATIENT)
Dept: PODIATRY CLINIC | Facility: CLINIC | Age: 79
End: 2019-08-08
Payer: MEDICARE

## 2019-08-08 DIAGNOSIS — M79.674 PAIN OF TOE OF RIGHT FOOT: ICD-10-CM

## 2019-08-08 DIAGNOSIS — B35.1 ONYCHOMYCOSIS: Primary | ICD-10-CM

## 2019-08-08 DIAGNOSIS — M20.41 HAMMER TOE OF RIGHT FOOT: ICD-10-CM

## 2019-08-08 DIAGNOSIS — L84 HELOMA DURUM: ICD-10-CM

## 2019-08-08 PROCEDURE — 99213 OFFICE O/P EST LOW 20 MIN: CPT | Performed by: PODIATRIST

## 2019-08-11 NOTE — PROGRESS NOTES
Brenda Miranda is a 67year old female. Patient presents with:  Toenail Care: 5/2/19 LOV, pt in office for toenail care. long toenails cause discomfort relieved by toenail clipping.    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 The patient indicates understanding of these issues and agrees to the plan. Return in about 2 months (around 10/8/2019).     Delfina Tilmlan, AUSTEN  8/11/2019

## 2019-09-25 ENCOUNTER — OFFICE VISIT (OUTPATIENT)
Dept: NEUROLOGY | Facility: CLINIC | Age: 79
End: 2019-09-25
Payer: MEDICARE

## 2019-09-25 VITALS
RESPIRATION RATE: 18 BRPM | BODY MASS INDEX: 37 KG/M2 | HEART RATE: 72 BPM | WEIGHT: 210 LBS | DIASTOLIC BLOOD PRESSURE: 74 MMHG | SYSTOLIC BLOOD PRESSURE: 130 MMHG

## 2019-09-25 DIAGNOSIS — G25.0 BENIGN ESSENTIAL TREMOR: Primary | ICD-10-CM

## 2019-09-25 PROCEDURE — 99204 OFFICE O/P NEW MOD 45 MIN: CPT | Performed by: OTHER

## 2019-09-25 RX ORDER — PROPRANOLOL HYDROCHLORIDE 20 MG/1
20 TABLET ORAL 3 TIMES DAILY
Qty: 60 TABLET | Refills: 2 | Status: SHIPPED | OUTPATIENT
Start: 2019-09-25 | End: 2019-11-06

## 2019-09-25 NOTE — PROGRESS NOTES
HPI:    Patient ID: Josefina Thibodeaux is a 67year old female. HPI  Ms Benigno Vazquez is a 67year old right handed female who presents for evaluation of tremors.  She states she had tremors for about 3 years or more and involves both hands right>left a (diverticulosis) Brother    • Heart Disease Brother    • Other (alzheimer's) Maternal Grandfather       Social History    Tobacco Use      Smoking status: Never Smoker      Smokeless tobacco: Never Used    Alcohol use: No      Alcohol/week: 0.0 standard dr NAUSEA AND VOMITING, DIZZINESS   PHYSICAL EXAM:   Physical Exam  Blood pressure 130/74, pulse 72, resp. rate 18, weight 210 lb, not currently breastfeeding. Vitals reviewed  General: well developed, well nourished  HEENT: Normocephalic and atraumatic. months    See orders and medications filed with this encounter. The patient indicates understanding of these issues and agrees with the plan. No orders of the defined types were placed in this encounter.       Meds This Visit:  Requested Prescription

## 2019-09-25 NOTE — PROGRESS NOTES
The patient states her tremors are located in her both of her hands and head. This started about  3 years ago which has gotten worse. Denies any headaches, speech changes or memory changes.  Patient has balance issues, however the patient is unable to see o

## 2019-10-17 ENCOUNTER — OFFICE VISIT (OUTPATIENT)
Dept: PODIATRY CLINIC | Facility: CLINIC | Age: 79
End: 2019-10-17
Payer: MEDICARE

## 2019-10-17 DIAGNOSIS — M20.41 HAMMER TOE OF RIGHT FOOT: ICD-10-CM

## 2019-10-17 DIAGNOSIS — M79.674 PAIN OF TOE OF RIGHT FOOT: ICD-10-CM

## 2019-10-17 DIAGNOSIS — L84 HELOMA DURUM: ICD-10-CM

## 2019-10-17 DIAGNOSIS — B35.1 ONYCHOMYCOSIS: Primary | ICD-10-CM

## 2019-10-17 PROCEDURE — 11055 PARING/CUTG B9 HYPRKER LES 1: CPT | Performed by: PODIATRIST

## 2019-10-17 PROCEDURE — 11721 DEBRIDE NAIL 6 OR MORE: CPT | Performed by: PODIATRIST

## 2019-10-17 NOTE — PROGRESS NOTES
Brenda Miranda is a 67year old female. Patient presents with:  Toenail Care: pt in office for toenail care. toenails are painful as they get longer. Toenail Fungus: pt does not feel like her nail has grown too much since LOV.  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 Return in about 3 months (around 1/17/2020).     Basim Stoddard DPM  8/11/2019

## 2019-10-22 ENCOUNTER — OFFICE VISIT (OUTPATIENT)
Dept: NEUROLOGY | Facility: CLINIC | Age: 79
End: 2019-10-22
Payer: MEDICARE

## 2019-10-22 VITALS
BODY MASS INDEX: 37 KG/M2 | WEIGHT: 213 LBS | DIASTOLIC BLOOD PRESSURE: 70 MMHG | RESPIRATION RATE: 16 BRPM | HEART RATE: 68 BPM | SYSTOLIC BLOOD PRESSURE: 124 MMHG

## 2019-10-22 DIAGNOSIS — G25.0 BENIGN ESSENTIAL TREMOR: Primary | ICD-10-CM

## 2019-10-22 PROCEDURE — 99213 OFFICE O/P EST LOW 20 MIN: CPT | Performed by: OTHER

## 2019-10-22 NOTE — PROGRESS NOTES
The patient states her writing has improved. Patient is having difficulty staying asleep. The patient is having nightmares. Patient has noticed an increase in strength. No changes in tremors of her mouth, decrease in tremors of her hands.

## 2019-10-23 NOTE — PROGRESS NOTES
HPI:    Patient ID: Aster Castillo is a 67year old female. HPI    Patient presents for follow up for tremors.  States tremors has improved however have noticed some side effects- flat affect and mild fatigue  She also c/o nightmares and wondering if Cancer Mother 59        B/L, age 59 then 77   • Heart Attack Father    • Other (Other) Father    • Stroke Maternal Grandmother    • Heart Attack Paternal Grandmother    • Ovarian Cancer Maternal Cousin Female 61   • Other (diverticulosis) Brother    • Hear Packet by mouth daily. , Disp: , Rfl:       Allergies:  Cleocin [Clindamyci*    DIARRHEA  Adhesive Tape           RASH  Ampicillin              RASH  Neosporin [Neomycin*    RASH  Oxycontin [Oxycodon*    NAUSEA AND VOMITING, DIZZINESS   PHYSICAL EXAM:   Phy propranolol. We will reduce the evening dose to 10 mg and see if that improves the side effect and if not we will discontinue the PM dose of propranolol. Counseled on sleep hygiene  Patient agrees with the plan.        RTC in about 3 months    See orders an

## 2019-10-24 ENCOUNTER — TELEPHONE (OUTPATIENT)
Dept: NEUROLOGY | Facility: CLINIC | Age: 79
End: 2019-10-24

## 2019-10-24 NOTE — TELEPHONE ENCOUNTER
Per Dr. Herman Aviles: Please let the patient know that I looked up and was able to find some reports of nightmares with Propranolol. We have reduce the evening dose to 10 mg and if still having nightmares discontinue the evening dose completely.      Patient no

## 2019-10-25 ENCOUNTER — PRIOR ORIGINAL RECORDS (OUTPATIENT)
Dept: OTHER | Age: 79
End: 2019-10-25

## 2019-11-06 ENCOUNTER — OFFICE VISIT (OUTPATIENT)
Dept: FAMILY MEDICINE CLINIC | Facility: CLINIC | Age: 79
End: 2019-11-06
Payer: MEDICARE

## 2019-11-06 VITALS
DIASTOLIC BLOOD PRESSURE: 70 MMHG | WEIGHT: 212 LBS | SYSTOLIC BLOOD PRESSURE: 138 MMHG | HEART RATE: 64 BPM | RESPIRATION RATE: 16 BRPM | TEMPERATURE: 97 F | HEIGHT: 63.25 IN | BODY MASS INDEX: 37.1 KG/M2

## 2019-11-06 DIAGNOSIS — M54.32 SCIATICA, LEFT SIDE: ICD-10-CM

## 2019-11-06 DIAGNOSIS — R19.7 DIARRHEA, UNSPECIFIED TYPE: ICD-10-CM

## 2019-11-06 DIAGNOSIS — R04.0 EPISTAXIS, RECURRENT: Primary | ICD-10-CM

## 2019-11-06 DIAGNOSIS — R10.9 ABDOMINAL CRAMPING: ICD-10-CM

## 2019-11-06 DIAGNOSIS — G25.0 BENIGN ESSENTIAL TREMOR: Primary | ICD-10-CM

## 2019-11-06 PROCEDURE — 99214 OFFICE O/P EST MOD 30 MIN: CPT | Performed by: FAMILY MEDICINE

## 2019-11-06 PROCEDURE — G0008 ADMIN INFLUENZA VIRUS VAC: HCPCS | Performed by: FAMILY MEDICINE

## 2019-11-06 PROCEDURE — 90662 IIV NO PRSV INCREASED AG IM: CPT | Performed by: FAMILY MEDICINE

## 2019-11-06 NOTE — PROGRESS NOTES
Patient presents with:  Change of Bowel Habits: Pt has been experiencing loose stool  Imm/Inj: Flu shot  Back Pain: Pain from left side low back radiating down to ankle  Epistaxis: Pt experiencing nose bleeds for 3 weeks     HPI:   Mohsen Fair is a supple, no significant adenopathy  Chest - clear to auscultation, no wheezes, rales or rhonchi, symmetric air entry  Heart - normal rate, regular rhythm, normal S1, S2, no murmurs, rubs, clicks or gallops  Abdomen - soft, nontender, nondistended, no masses

## 2019-11-06 NOTE — TELEPHONE ENCOUNTER
Per pharmacy pt requesting 90 day supply propranolol. Per TE dated 10/24/2019 pt to decrease evening dose Propranolol to 10mg nightly. Spoke with pharmacist, it was recommended pt be prescribed two 10mg tabs bid and one 10mg tab nightly.     Vadim glass

## 2019-11-07 RX ORDER — PROPRANOLOL HYDROCHLORIDE 10 MG/1
TABLET ORAL
Qty: 450 TABLET | Refills: 1 | Status: SHIPPED | OUTPATIENT
Start: 2019-11-07 | End: 2020-02-05

## 2019-12-31 ENCOUNTER — PRIOR ORIGINAL RECORDS (OUTPATIENT)
Dept: OTHER | Age: 79
End: 2019-12-31

## 2020-01-02 ENCOUNTER — OFFICE VISIT (OUTPATIENT)
Dept: FAMILY MEDICINE CLINIC | Facility: CLINIC | Age: 80
End: 2020-01-02
Payer: MEDICARE

## 2020-01-02 VITALS
HEIGHT: 63.39 IN | HEART RATE: 74 BPM | WEIGHT: 213 LBS | TEMPERATURE: 98 F | DIASTOLIC BLOOD PRESSURE: 84 MMHG | BODY MASS INDEX: 37.27 KG/M2 | RESPIRATION RATE: 16 BRPM | SYSTOLIC BLOOD PRESSURE: 128 MMHG

## 2020-01-02 DIAGNOSIS — M79.18 PAIN IN LEFT BUTTOCK: Primary | ICD-10-CM

## 2020-01-02 DIAGNOSIS — M54.32 SCIATIC NERVE PAIN, LEFT: ICD-10-CM

## 2020-01-02 PROCEDURE — 99213 OFFICE O/P EST LOW 20 MIN: CPT | Performed by: NURSE PRACTITIONER

## 2020-01-02 RX ORDER — METHYLPREDNISOLONE 4 MG/1
TABLET ORAL
Qty: 1 KIT | Refills: 0 | Status: SHIPPED | OUTPATIENT
Start: 2020-01-02 | End: 2020-01-24 | Stop reason: ALTCHOICE

## 2020-01-02 RX ORDER — CYCLOBENZAPRINE HCL 5 MG
5 TABLET ORAL NIGHTLY PRN
Qty: 10 TABLET | Refills: 0 | Status: SHIPPED | OUTPATIENT
Start: 2020-01-02 | End: 2020-01-28

## 2020-01-02 NOTE — PATIENT INSTRUCTIONS
Medrol dose pack prescribed today. Instructions for taking are located on packaging. For today, the first day, Take all 6 tablets at one time.    Starting tomorrow take tablets as indicated in packaging material.   Do not take Advi

## 2020-01-02 NOTE — PROGRESS NOTES
Patient presents with:  Pain: was here on 11/6 for the same pain in the left butt cheek       HPI:  Presents with approx 2 month history of left buttock pain that radiates down posterior leg to calf and ankle.  Stated pain started after raking leaves in Nov Oral Tablet Therapy Pack As directed. 1 kit 0   • cyclobenzaprine 5 MG Oral Tab Take 1 tablet (5 mg total) by mouth nightly as needed for Muscle spasms. 10 tablet 0   • Propranolol HCl 10 MG Oral Tab Take two tabs (20mg) two times daily.  Take 1 tab (10mg) about sedation with this medication and advised pt about necessary precautions and activities to avoid. Referred to physical therapy for eval and treat. Instructed to notify office if not improved with these measures or if symptoms worsen.  Verbalized under

## 2020-01-23 ENCOUNTER — TELEPHONE (OUTPATIENT)
Dept: FAMILY MEDICINE CLINIC | Facility: CLINIC | Age: 80
End: 2020-01-23

## 2020-01-23 DIAGNOSIS — M54.32 SCIATIC NERVE PAIN, LEFT: ICD-10-CM

## 2020-01-23 DIAGNOSIS — M79.18 PAIN IN LEFT BUTTOCK: Primary | ICD-10-CM

## 2020-01-23 NOTE — TELEPHONE ENCOUNTER
Patient called requesting a possible xray for her sciatic pain, states physical therapy and muscle relaxers don't seem to help

## 2020-01-24 ENCOUNTER — OFFICE VISIT (OUTPATIENT)
Dept: PODIATRY CLINIC | Facility: CLINIC | Age: 80
End: 2020-01-24
Payer: MEDICARE

## 2020-01-24 DIAGNOSIS — L84 HELOMA DURUM: ICD-10-CM

## 2020-01-24 DIAGNOSIS — M79.674 PAIN OF TOE OF RIGHT FOOT: ICD-10-CM

## 2020-01-24 DIAGNOSIS — B35.1 ONYCHOMYCOSIS: Primary | ICD-10-CM

## 2020-01-24 DIAGNOSIS — M20.41 HAMMER TOE OF RIGHT FOOT: ICD-10-CM

## 2020-01-24 DIAGNOSIS — H54.8 LEGAL BLINDNESS, AS DEFINED IN USA: ICD-10-CM

## 2020-01-24 PROCEDURE — 11721 DEBRIDE NAIL 6 OR MORE: CPT | Performed by: PODIATRIST

## 2020-01-24 NOTE — TELEPHONE ENCOUNTER
PT note indicates little improvement as well and imaging seems appropriate. Xrays ordered as well as referral to Dr. Soumya Valdivia will can eval and decide if more imaging such as MRI is appropriate. Since insurance requires xray prior to MRI I will order that.

## 2020-01-25 ENCOUNTER — HOSPITAL ENCOUNTER (OUTPATIENT)
Dept: GENERAL RADIOLOGY | Facility: HOSPITAL | Age: 80
Discharge: HOME | End: 2020-01-25
Attending: NURSE PRACTITIONER
Payer: MEDICARE

## 2020-01-25 DIAGNOSIS — M79.18 PAIN IN LEFT BUTTOCK: ICD-10-CM

## 2020-01-25 DIAGNOSIS — M54.32 SCIATIC NERVE PAIN, LEFT: ICD-10-CM

## 2020-01-25 PROCEDURE — 72110 X-RAY EXAM L-2 SPINE 4/>VWS: CPT | Performed by: NURSE PRACTITIONER

## 2020-01-27 NOTE — PROGRESS NOTES
Skyler Diggs is a 67year old female. Patient presents with:  Toenail Care: LOV 10/17/19.  pt in office for toenail care, where her toenails are long they are painful. 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

## 2020-01-27 NOTE — PROGRESS NOTES
Gab Magallon is a 67year old female. Patient presents with:  Toenail Care: LOV 10/17/19.  pt in office for toenail care, where her toenails are long they are painful. pain is relieved by toenail trimming  Toe Pain: between 4th and 5th toe is painful, • cyclobenzaprine 5 MG Oral Tab Take 1 tablet (5 mg total) by mouth nightly as needed for Muscle spasms.  (Patient not taking: Reported on 1/24/2020 ) 10 tablet 0      Past Medical History:   Diagnosis Date   • Bronchitis    • Cancer ([facility] [address] 2004    non-h Smokeless tobacco: Never Used    Substance and Sexual Activity      Alcohol use: No        Alcohol/week: 0.0 standard drinks      Drug use: No    Other Topics      Concerns:        Caffeine Concern: No          1 cup daily decaf        Exercise: No foot    Legal blindness, as defined in [address]: Today using a nail nippers were trimmed and debrided toenails 1-5 manually and mechanically in girth and width as far down to healthy tissue as possible on both feet.   This was done uneventfully there

## 2020-01-28 RX ORDER — CYCLOBENZAPRINE HCL 5 MG
5 TABLET ORAL NIGHTLY PRN
Qty: 10 TABLET | Refills: 0 | Status: SHIPPED | OUTPATIENT
Start: 2020-01-28 | End: 2020-05-01

## 2020-01-28 NOTE — TELEPHONE ENCOUNTER
Pt states that she has appt scheduled with Dr Gabriel Emery on 2/10/2020. She only has three Cyclobenzaprine tablets left. Will you authorize additional Cyclobenzaprine-pended?     Routed to [place] [id number]

## 2020-02-05 ENCOUNTER — OFFICE VISIT (OUTPATIENT)
Dept: NEUROLOGY | Facility: CLINIC | Age: 80
End: 2020-02-05
Payer: MEDICARE

## 2020-02-05 VITALS
HEART RATE: 70 BPM | RESPIRATION RATE: 18 BRPM | SYSTOLIC BLOOD PRESSURE: 128 MMHG | WEIGHT: 217 LBS | BODY MASS INDEX: 38 KG/M2 | DIASTOLIC BLOOD PRESSURE: 72 MMHG

## 2020-02-05 DIAGNOSIS — G25.0 BENIGN ESSENTIAL TREMOR: Primary | ICD-10-CM

## 2020-02-05 PROCEDURE — 99213 OFFICE O/P EST LOW 20 MIN: CPT | Performed by: OTHER

## 2020-02-05 RX ORDER — PRIMIDONE 50 MG/1
TABLET ORAL
Qty: 60 TABLET | Refills: 0 | Status: SHIPPED | OUTPATIENT
Start: 2020-02-05 | End: 2020-02-20

## 2020-02-05 NOTE — PROGRESS NOTES
The patient states she is sleeping better. Patient states slight better when she is taking her medications. Patient is having buttock pain that causes right sided lower extremity pain.

## 2020-02-05 NOTE — PROGRESS NOTES
HPI:    Patient ID: Sofie Hart is a 67year old female. HPI    Patient presents for follow up for tremors. She is on Propranolol and had side effects so we lowe the dose- insomnia and nightmares improved but still feels fatigue in the daytime.  O Problem Relation Age of Onset   • Breast Cancer Mother 59        B/L, age 59 then 76   • Heart Attack Father    • Other (Other) Father    • Stroke Maternal Grandmother    • Heart Attack Paternal Grandmother    • Ovarian Cancer Maternal Cousin Female 61 0.05 % External Cream Apply 1 g topically 2 (two) times daily. Apply sparingly and rub gently into the affected area(s).  (Patient not taking: Reported on 2/5/2020 ) 60 g 1     Allergies:  Cleocin [Clindamyci*    DIARRHEA  Adhesive Tape           RASH  Ampi We cannot go up on Propranolol due to side effects  Discussed options and advised switching to Primidone- start will the lowest possible dose and titrate slowly to 50 mg BID.  Side effect explained  If unable to tolerate may consider Amantadine    Left sc

## 2020-02-10 ENCOUNTER — OFFICE VISIT (OUTPATIENT)
Dept: SURGERY | Facility: CLINIC | Age: 80
End: 2020-02-10
Payer: MEDICARE

## 2020-02-10 VITALS
HEART RATE: 96 BPM | HEIGHT: 63 IN | DIASTOLIC BLOOD PRESSURE: 68 MMHG | BODY MASS INDEX: 38.27 KG/M2 | SYSTOLIC BLOOD PRESSURE: 134 MMHG | WEIGHT: 216 LBS

## 2020-02-10 DIAGNOSIS — M54.16 LUMBAR RADICULOPATHY, ACUTE: ICD-10-CM

## 2020-02-10 DIAGNOSIS — M48.062 SPINAL STENOSIS OF LUMBAR REGION WITH NEUROGENIC CLAUDICATION: Primary | ICD-10-CM

## 2020-02-10 DIAGNOSIS — C85.90 LYMPHOMA, UNSPECIFIED BODY REGION, UNSPECIFIED LYMPHOMA TYPE (HCC): ICD-10-CM

## 2020-02-10 PROCEDURE — 99214 OFFICE O/P EST MOD 30 MIN: CPT | Performed by: PHYSICIAN ASSISTANT

## 2020-02-10 RX ORDER — METHYLPREDNISOLONE 4 MG/1
TABLET ORAL
Qty: 1 PACKAGE | Refills: 0 | Status: SHIPPED | OUTPATIENT
Start: 2020-02-10 | End: 2020-02-24 | Stop reason: ALTCHOICE

## 2020-02-10 NOTE — PROGRESS NOTES
[facility] Neurosurgery Consultation      HISTORY OF PRESENT ILLNESS:Lisseth Luis is a 67year old female here for spinal consultation.     He was a history of having left buttock and leg pain about 15 to 20 years ago symptoms resolved after a brief period 2004   • COLONOSCOPY N/A 10/23/2014    Performed by Cary Caballero MD at [facility]   • KAMAR Menon  2004   • EYE SURGERY  12/2002    L- optic nerve sx d/t tumor   • KNEE TOTAL REPLACEMENT Left 9/28/2017    Performed by Rufino Michael forward flexion. She does get some left-sided back pain on extension. She has a negative straight leg raise bilaterally. No pain on internal/external rotation of the hips.     Upper extremity strength:       Deltoid    Triceps     Biceps        Wrist [phone]

## 2020-02-10 NOTE — PROGRESS NOTES
Location of Pain: Pt states pain in left buttock radiating down to the left calf and ankle. No numbness or tingling issues. No issue with weakness. No B/B issue.    Date Pain Began: November           Work Related:   No        Receiving Work Comp/Disability

## 2020-02-10 NOTE — PATIENT INSTRUCTIONS
Refill policies:    • Allow 2-3 business days for refills; controlled substances may take longer.   • Contact your pharmacy at least 5 days prior to running out of medication and have them send an electronic request or submit request through the “request re Depending on your insurance carrier, approval may take 3-10 days. It is highly recommended patients contact their insurance carrier directly to determine coverage.   If test is done without insurance authorization, patient may be responsible for the entire with foraminal stenosis left leg radiculopathy  3. History of lymphoma    PLAN:  1. Second Medrol Dosepak  2. Continue with home exercises  3. MRI of the lumbar spine with and without contrast with her history of lymphoma  4.   Follow-up in 1 to 2 weeks

## 2020-02-13 ENCOUNTER — TELEPHONE (OUTPATIENT)
Dept: FAMILY MEDICINE CLINIC | Facility: CLINIC | Age: 80
End: 2020-02-13

## 2020-02-19 ENCOUNTER — HOSPITAL ENCOUNTER (OUTPATIENT)
Dept: MRI IMAGING | Age: 80
Discharge: HOME | End: 2020-02-19
Attending: PHYSICIAN ASSISTANT
Payer: MEDICARE

## 2020-02-19 DIAGNOSIS — M54.16 LUMBAR RADICULOPATHY, ACUTE: ICD-10-CM

## 2020-02-19 DIAGNOSIS — C85.90 LYMPHOMA, UNSPECIFIED BODY REGION, UNSPECIFIED LYMPHOMA TYPE (HCC): ICD-10-CM

## 2020-02-19 DIAGNOSIS — M48.062 SPINAL STENOSIS OF LUMBAR REGION WITH NEUROGENIC CLAUDICATION: ICD-10-CM

## 2020-02-19 LAB — CREAT BLD-MCNC: 0.7 MG/DL (ref 0.55–1.02)

## 2020-02-19 PROCEDURE — 72158 MRI LUMBAR SPINE W/O & W/DYE: CPT | Performed by: PHYSICIAN ASSISTANT

## 2020-02-19 PROCEDURE — A9575 INJ GADOTERATE MEGLUMI 0.1ML: HCPCS | Performed by: PHYSICIAN ASSISTANT

## 2020-02-19 PROCEDURE — 82565 ASSAY OF CREATININE: CPT

## 2020-02-20 DIAGNOSIS — G25.0 BENIGN ESSENTIAL TREMOR: Primary | ICD-10-CM

## 2020-02-20 RX ORDER — PRIMIDONE 50 MG/1
TABLET ORAL
Qty: 180 TABLET | Refills: 0 | Status: SHIPPED | OUTPATIENT
Start: 2020-02-20 | End: 2020-02-24

## 2020-02-20 NOTE — TELEPHONE ENCOUNTER
Patient is calling Celebrex 200 mg needs to be refilled to [facility]. They are saying she needs a new script sent. Please send. Patient thought she had 2 refills left but they told her they will not refill without a new script sent.

## 2020-02-20 NOTE — TELEPHONE ENCOUNTER
Medication: Primidone 50 mg     Date of last refill: 2/5/2020 (#60/0)  [place]    Last office visit: 2/5/2020  Due back to clinic per last office note:  3 months  Date next office visit scheduled:    Future Appointmen

## 2020-02-21 RX ORDER — CELECOXIB 200 MG/1
200 CAPSULE ORAL DAILY
Qty: 90 CAPSULE | Refills: 3 | Status: SHIPPED | OUTPATIENT
Start: 2020-02-21 | End: 2020-02-24

## 2020-02-24 ENCOUNTER — TELEPHONE (OUTPATIENT)
Dept: NEUROLOGY | Facility: CLINIC | Age: 80
End: 2020-02-24

## 2020-02-24 ENCOUNTER — TELEPHONE (OUTPATIENT)
Dept: FAMILY MEDICINE CLINIC | Facility: CLINIC | Age: 80
End: 2020-02-24

## 2020-02-24 ENCOUNTER — OFFICE VISIT (OUTPATIENT)
Dept: SURGERY | Facility: CLINIC | Age: 80
End: 2020-02-24
Payer: MEDICARE

## 2020-02-24 VITALS — SYSTOLIC BLOOD PRESSURE: 124 MMHG | DIASTOLIC BLOOD PRESSURE: 60 MMHG | HEART RATE: 102 BPM

## 2020-02-24 DIAGNOSIS — C85.90 LYMPHOMA, UNSPECIFIED BODY REGION, UNSPECIFIED LYMPHOMA TYPE (HCC): ICD-10-CM

## 2020-02-24 DIAGNOSIS — G25.0 BENIGN ESSENTIAL TREMOR: ICD-10-CM

## 2020-02-24 DIAGNOSIS — M48.062 SPINAL STENOSIS OF LUMBAR REGION WITH NEUROGENIC CLAUDICATION: Primary | ICD-10-CM

## 2020-02-24 DIAGNOSIS — M54.16 LUMBAR RADICULOPATHY, ACUTE: ICD-10-CM

## 2020-02-24 PROCEDURE — 99214 OFFICE O/P EST MOD 30 MIN: CPT | Performed by: PHYSICIAN ASSISTANT

## 2020-02-24 RX ORDER — MELOXICAM 5 MG/1
5 CAPSULE ORAL DAILY
Qty: 30 CAPSULE | Refills: 1 | Status: SHIPPED | OUTPATIENT
Start: 2020-02-24 | End: 2020-03-25

## 2020-02-24 RX ORDER — METHYLPREDNISOLONE 4 MG/1
TABLET ORAL
Qty: 1 PACKAGE | Refills: 0 | Status: SHIPPED | OUTPATIENT
Start: 2020-02-24 | End: 2020-07-13 | Stop reason: ALTCHOICE

## 2020-02-24 RX ORDER — PRIMIDONE 50 MG/1
TABLET ORAL
Qty: 1 TABLET | Refills: 0 | COMMUNITY
Start: 2020-02-24 | End: 2020-05-04

## 2020-02-24 NOTE — PROGRESS NOTES
Pt is here for follow up. Imaging: MRI lumbar obtained         Pt states she is doing much better since LOV. No new symptoms. Pt states medrol was very helpful.

## 2020-02-24 NOTE — TELEPHONE ENCOUNTER
RN spoke with pt and expressed concern about medication dosage change. Upon epic review RX was entered incorrectly. Pt is to take 1 tab BID not 2 tabs BID. Rx changed historically.  Informed pt that her dose has not changed the rx was sent through wrong

## 2020-02-24 NOTE — TELEPHONE ENCOUNTER
Patient called states insurance is no longer covering the Celebrex, wants to know if can stop taking?

## 2020-02-24 NOTE — PATIENT INSTRUCTIONS
Refill policies:    • Allow 2-3 business days for refills; controlled substances may take longer.   • Contact your pharmacy at least 5 days prior to running out of medication and have them send an electronic request or submit request through the “request re Depending on your insurance carrier, approval may take 3-10 days. It is highly recommended patients contact their insurance carrier directly to determine coverage.   If test is done without insurance authorization, patient may be responsible for the entire exercises  3. He was switched from Celebrex to meloxicam 5 mg daily  4. Follow-up as needed  • 5.   She understands she will continue with primidone 50 mg twice daily per Dr. Antoine Vu instructions

## 2020-02-24 NOTE — PROGRESS NOTES
[facility]   There and follow-up      HISTORY OF PRESENT ILLNESS:Lisseth Collado is a 67year old female here in follow-up. She states overall she is about 90% better she did take a second Medrol Dosepak.   Her back pain is mild at a 1–2/10 she walking. Using a grocery cart does help. She has trouble rolling over at night or getting out of bed in the morning. She denies bowel or bladder incontinence. Denies any weight loss. She has gained some weight.   She does have a history of lymphoma w drugs.     ALLERGIES:    Cleocin [Clindamyci*    DIARRHEA  Adhesive Tape           RASH  Ampicillin              RASH  Neosporin [Neomycin*    RASH  Oxycontin [Oxycodon*    NAUSEA AND VOMITING, DIZZINESS    REVIEW OF SYSTEMS:  A 10-point system was reviewed has a flareup  2. Continue with home exercises  3. He was switched from Celebrex to meloxicam 5 mg daily  4. Follow-up as needed  5. She understands she will continue with primidone 50 mg twice daily per Dr. Raffi Guillen instructions        KIRK Breen Pi

## 2020-02-24 NOTE — TELEPHONE ENCOUNTER
Ok to stop celebrex  Celebrex is an anti-inflammatory. If pains were to increase, we might have to find an alternative.

## 2020-02-25 NOTE — TELEPHONE ENCOUNTER
Received denial for coverage of Celebrex 200mg from [facility] FEP. Patient aware.

## 2020-02-27 ENCOUNTER — TELEPHONE (OUTPATIENT)
Dept: NEUROLOGY | Facility: CLINIC | Age: 80
End: 2020-02-27

## 2020-02-27 NOTE — TELEPHONE ENCOUNTER
Celebrex was not on patient's formulary she had been on it for a long time. As a courtesy I switched her to meloxicam so she would not have to see her PCP. However the pharmacist says meloxicam is on her formulary.   I recommend she consult her primary

## 2020-02-27 NOTE — TELEPHONE ENCOUNTER
At the patient's request we discontinue Celebrex because her insurance was not covering as was her meloxicam.  If that is not covered recommend she follow-up with Dr. Rafi Richardson and get his advice.

## 2020-02-27 NOTE — TELEPHONE ENCOUNTER
Received notification that meloxicam is not covered by plan. Alternatives include: ibuprofen, naproxen, diclofenac, nabumetone. Will clarify if a suggested alternative is appropriate or if a PA should be attempted.

## 2020-02-27 NOTE — TELEPHONE ENCOUNTER
LMTCB to notify patient that meloxicam is not covered by insurance. Per internet query, it appears to be an inexpensive medication and patient may wish to pay cash and/or use a coupon for cost savings.  If she wishes her insurance to cover NSAID medicati

## 2020-02-27 NOTE — TELEPHONE ENCOUNTER
Called patient, left voicemail (ok per HIPPA). Prescriptions for Meloxicm will have to be obtained from PCP.   Office number left for patient to call back if needed

## 2020-02-27 NOTE — TELEPHONE ENCOUNTER
Pharmacy calling to advise meloxicam capsules not covered by ins; meloxicam tablets should be covered only come in 7.5mg.  Pt is waiting at the pharmacy

## 2020-02-28 ENCOUNTER — TELEPHONE (OUTPATIENT)
Dept: FAMILY MEDICINE CLINIC | Facility: CLINIC | Age: 80
End: 2020-02-28

## 2020-02-28 RX ORDER — CELECOXIB 200 MG/1
200 CAPSULE ORAL DAILY
Qty: 90 CAPSULE | Refills: 1 | Status: SHIPPED | OUTPATIENT
Start: 2020-02-28 | End: 2020-07-13

## 2020-02-28 NOTE — TELEPHONE ENCOUNTER
Patient called requesting to speak with nurse, states her insurance is no longer covering Celebrex, wants to know if there is an alternate Dr Sunita Maher would recommend that will help with arthritis?

## 2020-02-28 NOTE — TELEPHONE ENCOUNTER
Called and talked to patient the insurance also won't cover the meloxicam 5 mg capsules which is $1000 for 30 tablets the meloxicam 7.5 mg tablets is 19.95 at [facility] for 90 tablets celebrix was $31 for 90 capsules the patient would like to stay with this

## 2020-02-28 NOTE — TELEPHONE ENCOUNTER
This was ordered by PA from Dr. Christina Soliman office, GUSTAVO [facility]es [facility]. According to his note from 2/24/2020:   \"States the Celebrex which she is been taking for many years has been discontinued by her insurance. She would like to switch to a different NSAID. \"

## 2020-04-23 ENCOUNTER — HOSPITAL (OUTPATIENT)
Dept: OTHER | Age: 80
End: 2020-04-23

## 2020-04-24 ENCOUNTER — PRIOR ORIGINAL RECORDS (OUTPATIENT)
Dept: OTHER | Age: 80
End: 2020-04-24

## 2020-04-24 PROCEDURE — 99442 TELEPHONE E&M BY PHYSICIAN EST PT NOT ORIG PREV 7 DAYS 11-20 MIN: CPT | Performed by: INTERNAL MEDICINE

## 2020-04-30 ENCOUNTER — TELEPHONE (OUTPATIENT)
Dept: FAMILY MEDICINE CLINIC | Facility: CLINIC | Age: 80
End: 2020-04-30

## 2020-04-30 DIAGNOSIS — G25.0 BENIGN ESSENTIAL TREMOR: ICD-10-CM

## 2020-04-30 NOTE — TELEPHONE ENCOUNTER
Pt had f/u appt scheduled for 5/6/20. Have LMTCB to convert to video visit. Will f/u with pt in the morning to convert appt. Pt accepted phone visit for 5/4. Pt does not need medication prior to appt.   Will refuse this request.    Medication: PRIMIDO

## 2020-05-01 ENCOUNTER — HOSPITAL (OUTPATIENT)
Dept: OTHER | Age: 80
End: 2020-05-01

## 2020-05-01 RX ORDER — PRIMIDONE 50 MG/1
TABLET ORAL
Qty: 180 TABLET | Refills: 0 | OUTPATIENT
Start: 2020-05-01

## 2020-05-01 RX ORDER — CYCLOBENZAPRINE HCL 5 MG
TABLET ORAL
Qty: 10 TABLET | Refills: 0 | Status: SHIPPED | OUTPATIENT
Start: 2020-05-01 | End: 2020-07-31

## 2020-05-04 ENCOUNTER — VIRTUAL PHONE E/M (OUTPATIENT)
Dept: NEUROLOGY | Facility: CLINIC | Age: 80
End: 2020-05-04
Payer: MEDICARE

## 2020-05-04 ENCOUNTER — TELEPHONE (OUTPATIENT)
Dept: SURGERY | Facility: CLINIC | Age: 80
End: 2020-05-04

## 2020-05-04 VITALS
BODY MASS INDEX: 37 KG/M2 | SYSTOLIC BLOOD PRESSURE: 120 MMHG | DIASTOLIC BLOOD PRESSURE: 77 MMHG | WEIGHT: 210.88 LBS | TEMPERATURE: 98 F | HEART RATE: 84 BPM

## 2020-05-04 DIAGNOSIS — G25.0 BENIGN ESSENTIAL TREMOR: ICD-10-CM

## 2020-05-04 PROCEDURE — 99441 PHONE E/M BY PHYS 5-10 MIN: CPT | Performed by: OTHER

## 2020-05-04 RX ORDER — PRIMIDONE 50 MG/1
50 TABLET ORAL 3 TIMES DAILY
Qty: 180 TABLET | Refills: 1 | Status: SHIPPED | OUTPATIENT
Start: 2020-05-04 | End: 2020-08-24

## 2020-05-04 NOTE — PROGRESS NOTES
Virtual Telephone Check-In    Odin Barrera verbally consents a Virtual/Telephone Check-In visit on 05/04/20. Patient understands and accepts financial responsibility for any deductible, co-insurance and/or co-pays associated with this service.

## 2020-05-04 NOTE — TELEPHONE ENCOUNTER
Patient indicates that she is having pain on the other side of body left leg and above buttocks  so I started taking the Medrol dose sarbjit. Started Sunday morning. Questions is did I do the right think and where should I go from here?

## 2020-05-04 NOTE — TELEPHONE ENCOUNTER
Patient called. States having right-sided shooting leg pain. Feels like an electrical shock. It is going from the right upper buttock region down the right outer aspect of her calf. No pain in the feet or the toes. No numbness, tingling or weakness.

## 2020-05-04 NOTE — TELEPHONE ENCOUNTER
At [address] [place] [address] on 2/24/20 with OLIVIA Clay:    \"ASSESSMENT:  1. L4-5 L5-S1 spondylosis  2. 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 History of lymphoma     PLAN:  1.    Medrol Dosepak given to bring with her on [place] in [place]

## 2020-06-01 ENCOUNTER — HOSPITAL (OUTPATIENT)
Dept: OTHER | Age: 80
End: 2020-06-01

## 2020-07-01 ENCOUNTER — HOSPITAL (OUTPATIENT)
Dept: OTHER | Age: 80
End: 2020-07-01
Attending: INTERNAL MEDICINE

## 2020-07-02 ENCOUNTER — TELEPHONE (OUTPATIENT)
Dept: FAMILY MEDICINE CLINIC | Facility: CLINIC | Age: 80
End: 2020-07-02

## 2020-07-02 DIAGNOSIS — R92.2 BREAST DENSITY: ICD-10-CM

## 2020-07-02 DIAGNOSIS — Z12.39 SCREENING FOR MALIGNANT NEOPLASM OF BREAST: Primary | ICD-10-CM

## 2020-07-02 NOTE — TELEPHONE ENCOUNTER
Reviewed Last Mammogram on 6/25/2019  NOTED     ROUTINE MAMMOGRAM AND CLINICAL EVALUATION IN 12 MONTHS.

## 2020-07-02 NOTE — TELEPHONE ENCOUNTER
Patient is calling she received her mammogram due letter and would like an order entered.  She is scheduled for her physical with Dr. Chris Barr on 7/13/20 at 12 pm

## 2020-07-08 ENCOUNTER — TELEPHONE (OUTPATIENT)
Dept: FAMILY MEDICINE CLINIC | Facility: CLINIC | Age: 80
End: 2020-07-08

## 2020-07-13 ENCOUNTER — OFFICE VISIT (OUTPATIENT)
Dept: FAMILY MEDICINE CLINIC | Facility: CLINIC | Age: 80
End: 2020-07-13
Payer: MEDICARE

## 2020-07-13 VITALS
WEIGHT: 209.81 LBS | DIASTOLIC BLOOD PRESSURE: 68 MMHG | TEMPERATURE: 96 F | BODY MASS INDEX: 37.18 KG/M2 | HEART RATE: 92 BPM | RESPIRATION RATE: 16 BRPM | SYSTOLIC BLOOD PRESSURE: 132 MMHG | HEIGHT: 63 IN

## 2020-07-13 DIAGNOSIS — Z13.31 DEPRESSION SCREENING: ICD-10-CM

## 2020-07-13 DIAGNOSIS — I70.0 ABDOMINAL AORTIC ATHEROSCLEROSIS (HCC): ICD-10-CM

## 2020-07-13 DIAGNOSIS — Z00.00 ENCOUNTER FOR ANNUAL HEALTH EXAMINATION: Primary | ICD-10-CM

## 2020-07-13 DIAGNOSIS — C83.32 DIFFUSE LARGE B-CELL LYMPHOMA OF INTRATHORACIC LYMPH NODES (HCC): ICD-10-CM

## 2020-07-13 DIAGNOSIS — C44.310 BASAL CELL CARCINOMA (BCC) OF SKIN OF FACE, UNSPECIFIED PART OF FACE: ICD-10-CM

## 2020-07-13 DIAGNOSIS — E78.00 PURE HYPERCHOLESTEROLEMIA: ICD-10-CM

## 2020-07-13 DIAGNOSIS — E66.01 SEVERE OBESITY WITH BODY MASS INDEX (BMI) OF 35.0 TO 39.9 WITH SERIOUS COMORBIDITY (HCC): ICD-10-CM

## 2020-07-13 DIAGNOSIS — G25.0 ESSENTIAL TREMOR: ICD-10-CM

## 2020-07-13 DIAGNOSIS — Z80.3 FAMILY HISTORY OF BREAST CANCER IN MOTHER: ICD-10-CM

## 2020-07-13 PROBLEM — M65.332 TRIGGER MIDDLE FINGER OF LEFT HAND: Status: RESOLVED | Noted: 2017-02-22 | Resolved: 2020-07-13

## 2020-07-13 PROCEDURE — G0444 DEPRESSION SCREEN ANNUAL: HCPCS | Performed by: FAMILY MEDICINE

## 2020-07-13 PROCEDURE — G0439 PPPS, SUBSEQ VISIT: HCPCS | Performed by: FAMILY MEDICINE

## 2020-07-13 RX ORDER — RALOXIFENE HYDROCHLORIDE 60 MG/1
60 TABLET, FILM COATED ORAL
Qty: 90 TABLET | Refills: 3 | Status: SHIPPED | OUTPATIENT
Start: 2020-07-13 | End: 2021-07-26

## 2020-07-13 RX ORDER — CELECOXIB 200 MG/1
200 CAPSULE ORAL DAILY
Qty: 90 CAPSULE | Refills: 3 | Status: SHIPPED | OUTPATIENT
Start: 2020-07-13 | End: 2021-01-13

## 2020-07-13 NOTE — PROGRESS NOTES
HPI:   Ernestina Sheth is a 67year old female who presents for a Medicare Subsequent Annual Wellness visit (Pt already had Initial Annual Wellness). Preventative  Breast: h/o fibrocystic breast disease.   Mammogram ordered   Colon: no more indication She has a Power of  for [employer] on file in [address] She has never smoked tobacco.    CAGE Alcohol screening   Brenda Miranda was screened for Alcohol abuse and had a score of 0 so is at low risk.     Patient Care Team: Patient Care Team: Oral Cap, Take 1 capsule (200 mg total) by mouth daily. Raloxifene HCl (EVISTA) 60 MG Oral Tab, Take 1 tablet (60 mg total) by mouth once daily. Desonide 0.05 % External Cream, Apply 1 g topically 2 (two) times daily.  Apply sparingly and rub gently into OF SYSTEMS:   Constitutional: negative  Eyes: negative  Ears, nose, mouth, throat, and face: negative  Respiratory: negative  Cardiovascular: negative  Gastrointestinal: negative  Genitourinary: negative  Neurological: negative      EXAM:   /68   Pul symmetric   Skin: Skin color, texture, turgor normal, no rashes or lesions   Neurologic: Normal        Vaccination History     Immunization History   Administered Date(s) Administered   • Depo-Medrol 40mg Inj 04/12/2017, 07/17/2017   • FLU VACC High Dose 6 35.0 to 39.9 with serious comorbidity ([address]  Weight down some. Working on food choices. 8. Essential tremor  Seeing neuro  No significant improvement on the primidone.      9. Depression screening  routine  - DEPRESSION SCREEN ANNUAL            Diet as yrs age 21-68 or Pap+HPV every 5 yrs age 33-67, age 72 and older at high risk There are no preventive care reminders to display for this patient.  Update Health Maintenance if applicable    Chlamydia  Annually if high risk No results found for: CHLAMYDIA No 07/05/2018 0.78    No flowsheet data found. Drug Serum Conc  Annually No results found for: DIGOXIN, DIG, VALP No flowsheet data found.                Template: [employer] [facility] MEDICARE ANNUAL ASSESSMENT FEMALE [79409]

## 2020-07-13 NOTE — PATIENT INSTRUCTIONS
Pascual [facility]span[facility]'s SCREENING SCHEDULE   Tests on this list are recommended by your physician but may not be covered, or covered at this frequency, by your insurer. Please check with your insurance carrier before scheduling to verify coverage.    PREVENT criteria:   • Men who are 73-68 years old and have smoked more than 100 cigarettes in their lifetime   • Anyone with a family history    Colorectal Cancer Screening  Covered up to Age 76     Colonoscopy Screen   Covered every 10 years- more often if abnorm display for this patient.  Please get this Mammogram regularly   Immunizations      Influenza  Covered Annually Orders placed or performed in visit on 11/06/19   • FLU VACC HIGH DOSE PRSV FREE   Orders placed or performed in visit on 12/06/16   • FLU VACC P forms available on it's website for anyone to review and print using their home computer and printer. (the forms are also available in [address])  [email or web address]. org  This link also has information from the [address] regarding Advance

## 2020-07-15 ENCOUNTER — TELEPHONE (OUTPATIENT)
Dept: FAMILY MEDICINE CLINIC | Facility: CLINIC | Age: 80
End: 2020-07-15

## 2020-07-15 DIAGNOSIS — Z12.31 ENCOUNTER FOR SCREENING MAMMOGRAM FOR BREAST CANCER: Primary | ICD-10-CM

## 2020-07-27 ENCOUNTER — HOSPITAL ENCOUNTER (OUTPATIENT)
Dept: MAMMOGRAPHY | Facility: HOSPITAL | Age: 80
Discharge: HOME | End: 2020-07-27
Attending: FAMILY MEDICINE
Payer: MEDICARE

## 2020-07-27 DIAGNOSIS — Z12.31 ENCOUNTER FOR SCREENING MAMMOGRAM FOR BREAST CANCER: ICD-10-CM

## 2020-07-27 PROCEDURE — 77067 SCR MAMMO BI INCL CAD: CPT | Performed by: FAMILY MEDICINE

## 2020-07-27 PROCEDURE — 77063 BREAST TOMOSYNTHESIS BI: CPT | Performed by: FAMILY MEDICINE

## 2020-07-31 ENCOUNTER — OFFICE VISIT (OUTPATIENT)
Dept: PODIATRY CLINIC | Facility: CLINIC | Age: 80
End: 2020-07-31
Payer: MEDICARE

## 2020-07-31 DIAGNOSIS — H54.8 LEGAL BLINDNESS, AS DEFINED IN USA: ICD-10-CM

## 2020-07-31 DIAGNOSIS — M20.41 HAMMER TOE OF RIGHT FOOT: ICD-10-CM

## 2020-07-31 DIAGNOSIS — B35.1 ONYCHOMYCOSIS: Primary | ICD-10-CM

## 2020-07-31 DIAGNOSIS — M79.674 PAIN OF TOE OF RIGHT FOOT: ICD-10-CM

## 2020-07-31 DIAGNOSIS — L84 HELOMA DURUM: ICD-10-CM

## 2020-07-31 PROCEDURE — 11721 DEBRIDE NAIL 6 OR MORE: CPT | Performed by: PODIATRIST

## 2020-08-01 ENCOUNTER — HOSPITAL (OUTPATIENT)
Dept: OTHER | Age: 80
End: 2020-08-01
Attending: INTERNAL MEDICINE

## 2020-08-02 NOTE — PROGRESS NOTES
Ernestina Sheth is a 79year old female. Patient presents with:  Toenail Care: patient unable to trim her own nails - some discomfort between 4th and 5th toes left foot.         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 The patient indicates understanding of these issues and agrees to the plan.       Karen Russell DPM

## 2020-08-21 ENCOUNTER — PRIOR ORIGINAL RECORDS (OUTPATIENT)
Dept: OTHER | Age: 80
End: 2020-08-21

## 2020-08-21 PROCEDURE — 99214 OFFICE O/P EST MOD 30 MIN: CPT | Performed by: INTERNAL MEDICINE

## 2020-08-24 ENCOUNTER — TELEPHONE (OUTPATIENT)
Dept: FAMILY MEDICINE CLINIC | Facility: CLINIC | Age: 80
End: 2020-08-24

## 2020-08-24 DIAGNOSIS — G25.0 BENIGN ESSENTIAL TREMOR: ICD-10-CM

## 2020-08-24 LAB
ALKALINE PHOSPHATASE: 70
ALT: 81
AST: 90
CREATININE: 0.73 MG/DL
GFR NON-AFRICAN AMERICAN: 78
GLUCOSE: 84
POTASSIUM: 4.4
SODIUM: 138

## 2020-08-24 RX ORDER — PRIMIDONE 50 MG/1
50 TABLET ORAL 3 TIMES DAILY
Qty: 270 TABLET | Refills: 0 | Status: SHIPPED | OUTPATIENT
Start: 2020-08-24 | End: 2020-11-03

## 2020-08-24 NOTE — TELEPHONE ENCOUNTER
Medication increased at [facility]. New Rx pended to reflect.       Medication: Primidone    Date of last refill: 5/4/2020 for #180/1 additional refill  [place]    Last office visit: 5/4/2020  Due back to clinic per last off

## 2020-09-01 ENCOUNTER — HOSPITAL (OUTPATIENT)
Dept: OTHER | Age: 80
End: 2020-09-01
Attending: INTERNAL MEDICINE

## 2020-09-18 ENCOUNTER — TELEPHONE (OUTPATIENT)
Dept: SURGERY | Facility: CLINIC | Age: 80
End: 2020-09-18

## 2020-09-18 ENCOUNTER — OFFICE VISIT (OUTPATIENT)
Dept: SURGERY | Facility: CLINIC | Age: 80
End: 2020-09-18
Payer: MEDICARE

## 2020-09-18 VITALS
HEART RATE: 82 BPM | SYSTOLIC BLOOD PRESSURE: 102 MMHG | HEIGHT: 63 IN | WEIGHT: 209 LBS | BODY MASS INDEX: 37.03 KG/M2 | RESPIRATION RATE: 16 BRPM | DIASTOLIC BLOOD PRESSURE: 80 MMHG

## 2020-09-18 DIAGNOSIS — G25.0 BENIGN ESSENTIAL TREMOR: Primary | ICD-10-CM

## 2020-09-18 PROCEDURE — 99213 OFFICE O/P EST LOW 20 MIN: CPT | Performed by: OTHER

## 2020-09-18 NOTE — PROGRESS NOTES
HPI:    Patient ID: Santo Bagley is a 79year old female. HPI    Patient presents for follow up for essential tremors. States tremors are stable on Primidone but tremors can interfere with certain activities like writing and eating.  She is tolerat Age of Onset   • Breast Cancer Mother 59        B/L, age 59 then 76   • Heart Attack Father    • Other (Other) Father    • Stroke Maternal Grandmother    • Heart Attack Paternal Grandmother    • Ovarian Cancer Maternal Cousin Female 61   • Other (diverticu Cholecalciferol (VITAMIN D) 1000 UNITS Oral Tab Take 1 Tab by mouth daily. • Psyllium (METAMUCIL) 48.57 % Oral Powder Take 1 Packet by mouth daily.        Allergies:  Oxycontin [Oxycodon*    NAUSEA AND VOMITING, DIZZINESS  Adhesive Tape           RASH mg TID.  May take extra 50 mg in am or noon time  We discussed briefly about non-pharmacological intervention-will try wrist weights      Left sciatica- going to see Dr Cecelia Mchugh and management per them      RTC in about 6 months    See orders and medication

## 2020-10-09 LAB
ALBUMIN/GLOB SERPL: 1.5 (CALC) (ref 1–2.5)
ALBUMIN/GLOB SERPL: 1.6 (CALC) (ref 1–2.5)
ALBUMIN/GLOB SERPL: 1.7 (CALC) (ref 1–2.5)
ALBUMIN/GLOB SERPL: 1.8 (CALC) (ref 1–2.5)
ALBUMIN: 4 G/DL (ref 3.6–5.1)
ALBUMIN: 4.1 G/DL (ref 3.6–5.1)
ALBUMIN: 4.1 G/DL (ref 3.6–5.1)
ALBUMIN: 4.2 G/DL (ref 3.6–5.1)
ALBUMIN: 4.3 G/DL (ref 3.6–5.1)
ALBUMIN: 4.4 G/DL (ref 3.6–5.1)
ALBUMIN: 4.5 G/DL (ref 3.6–5.1)
ALKALINE PHOSPHATASE: 55 UNIT/L (ref 33–130)
ALKALINE PHOSPHATASE: 57 UNIT/L (ref 33–130)
ALKALINE PHOSPHATASE: 58 UNIT/L (ref 33–130)
ALKALINE PHOSPHATASE: 58 UNIT/L (ref 33–130)
ALKALINE PHOSPHATASE: 60 UNIT/L (ref 33–130)
ALKALINE PHOSPHATASE: 62 UNIT/L (ref 33–130)
ALKALINE PHOSPHATASE: 73 UNIT/L (ref 33–130)
ALT: 13 UNIT/L (ref 6–29)
ALT: 22 UNIT/L (ref 6–29)
ALT: 27 UNIT/L (ref 6–29)
ALT: 43 UNIT/L (ref 6–29)
ALT: 67 UNIT/L (ref 6–29)
ALT: 72 UNIT/L (ref 6–29)
ALT: 78 UNIT/L (ref 6–29)
AST: 19 UNIT/L (ref 10–35)
AST: 28 UNIT/L (ref 10–35)
AST: 33 UNIT/L (ref 10–35)
AST: 44 UNIT/L (ref 10–35)
AST: 71 UNIT/L (ref 10–35)
AST: 83 UNIT/L (ref 10–35)
AST: 87 UNIT/L (ref 10–35)
BASO%: 0.5 %
BASO%: 0.7 %
BASO%: 0.8 %
BASO%: 0.9 %
BASO%: 1 %
BASO%: 1 %
BASO: 0 10^3/UL
BASO: 0.1 10^3/UL
BILIRUBIN, TOTAL: 0.4 MG/DL (ref 0.2–1.2)
BILIRUBIN, TOTAL: 0.4 MG/DL (ref 0.2–1.2)
BILIRUBIN, TOTAL: 0.5 MG/DL (ref 0.2–1.2)
BILIRUBIN, TOTAL: 0.6 MG/DL (ref 0.2–1.2)
BUN/CREATININE RATIO: ABNORMAL (CALC) (ref 6–22)
BUN/CREATININE RATIO: NORMAL (CALC) (ref 6–22)
CALCIUM: 9.3 MG/DL (ref 8.6–10.4)
CALCIUM: 9.4 MG/DL (ref 8.6–10.4)
CALCIUM: 9.5 MG/DL (ref 8.6–10.4)
CALCIUM: 9.5 MG/DL (ref 8.6–10.4)
CALCIUM: 9.6 MG/DL (ref 8.6–10.4)
CALCIUM: 9.8 MG/DL (ref 8.6–10.4)
CALCIUM: 9.8 MG/DL (ref 8.6–10.4)
CARBON DIOXIDE: 21 MMOL/L (ref 20–31)
CARBON DIOXIDE: 22 MMOL/L (ref 20–31)
CARBON DIOXIDE: 24 MMOL/L (ref 20–32)
CARBON DIOXIDE: 25 MMOL/L (ref 20–32)
CARBON DIOXIDE: 26 MMOL/L (ref 20–31)
CARBON DIOXIDE: 26 MMOL/L (ref 20–32)
CARBON DIOXIDE: 26 MMOL/L (ref 20–32)
CHLORIDE: 102 MMOL/L (ref 98–110)
CHLORIDE: 102 MMOL/L (ref 98–110)
CHLORIDE: 103 MMOL/L (ref 98–110)
CHLORIDE: 104 MMOL/L (ref 98–110)
CHLORIDE: 104 MMOL/L (ref 98–110)
CRCL (C&G) (MOSAIQ HL): 88.07 ML/MIN
CRCL (C&G) (MOSAIQ HL): 89.38 ML/MIN
CRCL (C&G) (MOSAIQ HL): 93.46 ML/MIN
CRCL (C&G) (MOSAIQ HL): 93.49 ML/MIN
CRCL (C&G) (MOSAIQ HL): 94.41 ML/MIN
CRCL (C&G) (MOSAIQ HL): 95.74 ML/MIN
CRCL (C&G) (MOSAIQ HL): 99.78 ML/MIN
CREATININE CLEARANCE (MOSAIQ HL): 60.6 ML/MIN
CREATININE CLEARANCE (MOSAIQ HL): 61.1 ML/MIN
CREATININE CLEARANCE (MOSAIQ HL): 63.7 ML/MIN
CREATININE CLEARANCE (MOSAIQ HL): 63.7 ML/MIN
CREATININE CLEARANCE (MOSAIQ HL): 64.7 ML/MIN
CREATININE CLEARANCE (MOSAIQ HL): 66.4 ML/MIN
CREATININE CLEARANCE (MOSAIQ HL): 66.6 ML/MIN
CREATININE: 0.71 MG/DL (ref 0.6–0.93)
CREATININE: 0.73 MG/DL (ref 0.6–0.93)
CREATININE: 0.74 MG/DL (ref 0.6–0.93)
CREATININE: 0.76 MG/DL (ref 0.6–0.93)
CREATININE: 0.79 MG/DL (ref 0.6–0.93)
EGFR AFRICAN AMERICAN: 84 ML/MIN/1.73M2
EGFR AFRICAN AMERICAN: 87 ML/MIN/1.73M2
EGFR AFRICAN AMERICAN: 91 ML/MIN/1.73M2
EGFR AFRICAN AMERICAN: 93 ML/MIN/1.73M2
EGFR AFRICAN AMERICAN: 95 ML/MIN/1.73M2
EGFR NON-AFR. AMERICAN: 73 ML/MIN/1.73M2
EGFR NON-AFR. AMERICAN: 75 ML/MIN/1.73M2
EGFR NON-AFR. AMERICAN: 78 ML/MIN/1.73M2
EGFR NON-AFR. AMERICAN: 78 ML/MIN/1.73M2
EGFR NON-AFR. AMERICAN: 79 ML/MIN/1.73M2
EGFR NON-AFR. AMERICAN: 81 ML/MIN/1.73M2
EGFR NON-AFR. AMERICAN: 82 ML/MIN/1.73M2
EOS%: 4.1 %
EOS%: 4.6 %
EOS%: 5.1 %
EOS%: 5.7 %
EOS%: 6.1 %
EOS%: 6.6 %
EOS: 0.3 10^3/UL
EOS: 0.4 10^3/UL
GLOBULIN: 2.5 G/DL (CALC) (ref 1.9–3.7)
GLOBULIN: 2.5 G/DL (CALC) (ref 1.9–3.7)
GLOBULIN: 2.6 G/DL (CALC) (ref 1.9–3.7)
GLOBULIN: 2.7 G/DL (CALC) (ref 1.9–3.7)
GLOBULIN: 2.9 G/DL (CALC) (ref 1.9–3.7)
GLUCOSE: 117 MG/DL (ref 65–99)
GLUCOSE: 78 MG/DL (ref 65–99)
GLUCOSE: 80 MG/DL (ref 65–99)
GLUCOSE: 81 MG/DL (ref 65–99)
GLUCOSE: 86 MG/DL (ref 65–99)
GLUCOSE: 91 MG/DL (ref 65–99)
GLUCOSE: 95 MG/DL (ref 65–99)
HCT: 35.1 % (ref 38–54)
HCT: 40.6 % (ref 38–54)
HCT: 41.3 % (ref 38–54)
HCT: 41.6 % (ref 38–54)
HCT: 41.9 % (ref 38–54)
HCT: 42.6 % (ref 38–54)
HGB: 11.3 G/DL (ref 12–18)
HGB: 13.5 G/DL (ref 12–18)
HGB: 13.7 G/DL (ref 12–18)
HGB: 13.9 G/DL (ref 12–18)
HGB: 14.2 G/DL (ref 12–18)
HGB: 14.2 G/DL (ref 12–18)
LD: 147 UNIT/L (ref 120–250)
LD: 153 UNIT/L (ref 120–250)
LD: 153 UNIT/L (ref 120–250)
LD: 157 UNIT/L (ref 120–250)
LD: 169 UNIT/L (ref 120–250)
LD: 179 UNIT/L (ref 120–250)
LD: 342 UNIT/L (ref 120–250)
LYMPH%: 18.4 % (ref 12–44)
LYMPH%: 18.6 % (ref 12–44)
LYMPH%: 18.8 % (ref 12–44)
LYMPH%: 19.6 % (ref 12–44)
LYMPH%: 20.6 % (ref 12–44)
LYMPH%: 23 % (ref 12–44)
LYMPH: 0.9 10^3/UL (ref 0.8–2.8)
LYMPH: 1.1 10^3/UL (ref 0.8–2.8)
LYMPH: 1.2 10^3/UL (ref 0.8–2.8)
LYMPH: 1.6 10^3/UL (ref 0.8–2.8)
MCH: 30.2 PG (ref 26–33)
MCH: 30.5 PG (ref 26–33)
MCH: 30.7 PG (ref 26–33)
MCH: 30.8 PG (ref 26–33)
MCH: 30.9 PG (ref 26–33)
MCH: 31.1 PG (ref 26–33)
MCHC: 32.2 G/DL (ref 31–36)
MCHC: 33.2 G/DL (ref 31–36)
MCHC: 33.3 G/DL (ref 31–36)
MCHC: 33.3 G/DL (ref 31–36)
MCHC: 33.4 G/DL (ref 31–36)
MCHC: 33.9 G/DL (ref 31–36)
MCV: 91.3 FML (ref 82–100)
MCV: 91.9 FML (ref 82–100)
MCV: 92.2 FML (ref 82–100)
MCV: 92.8 FML (ref 82–100)
MCV: 93.1 FML (ref 82–100)
MCV: 93.9 FML (ref 82–100)
MONO%: 6.2 % (ref 2–12)
MONO%: 6.7 % (ref 2–12)
MONO%: 6.8 % (ref 2–12)
MONO%: 6.8 % (ref 2–12)
MONO%: 7.1 % (ref 2–12)
MONO%: 9.1 % (ref 2–12)
MONO: 0.4 10^3/UL (ref 0.2–1)
MONO: 0.5 10^3/UL (ref 0.2–1)
MPV: 10.2 FML (ref 8.6–11.7)
MPV: 8.7 FML (ref 8.6–11.7)
MPV: 9.2 FML (ref 8.6–11.7)
MPV: 9.6 FML (ref 8.6–11.7)
MPV: 9.7 FML (ref 8.6–11.7)
MPV: 9.9 FML (ref 8.6–11.7)
NEUT%: 65.2 % (ref 47–76)
NEUT%: 65.8 % (ref 47–76)
NEUT%: 66.2 % (ref 47–76)
NEUT%: 66.3 % (ref 47–76)
NEUT%: 68.5 % (ref 47–76)
NEUT%: 69.2 % (ref 47–76)
NEUT: 3.3 10^3/UL (ref 1.5–7.1)
NEUT: 3.7 10^3/UL (ref 1.5–7.1)
NEUT: 3.8 10^3/UL (ref 1.5–7.1)
NEUT: 4.1 10^3/UL (ref 1.5–7.1)
NEUT: 4.2 10^3/UL (ref 1.5–7.1)
NEUT: 4.5 10^3/UL (ref 1.5–7.1)
PLT: 157 10^3/UL (ref 150–375)
PLT: 157 10^3/UL (ref 150–375)
PLT: 164 10^3/UL (ref 150–375)
PLT: 172 10^3/UL (ref 150–375)
PLT: 182 10^3/UL (ref 150–375)
PLT: 300 10^3/UL (ref 150–375)
POTASSIUM: 4.3 MMOL/L (ref 3.5–5.3)
POTASSIUM: 4.3 MMOL/L (ref 3.5–5.3)
POTASSIUM: 4.4 MMOL/L (ref 3.5–5.3)
POTASSIUM: 4.4 MMOL/L (ref 3.5–5.3)
POTASSIUM: 4.6 MMOL/L (ref 3.5–5.3)
PROTEIN, TOTAL: 6.6 G/DL (ref 6.1–8.1)
PROTEIN, TOTAL: 6.7 G/DL (ref 6.1–8.1)
PROTEIN, TOTAL: 6.7 G/DL (ref 6.1–8.1)
PROTEIN, TOTAL: 6.8 G/DL (ref 6.1–8.1)
PROTEIN, TOTAL: 6.9 G/DL (ref 6.1–8.1)
PROTEIN, TOTAL: 6.9 G/DL (ref 6.1–8.1)
PROTEIN, TOTAL: 7.4 G/DL (ref 6.1–8.1)
RBC: 3.74 10^6/UL (ref 4.2–6.2)
RBC: 4.42 10^6/UL (ref 4.2–6.2)
RBC: 4.45 10^6/UL (ref 4.2–6.2)
RBC: 4.47 10^6/UL (ref 4.2–6.2)
RBC: 4.59 10^6/UL (ref 4.2–6.2)
RBC: 4.62 10^6/UL (ref 4.2–6.2)
RDW-CV: 12.8 %
RDW-CV: 12.9 %
RDW-CV: 12.9 %
RDW-CV: 13 %
RDW-CV: 13.2 %
RDW-CV: 14 %
RDW-SD: 42.5 FML (ref 36–50)
RDW-SD: 42.7 FML (ref 36–50)
RDW-SD: 43 FML (ref 36–50)
RDW-SD: 43.1 FML (ref 36–50)
RDW-SD: 43.1 FML (ref 36–50)
RDW-SD: 46.2 FML (ref 36–50)
SED RATE BY MODIFIED$WESTERGRE: 9 MM/H
SODIUM: 136 MMOL/L (ref 135–146)
SODIUM: 136 MMOL/L (ref 135–146)
SODIUM: 138 MMOL/L (ref 135–146)
SODIUM: 139 MMOL/L (ref 135–146)
UREA NITROGEN (BUN): 15 MG/DL (ref 7–25)
UREA NITROGEN (BUN): 17 MG/DL (ref 7–25)
UREA NITROGEN (BUN): 18 MG/DL (ref 7–25)
UREA NITROGEN (BUN): 19 MG/DL (ref 7–25)
UREA NITROGEN (BUN): 22 MG/DL (ref 7–25)
WBC: 4.9 10^3/UL (ref 4.3–11)
WBC: 5.6 10^3/UL (ref 4.3–11)
WBC: 5.8 10^3/UL (ref 4.3–11)
WBC: 5.9 10^3/UL (ref 4.3–11)
WBC: 6.1 10^3/UL (ref 4.3–11)
WBC: 6.8 10^3/UL (ref 4.3–11)

## 2020-10-11 VITALS — WEIGHT: 205.01 LBS | DIASTOLIC BLOOD PRESSURE: 66 MMHG | SYSTOLIC BLOOD PRESSURE: 130 MMHG

## 2020-10-11 VITALS
WEIGHT: 203 LBS | HEIGHT: 64 IN | DIASTOLIC BLOOD PRESSURE: 70 MMHG | SYSTOLIC BLOOD PRESSURE: 132 MMHG | BODY MASS INDEX: 34.66 KG/M2

## 2020-10-11 VITALS
WEIGHT: 214 LBS | HEIGHT: 64 IN | BODY MASS INDEX: 36.54 KG/M2 | SYSTOLIC BLOOD PRESSURE: 147 MMHG | DIASTOLIC BLOOD PRESSURE: 78 MMHG

## 2020-10-11 VITALS
DIASTOLIC BLOOD PRESSURE: 62 MMHG | SYSTOLIC BLOOD PRESSURE: 120 MMHG | BODY MASS INDEX: 33.8 KG/M2 | WEIGHT: 198 LBS | HEIGHT: 64 IN

## 2020-10-11 VITALS — SYSTOLIC BLOOD PRESSURE: 125 MMHG | DIASTOLIC BLOOD PRESSURE: 78 MMHG | WEIGHT: 210.01 LBS

## 2020-10-11 VITALS — DIASTOLIC BLOOD PRESSURE: 78 MMHG | SYSTOLIC BLOOD PRESSURE: 136 MMHG | WEIGHT: 192.99 LBS

## 2020-10-13 VITALS — WEIGHT: 209 LBS | SYSTOLIC BLOOD PRESSURE: 142 MMHG | DIASTOLIC BLOOD PRESSURE: 72 MMHG | BODY MASS INDEX: 35.87 KG/M2

## 2020-10-13 LAB
BASO%: 0.9 %
BASO: 0.1 10^3/UL
EOS%: 4.9 %
EOS: 0.3 10^3/UL
HCT: 42.4 % (ref 38–54)
HGB: 14.1 G/DL (ref 12–18)
LYMPH%: 23.2 % (ref 12–44)
LYMPH: 1.4 10^3/UL (ref 0.8–2.8)
MCH: 31.4 PG (ref 26–33)
MCHC: 33.3 G/DL (ref 31–36)
MCV: 94.4 FML (ref 82–100)
MONO%: 7 % (ref 2–12)
MONO: 0.4 10^3/UL (ref 0.2–1)
MPV: 10.7 FML (ref 8.6–11.7)
NEUT%: 64 % (ref 47–76)
NEUT: 3.8 10^3/UL (ref 1.5–7.1)
PLT: 92 10^3/UL (ref 150–375)
RBC: 4.49 10^6/UL (ref 4.2–6.2)
RDW-CV: 13 %
RDW-SD: 43.9 FML (ref 36–50)
WBC: 5.9 10^3/UL (ref 4.3–11)

## 2020-10-16 ENCOUNTER — IMMUNIZATION (OUTPATIENT)
Dept: FAMILY MEDICINE CLINIC | Facility: CLINIC | Age: 80
End: 2020-10-16
Payer: MEDICARE

## 2020-10-16 DIAGNOSIS — Z23 NEED FOR VACCINATION: ICD-10-CM

## 2020-10-16 PROCEDURE — G0008 ADMIN INFLUENZA VIRUS VAC: HCPCS | Performed by: FAMILY MEDICINE

## 2020-10-16 PROCEDURE — 90662 IIV NO PRSV INCREASED AG IM: CPT | Performed by: FAMILY MEDICINE

## 2020-10-30 DIAGNOSIS — G25.0 BENIGN ESSENTIAL TREMOR: ICD-10-CM

## 2020-11-02 NOTE — TELEPHONE ENCOUNTER
Medication: PRIMIDONE 50 MG Oral Tab    Date of last refill: 08/24/2020 (#270/0)  [place]    Last office visit: 09/18/2020  Due back to clinic per last office note:  6 months  Date next office visit scheduled:     Fut

## 2020-11-03 RX ORDER — PRIMIDONE 50 MG/1
TABLET ORAL
Qty: 270 TABLET | Refills: 0 | Status: SHIPPED | OUTPATIENT
Start: 2020-11-03 | End: 2020-12-10

## 2020-11-06 ENCOUNTER — OFFICE VISIT (OUTPATIENT)
Dept: PODIATRY CLINIC | Facility: CLINIC | Age: 80
End: 2020-11-06
Payer: MEDICARE

## 2020-11-06 DIAGNOSIS — B35.1 ONYCHOMYCOSIS: Primary | ICD-10-CM

## 2020-11-06 DIAGNOSIS — L84 HELOMA DURUM: ICD-10-CM

## 2020-11-06 DIAGNOSIS — M79.674 PAIN OF TOE OF RIGHT FOOT: ICD-10-CM

## 2020-11-06 DIAGNOSIS — C83.32 DIFFUSE LARGE B-CELL LYMPHOMA OF INTRATHORACIC LYMPH NODES (HCC): ICD-10-CM

## 2020-11-06 DIAGNOSIS — M20.41 HAMMER TOE OF RIGHT FOOT: ICD-10-CM

## 2020-11-06 DIAGNOSIS — H54.8 LEGAL BLINDNESS, AS DEFINED IN USA: ICD-10-CM

## 2020-11-06 PROCEDURE — 11721 DEBRIDE NAIL 6 OR MORE: CPT | Performed by: PODIATRIST

## 2020-11-13 NOTE — PROGRESS NOTES
Hamzah Mariee is a 79year old female.  Patient presents with:  Toenail Care: Patient here for Toenail care and irritation between 4th and 5th toe on Left foot - 0/10 Pain        HPI:   Patient returns to the clinic she thinks her toenails are still th vision in left eye- glasses for right eye      Past Surgical History:   Procedure Laterality Date   • COLONOSCOPY  2004   • COLONOSCOPY N/A 10/23/2014    Performed by Nikolay Garza MD at [address] [place] [address] ENDOSCOPY   • D & C  1982   • DIL URETHRA,FEMALE,INITIAL  2004 otherwise  SKIN: denies any unusual skin lesions or rashes  RESPIRATORY: denies shortness of breath with exertion  CARDIOVASCULAR: denies chest pain on exertion  GI: denies abdominal pain and denies heartburn  NEURO: denies headaches    EXAM:   There were indicates understanding of these issues and agrees to the plan.       Viktoria Adkins DPM

## 2020-11-27 ENCOUNTER — TELEPHONE (OUTPATIENT)
Dept: FAMILY MEDICINE CLINIC | Facility: CLINIC | Age: 80
End: 2020-11-27

## 2020-11-27 NOTE — TELEPHONE ENCOUNTER
Called and talked to [facility] and this med needs prior Lorie Garay they stated this is an FET patient and we need to call [phone]

## 2020-11-27 NOTE — TELEPHONE ENCOUNTER
Patient is calling she is saying that [facility] told her they do not have anymore refills for her Celecoxib  200 mg oral cap.  90 day with 3 refills was sent on 7/13/20 to them with receipt confirmed.   Please call and then call patient back to let her k

## 2020-11-30 PROBLEM — M48.00 SPINAL STENOSIS: Status: ACTIVE | Noted: 2020-11-30

## 2020-12-08 NOTE — TELEPHONE ENCOUNTER
Received Faxed response from [employer] regarding coverage of Celebrex 200mgs which was denied coverage because continuing using for treatment of pain for longer than 3 months does not establish medical necessity  Patient can use [facility] for discounted rate

## 2020-12-10 DIAGNOSIS — G25.0 BENIGN ESSENTIAL TREMOR: ICD-10-CM

## 2020-12-10 RX ORDER — PRIMIDONE 50 MG/1
TABLET ORAL
Qty: 270 TABLET | Refills: 0 | Status: SHIPPED | OUTPATIENT
Start: 2020-12-10 | End: 2021-03-17

## 2020-12-10 NOTE — TELEPHONE ENCOUNTER
Medication: PRIMIDONE 50 MG Oral Tab    Date of last refill: 11/03/2020 (#270/0)  [place]    Last office visit: 09/18/2020  Due back to clinic per last office note:  6 moint  Date next office visit scheduled:     Fu

## 2020-12-31 ENCOUNTER — PRIOR ORIGINAL RECORDS (OUTPATIENT)
Dept: OTHER | Age: 80
End: 2020-12-31

## 2021-01-13 ENCOUNTER — OFFICE VISIT (OUTPATIENT)
Dept: FAMILY MEDICINE CLINIC | Facility: CLINIC | Age: 81
End: 2021-01-13
Payer: MEDICARE

## 2021-01-13 VITALS
SYSTOLIC BLOOD PRESSURE: 134 MMHG | DIASTOLIC BLOOD PRESSURE: 70 MMHG | RESPIRATION RATE: 16 BRPM | HEART RATE: 84 BPM | BODY MASS INDEX: 36.75 KG/M2 | HEIGHT: 63.5 IN | TEMPERATURE: 97 F | OXYGEN SATURATION: 98 % | WEIGHT: 210 LBS

## 2021-01-13 DIAGNOSIS — M25.50 ARTHRALGIA, UNSPECIFIED JOINT: Primary | ICD-10-CM

## 2021-01-13 DIAGNOSIS — R10.30 LOWER ABDOMINAL PAIN: ICD-10-CM

## 2021-01-13 PROCEDURE — 99213 OFFICE O/P EST LOW 20 MIN: CPT | Performed by: FAMILY MEDICINE

## 2021-01-13 RX ORDER — DICYCLOMINE HYDROCHLORIDE 10 MG/1
10 CAPSULE ORAL 3 TIMES DAILY PRN
Qty: 30 CAPSULE | Refills: 0 | Status: SHIPPED | OUTPATIENT
Start: 2021-01-13 | End: 2021-01-23

## 2021-01-13 RX ORDER — CELECOXIB 200 MG/1
200 CAPSULE ORAL DAILY
Qty: 90 CAPSULE | Refills: 3 | Status: SHIPPED | OUTPATIENT
Start: 2021-01-13

## 2021-01-13 NOTE — PROGRESS NOTES
Patient presents with:  Medication Problem: Unable to get script for Celebrex per INS & Pharmacy     HPI:   Matias Arredondo is a 79year old female who presents to the office for pain discussion.   She has chronic arthritic pains, taking Celebrex 200mg d Take 1 capsule (200 mg total) by mouth daily. Dispense: 90 capsule; Refill: 3    2.  Lower abdominal pain  Unclear cause  The location and spasms and bowel changes suggestive of diverticulitis, but the duration does not match  Last c-scope about 15 yrs ago

## 2021-01-29 ENCOUNTER — IMMUNIZATION (OUTPATIENT)
Dept: LAB | Facility: HOSPITAL | Age: 81
End: 2021-01-29
Attending: EMERGENCY MEDICINE
Payer: MEDICARE

## 2021-01-29 DIAGNOSIS — Z23 NEED FOR VACCINATION: Primary | ICD-10-CM

## 2021-01-29 PROCEDURE — 0011A SARSCOV2 VAC 100MCG/0.5ML IM: CPT

## 2021-02-05 ENCOUNTER — OFFICE VISIT (OUTPATIENT)
Dept: PODIATRY CLINIC | Facility: CLINIC | Age: 81
End: 2021-02-05
Payer: MEDICARE

## 2021-02-05 DIAGNOSIS — M20.41 HAMMER TOE OF RIGHT FOOT: ICD-10-CM

## 2021-02-05 DIAGNOSIS — L84 HELOMA DURUM: ICD-10-CM

## 2021-02-05 DIAGNOSIS — B35.1 ONYCHOMYCOSIS: ICD-10-CM

## 2021-02-05 DIAGNOSIS — H54.8 LEGAL BLINDNESS, AS DEFINED IN USA: ICD-10-CM

## 2021-02-05 DIAGNOSIS — M79.674 PAIN OF TOE OF RIGHT FOOT: ICD-10-CM

## 2021-02-05 PROCEDURE — 11721 DEBRIDE NAIL 6 OR MORE: CPT | Performed by: PODIATRIST

## 2021-02-05 RX ORDER — CHOLECALCIFEROL (VITAMIN D3) 125 MCG
CAPSULE ORAL
COMMUNITY
Start: 2021-02-01 | End: 2021-07-26

## 2021-02-09 NOTE — PROGRESS NOTES
Felice Vasquez is a 79year old female. Patient presents with:  Toenail Care: nail and callus trimming - patient unable to do it herself. HPI:   Patient returns to the clinic she thinks her toenails are still thick but continued to improve.   She localized, unspecified site    • Visual impairment     no vision in left eye- glasses for right eye      Past Surgical History:   Procedure Laterality Date   • COLONOSCOPY  2004   • COLONOSCOPY N/A 10/23/2014    Performed by Renee Dejesus MD at [address] as documented below  GENERAL HEALTH: feels well otherwise  SKIN: denies any unusual skin lesions or rashes  RESPIRATORY: denies shortness of breath with exertion  CARDIOVASCULAR: denies chest pain on exertion  GI: denies abdominal pain and denies heartburn they would not get ingrown. The patient indicates understanding of these issues and agrees to the plan.       Basim Stoddard DPM

## 2021-02-19 ENCOUNTER — OFFICE VISIT (OUTPATIENT)
Dept: HEMATOLOGY/ONCOLOGY | Age: 81
End: 2021-02-19

## 2021-02-19 ENCOUNTER — HOSPITAL ENCOUNTER (OUTPATIENT)
Dept: LAB | Age: 81
Discharge: HOME | End: 2021-02-19
Attending: INTERNAL MEDICINE

## 2021-02-19 VITALS
OXYGEN SATURATION: 95 % | SYSTOLIC BLOOD PRESSURE: 140 MMHG | DIASTOLIC BLOOD PRESSURE: 71 MMHG | WEIGHT: 209.7 LBS | BODY MASS INDEX: 35.99 KG/M2 | HEART RATE: 77 BPM

## 2021-02-19 DIAGNOSIS — Z85.72 HISTORY OF LYMPHOMA: ICD-10-CM

## 2021-02-19 DIAGNOSIS — Z85.72 HISTORY OF LYMPHOMA: Primary | ICD-10-CM

## 2021-02-19 LAB
ALBUMIN SERPL-MCNC: 3.9 G/DL (ref 3.6–5.1)
ALBUMIN/GLOB SERPL: 1.1 {RATIO} (ref 1–2.4)
ALP SERPL-CCNC: 66 UNITS/L (ref 45–117)
ALT SERPL-CCNC: 64 UNITS/L
ANION GAP SERPL CALC-SCNC: 7 MMOL/L (ref 10–20)
AST SERPL-CCNC: 67 UNITS/L
BASOPHILS # BLD: 0.1 K/MCL (ref 0–0.3)
BASOPHILS NFR BLD: 1 %
BILIRUB SERPL-MCNC: 0.2 MG/DL (ref 0.2–1)
BUN SERPL-MCNC: 18 MG/DL (ref 6–20)
BUN/CREAT SERPL: 26 (ref 7–25)
CALCIUM SERPL-MCNC: 9 MG/DL (ref 8.4–10.2)
CHLORIDE SERPL-SCNC: 106 MMOL/L (ref 98–107)
CO2 SERPL-SCNC: 30 MMOL/L (ref 21–32)
CREAT SERPL-MCNC: 0.7 MG/DL (ref 0.51–0.95)
DEPRECATED RDW RBC: 43.8 FL (ref 39–50)
EOSINOPHIL # BLD: 0.3 K/MCL (ref 0–0.5)
EOSINOPHIL NFR BLD: 5 %
ERYTHROCYTE [DISTWIDTH] IN BLOOD: 12.6 % (ref 11–15)
FASTING DURATION TIME PATIENT: ABNORMAL H
GFR SERPLBLD BASED ON 1.73 SQ M-ARVRAT: 83 ML/MIN/1.73M2
GLOBULIN SER-MCNC: 3.7 G/DL (ref 2–4)
GLUCOSE SERPL-MCNC: 96 MG/DL (ref 65–99)
HCT VFR BLD CALC: 42.8 % (ref 36–46.5)
HGB BLD-MCNC: 14 G/DL (ref 12–15.5)
IMM GRANULOCYTES # BLD AUTO: 0 K/MCL (ref 0–0.2)
IMM GRANULOCYTES # BLD: 0 %
LDH SERPL L TO P-CCNC: 227 UNITS/L (ref 82–240)
LYMPHOCYTES # BLD: 1.6 K/MCL (ref 1–4)
LYMPHOCYTES NFR BLD: 24 %
MCH RBC QN AUTO: 31 PG (ref 26–34)
MCHC RBC AUTO-ENTMCNC: 32.7 G/DL (ref 32–36.5)
MCV RBC AUTO: 94.9 FL (ref 78–100)
MONOCYTES # BLD: 0.4 K/MCL (ref 0.3–0.9)
MONOCYTES NFR BLD: 6 %
NEUTROPHILS # BLD: 4.2 K/MCL (ref 1.8–7.7)
NEUTROPHILS NFR BLD: 64 %
NRBC BLD MANUAL-RTO: 0 /100 WBC
PLATELET # BLD AUTO: 166 K/MCL (ref 140–450)
POTASSIUM SERPL-SCNC: 4.6 MMOL/L (ref 3.4–5.1)
PROT SERPL-MCNC: 7.6 G/DL (ref 6.4–8.2)
RBC # BLD: 4.51 MIL/MCL (ref 4–5.2)
SODIUM SERPL-SCNC: 138 MMOL/L (ref 135–145)
WBC # BLD: 6.6 K/MCL (ref 4.2–11)

## 2021-02-19 PROCEDURE — 85025 COMPLETE CBC W/AUTO DIFF WBC: CPT | Performed by: INTERNAL MEDICINE

## 2021-02-19 PROCEDURE — 99214 OFFICE O/P EST MOD 30 MIN: CPT | Performed by: INTERNAL MEDICINE

## 2021-02-19 PROCEDURE — 83615 LACTATE (LD) (LDH) ENZYME: CPT | Performed by: INTERNAL MEDICINE

## 2021-02-19 PROCEDURE — 80053 COMPREHEN METABOLIC PANEL: CPT | Performed by: INTERNAL MEDICINE

## 2021-02-19 PROCEDURE — 36415 COLL VENOUS BLD VENIPUNCTURE: CPT | Performed by: INTERNAL MEDICINE

## 2021-02-19 RX ORDER — CHLORAL HYDRATE 500 MG
1000 CAPSULE ORAL
COMMUNITY

## 2021-02-19 RX ORDER — DESONIDE 0.5 MG/G
1 CREAM TOPICAL
COMMUNITY
Start: 2020-07-06

## 2021-02-19 RX ORDER — CHOLECALCIFEROL (VITAMIN D3) 125 MCG
CAPSULE ORAL
COMMUNITY
Start: 2021-02-01 | End: 2022-02-18 | Stop reason: ALTCHOICE

## 2021-02-19 RX ORDER — FOLIC ACID/MULTIVIT,IRON,MINER .4-18-35
1 TABLET,CHEWABLE ORAL
COMMUNITY

## 2021-02-19 SDOH — HEALTH STABILITY: MENTAL HEALTH: HOW OFTEN DO YOU HAVE A DRINK CONTAINING ALCOHOL?: NEVER

## 2021-02-19 ASSESSMENT — ENCOUNTER SYMPTOMS
WEAKNESS: 0
HEADACHES: 0
COUGH: 0
ACTIVITY CHANGE: 0
SLEEP DISTURBANCE: 0
BLOOD IN STOOL: 0
VOICE CHANGE: 0
VOMITING: 0
CHEST TIGHTNESS: 0
SPEECH DIFFICULTY: 0
DIAPHORESIS: 0
WHEEZING: 0
BRUISES/BLEEDS EASILY: 0
UNEXPECTED WEIGHT CHANGE: 0
NAUSEA: 0
ABDOMINAL PAIN: 0
FEVER: 0
APNEA: 0
SHORTNESS OF BREATH: 0
DIARRHEA: 0
ABDOMINAL DISTENTION: 0
APPETITE CHANGE: 0
CHILLS: 0
TROUBLE SWALLOWING: 0
CHOKING: 0
FATIGUE: 0
CONSTIPATION: 0
BACK PAIN: 0
DIZZINESS: 0
ADENOPATHY: 0
CONFUSION: 0
LIGHT-HEADEDNESS: 0

## 2021-02-19 ASSESSMENT — PATIENT HEALTH QUESTIONNAIRE - PHQ9
1. LITTLE INTEREST OR PLEASURE IN DOING THINGS: NOT AT ALL
SUM OF ALL RESPONSES TO PHQ9 QUESTIONS 1 AND 2: 0
SUM OF ALL RESPONSES TO PHQ9 QUESTIONS 1 AND 2: 0
CLINICAL INTERPRETATION OF PHQ2 SCORE: NO FURTHER SCREENING NEEDED
CLINICAL INTERPRETATION OF PHQ9 SCORE: NO FURTHER SCREENING NEEDED
2. FEELING DOWN, DEPRESSED OR HOPELESS: NOT AT ALL

## 2021-02-26 ENCOUNTER — IMMUNIZATION (OUTPATIENT)
Dept: LAB | Facility: HOSPITAL | Age: 81
End: 2021-02-26
Attending: EMERGENCY MEDICINE
Payer: MEDICARE

## 2021-02-26 DIAGNOSIS — Z23 NEED FOR VACCINATION: Primary | ICD-10-CM

## 2021-02-26 PROCEDURE — 0012A SARSCOV2 VAC 100MCG/0.5ML IM: CPT

## 2021-03-17 ENCOUNTER — OFFICE VISIT (OUTPATIENT)
Dept: NEUROLOGY | Facility: CLINIC | Age: 81
End: 2021-03-17
Payer: MEDICARE

## 2021-03-17 VITALS
HEART RATE: 76 BPM | DIASTOLIC BLOOD PRESSURE: 76 MMHG | SYSTOLIC BLOOD PRESSURE: 132 MMHG | BODY MASS INDEX: 37 KG/M2 | WEIGHT: 210 LBS | RESPIRATION RATE: 16 BRPM

## 2021-03-17 DIAGNOSIS — R42 DIZZINESS: ICD-10-CM

## 2021-03-17 DIAGNOSIS — G25.0 BENIGN ESSENTIAL TREMOR: ICD-10-CM

## 2021-03-17 PROCEDURE — 99213 OFFICE O/P EST LOW 20 MIN: CPT | Performed by: OTHER

## 2021-03-17 RX ORDER — PRIMIDONE 50 MG/1
TABLET ORAL
Qty: 270 TABLET | Refills: 1 | Status: SHIPPED | OUTPATIENT
Start: 2021-03-17 | End: 2021-08-16

## 2021-03-17 NOTE — PROGRESS NOTES
Patient states increase in tremors since last office visit. Patient states tremors are from her waist up. Patient states dizziness on and off. Denies blurry vision and headaches.

## 2021-03-17 NOTE — PROGRESS NOTES
HPI:    Patient ID: Natacha Samuels is a 79year old female. HPI    Patient presents for follow up for essential tremors. States tremors have mildly improved Primidone still interfering with certain activities like writing and eating.  She is tolerati of supraclavicle lymphoma   • OTHER SURGICAL HISTORY Left 07/2020, 08/2020    BASAL cell carcinoma removal of eyebrow   • TUBAL LIGATION  1976      Family History   Problem Relation Age of Onset   • Breast Cancer Mother 59        B/L, age 59 then 76   • He mg by mouth daily. • Multivitamin Chewtab, ADULT, Oral Chew Tab Chew 1 tablet by mouth daily. • Omega-3 Fatty Acids (FISH OIL) 1000 MG Oral Cap Take 1,000 mg by mouth daily. • Calcium 500 MG Oral Chew Tab Chew 1 tablet by mouth daily.      • C thumb and mild head tremor noted.   Strength is 5/5 in all muscle groups  Reflexes: symmetric and present  Coordination: Intact finger to nose test with postural tremor  Gait: Normal based and steady           ASSESSMENT/PLAN:   Benign essential tremor  Diz

## 2021-04-29 ENCOUNTER — OFFICE VISIT (OUTPATIENT)
Dept: PODIATRY CLINIC | Facility: CLINIC | Age: 81
End: 2021-04-29
Payer: MEDICARE

## 2021-04-29 DIAGNOSIS — H54.8 LEGAL BLINDNESS, AS DEFINED IN USA: ICD-10-CM

## 2021-04-29 DIAGNOSIS — M79.674 PAIN OF TOE OF RIGHT FOOT: ICD-10-CM

## 2021-04-29 DIAGNOSIS — C83.32 DIFFUSE LARGE B-CELL LYMPHOMA OF INTRATHORACIC LYMPH NODES (HCC): ICD-10-CM

## 2021-04-29 DIAGNOSIS — B35.1 ONYCHOMYCOSIS: Primary | ICD-10-CM

## 2021-04-29 DIAGNOSIS — L84 HELOMA DURUM: ICD-10-CM

## 2021-04-29 DIAGNOSIS — M20.41 HAMMER TOE OF RIGHT FOOT: ICD-10-CM

## 2021-04-29 PROCEDURE — 11721 DEBRIDE NAIL 6 OR MORE: CPT | Performed by: PODIATRIST

## 2021-04-29 NOTE — PROGRESS NOTES
Mena Laughlin is a 79year old female. Patient presents with:  Toenail Care: nail trimming - fu on fungal nails. HPI:   Patient returns to the clinic for routine care she is legally blind. She is unable to trim her toenails herself.   She is he or localized, unspecified site    • Visual impairment     no vision in left eye- glasses for right eye      Past Surgical History:   Procedure Laterality Date   • COLONOSCOPY  2004   • COLONOSCOPY N/A 10/23/2014    Procedure: COLONOSCOPY;  Surgeon: Melisa Liu, shower          REVIEW OF SYSTEMS:   Review of Systems  Today reviewed systens as documented below  GENERAL HEALTH: feels well otherwise  SKIN: denies any unusual skin lesions or rashes  RESPIRATORY: denies shortness of breath with exertion  CARDIOVASCULAR was no hemorrhage. There is mild incurvation of the medial and lateral nail borders of the hallux which using a slant back technique were removed and they would not get ingrown.      The patient indicates understanding of these issues and agrees to the francois

## 2021-06-23 ENCOUNTER — TELEPHONE (OUTPATIENT)
Dept: FAMILY MEDICINE CLINIC | Facility: CLINIC | Age: 81
End: 2021-06-23

## 2021-06-23 DIAGNOSIS — E78.00 PURE HYPERCHOLESTEROLEMIA: ICD-10-CM

## 2021-06-23 DIAGNOSIS — C83.32 DIFFUSE LARGE B-CELL LYMPHOMA OF INTRATHORACIC LYMPH NODES (HCC): Primary | ICD-10-CM

## 2021-06-23 DIAGNOSIS — E55.9 VITAMIN D DEFICIENCY: ICD-10-CM

## 2021-06-23 DIAGNOSIS — Z12.31 ENCOUNTER FOR SCREENING MAMMOGRAM FOR BREAST CANCER: Primary | ICD-10-CM

## 2021-06-23 NOTE — TELEPHONE ENCOUNTER
Pt is scheduled to come in for Px on 7/26/21. Pt is requesting an order for mammogram. Please place order.

## 2021-06-23 NOTE — TELEPHONE ENCOUNTER
Called and notified patient of new order for mammogram she would alos like yash placed will send to Dr Rosemary Clement to place labs

## 2021-06-23 NOTE — TELEPHONE ENCOUNTER
Please enter lab orders for the patient's upcoming physical appointment. Physical scheduled:    Your appointments     Date & Time Appointment Department [facility])    Jul 26, 2021 11:30 AM CDT Medicare Annual Well Visit with MD Flor Gramajo

## 2021-06-24 NOTE — TELEPHONE ENCOUNTER
Called pt  answered placed me on hold and call was disconnected. Please advise pt labs were ordered to fast for 10-12 hours.

## 2021-07-08 ENCOUNTER — LAB ENCOUNTER (OUTPATIENT)
Dept: LAB | Facility: HOSPITAL | Age: 81
End: 2021-07-08
Attending: FAMILY MEDICINE
Payer: MEDICARE

## 2021-07-08 DIAGNOSIS — E78.00 PURE HYPERCHOLESTEROLEMIA: ICD-10-CM

## 2021-07-08 DIAGNOSIS — C83.32 DIFFUSE LARGE B-CELL LYMPHOMA OF INTRATHORACIC LYMPH NODES (HCC): ICD-10-CM

## 2021-07-08 DIAGNOSIS — E55.9 VITAMIN D DEFICIENCY: ICD-10-CM

## 2021-07-08 LAB
ALBUMIN SERPL-MCNC: 3.7 G/DL (ref 3.4–5)
ALBUMIN/GLOB SERPL: 1.1 {RATIO} (ref 1–2)
ALP LIVER SERPL-CCNC: 57 U/L
ALT SERPL-CCNC: 45 U/L
ANION GAP SERPL CALC-SCNC: 2 MMOL/L (ref 0–18)
AST SERPL-CCNC: 35 U/L (ref 15–37)
BASOPHILS # BLD AUTO: 0.06 X10(3) UL (ref 0–0.2)
BASOPHILS NFR BLD AUTO: 1.1 %
BILIRUB SERPL-MCNC: 0.4 MG/DL (ref 0.1–2)
BUN BLD-MCNC: 15 MG/DL (ref 7–18)
BUN/CREAT SERPL: 20.5 (ref 10–20)
CALCIUM BLD-MCNC: 9.3 MG/DL (ref 8.5–10.1)
CHLORIDE SERPL-SCNC: 107 MMOL/L (ref 98–112)
CHOLEST SMN-MCNC: 233 MG/DL (ref ?–200)
CO2 SERPL-SCNC: 30 MMOL/L (ref 21–32)
CREAT BLD-MCNC: 0.73 MG/DL
DEPRECATED RDW RBC AUTO: 44 FL (ref 35.1–46.3)
EOSINOPHIL # BLD AUTO: 0.28 X10(3) UL (ref 0–0.7)
EOSINOPHIL NFR BLD AUTO: 5.1 %
ERYTHROCYTE [DISTWIDTH] IN BLOOD BY AUTOMATED COUNT: 12.7 % (ref 11–15)
GLOBULIN PLAS-MCNC: 3.4 G/DL (ref 2.8–4.4)
GLUCOSE BLD-MCNC: 92 MG/DL (ref 70–99)
HCT VFR BLD AUTO: 42.1 %
HDLC SERPL-MCNC: 52 MG/DL (ref 40–59)
HGB BLD-MCNC: 14 G/DL
IMM GRANULOCYTES # BLD AUTO: 0.01 X10(3) UL (ref 0–1)
IMM GRANULOCYTES NFR BLD: 0.2 %
LDLC SERPL CALC-MCNC: 143 MG/DL (ref ?–100)
LYMPHOCYTES # BLD AUTO: 1.34 X10(3) UL (ref 1–4)
LYMPHOCYTES NFR BLD AUTO: 24.5 %
M PROTEIN MFR SERPL ELPH: 7.1 G/DL (ref 6.4–8.2)
MCH RBC QN AUTO: 31.4 PG (ref 26–34)
MCHC RBC AUTO-ENTMCNC: 33.3 G/DL (ref 31–37)
MCV RBC AUTO: 94.4 FL
MONOCYTES # BLD AUTO: 0.35 X10(3) UL (ref 0.1–1)
MONOCYTES NFR BLD AUTO: 6.4 %
NEUTROPHILS # BLD AUTO: 3.44 X10 (3) UL (ref 1.5–7.7)
NEUTROPHILS # BLD AUTO: 3.44 X10(3) UL (ref 1.5–7.7)
NEUTROPHILS NFR BLD AUTO: 62.7 %
NONHDLC SERPL-MCNC: 181 MG/DL (ref ?–130)
OSMOLALITY SERPL CALC.SUM OF ELEC: 288 MOSM/KG (ref 275–295)
PATIENT FASTING Y/N/NP: YES
PATIENT FASTING Y/N/NP: YES
PLATELET # BLD AUTO: 147 10(3)UL (ref 150–450)
POTASSIUM SERPL-SCNC: 4.6 MMOL/L (ref 3.5–5.1)
RBC # BLD AUTO: 4.46 X10(6)UL
SODIUM SERPL-SCNC: 139 MMOL/L (ref 136–145)
TRIGL SERPL-MCNC: 210 MG/DL (ref 30–149)
VIT D+METAB SERPL-MCNC: 50.2 NG/ML (ref 30–100)
VLDLC SERPL CALC-MCNC: 40 MG/DL (ref 0–30)
WBC # BLD AUTO: 5.5 X10(3) UL (ref 4–11)

## 2021-07-08 PROCEDURE — 80053 COMPREHEN METABOLIC PANEL: CPT

## 2021-07-08 PROCEDURE — 82306 VITAMIN D 25 HYDROXY: CPT

## 2021-07-08 PROCEDURE — 80061 LIPID PANEL: CPT

## 2021-07-08 PROCEDURE — 36415 COLL VENOUS BLD VENIPUNCTURE: CPT

## 2021-07-08 PROCEDURE — 85025 COMPLETE CBC W/AUTO DIFF WBC: CPT

## 2021-07-12 ENCOUNTER — OFFICE VISIT (OUTPATIENT)
Dept: PODIATRY CLINIC | Facility: CLINIC | Age: 81
End: 2021-07-12
Payer: MEDICARE

## 2021-07-12 VITALS — WEIGHT: 210 LBS | BODY MASS INDEX: 36.75 KG/M2 | HEIGHT: 63.5 IN

## 2021-07-12 DIAGNOSIS — M79.675 PAIN IN TOE OF LEFT FOOT: ICD-10-CM

## 2021-07-12 DIAGNOSIS — L84 HELOMA DURUM: ICD-10-CM

## 2021-07-12 DIAGNOSIS — H54.8 LEGAL BLINDNESS, AS DEFINED IN USA: ICD-10-CM

## 2021-07-12 DIAGNOSIS — B35.1 ONYCHOMYCOSIS: Primary | ICD-10-CM

## 2021-07-12 DIAGNOSIS — M79.674 PAIN OF TOE OF RIGHT FOOT: ICD-10-CM

## 2021-07-12 PROCEDURE — 11721 DEBRIDE NAIL 6 OR MORE: CPT | Performed by: PODIATRIST

## 2021-07-12 NOTE — PROGRESS NOTES
Ernestina Sheth is a 79year old female. Patient presents with:  Toenail Care: routine, feels well        HPI:   Patient returns to the clinic for routine care she is legally blind. She is unable to trim her toenails herself.   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 The patient indicates understanding of these issues and agrees to the plan.       Kalie Durand DPM

## 2021-07-19 ENCOUNTER — TELEPHONE (OUTPATIENT)
Dept: FAMILY MEDICINE CLINIC | Facility: CLINIC | Age: 81
End: 2021-07-19

## 2021-07-26 ENCOUNTER — OFFICE VISIT (OUTPATIENT)
Dept: FAMILY MEDICINE CLINIC | Facility: CLINIC | Age: 81
End: 2021-07-26
Payer: MEDICARE

## 2021-07-26 VITALS
BODY MASS INDEX: 35.44 KG/M2 | WEIGHT: 210.13 LBS | RESPIRATION RATE: 16 BRPM | OXYGEN SATURATION: 96 % | HEIGHT: 64.5 IN | SYSTOLIC BLOOD PRESSURE: 134 MMHG | TEMPERATURE: 98 F | DIASTOLIC BLOOD PRESSURE: 64 MMHG | HEART RATE: 72 BPM

## 2021-07-26 DIAGNOSIS — Z00.00 ENCOUNTER FOR ANNUAL HEALTH EXAMINATION: Primary | ICD-10-CM

## 2021-07-26 DIAGNOSIS — G25.0 ESSENTIAL TREMOR: ICD-10-CM

## 2021-07-26 DIAGNOSIS — Z13.31 DEPRESSION SCREENING: ICD-10-CM

## 2021-07-26 DIAGNOSIS — Z80.3 FAMILY HISTORY OF BREAST CANCER IN MOTHER: ICD-10-CM

## 2021-07-26 DIAGNOSIS — I70.0 ABDOMINAL AORTIC ATHEROSCLEROSIS (HCC): ICD-10-CM

## 2021-07-26 DIAGNOSIS — E66.01 SEVERE OBESITY WITH BODY MASS INDEX (BMI) OF 35.0 TO 39.9 WITH SERIOUS COMORBIDITY (HCC): ICD-10-CM

## 2021-07-26 DIAGNOSIS — E78.00 PURE HYPERCHOLESTEROLEMIA: ICD-10-CM

## 2021-07-26 DIAGNOSIS — C83.32 DIFFUSE LARGE B-CELL LYMPHOMA OF INTRATHORACIC LYMPH NODES (HCC): ICD-10-CM

## 2021-07-26 PROCEDURE — G0439 PPPS, SUBSEQ VISIT: HCPCS | Performed by: FAMILY MEDICINE

## 2021-07-26 PROCEDURE — G0444 DEPRESSION SCREEN ANNUAL: HCPCS | Performed by: FAMILY MEDICINE

## 2021-07-26 RX ORDER — RALOXIFENE HYDROCHLORIDE 60 MG/1
60 TABLET, FILM COATED ORAL
Qty: 90 TABLET | Refills: 3 | Status: SHIPPED | OUTPATIENT
Start: 2021-07-26

## 2021-07-26 NOTE — PROGRESS NOTES
HPI:   Ross Banda is a 79year old female who presents for a Medicare Subsequent Annual Wellness visit (Pt already had Initial Annual Wellness). Preventative  Breast: 7/2020. Repeat ordered for Friday. Fibrocystic disease known.    Colon: no in Problems?: Yes       Depression Screening (PHQ-2/PHQ-9): Over the LAST 2 WEEKS   Little interest or pleasure in doing things: Not at all  Feeling down, depressed, or hopeless: Not at all  PHQ-2 SCORE: 0      Advanced Directive:  She does NOT have a Living 07/08/2021    TSH 4.460 06/11/2015    CREATSERUM 0.73 07/08/2021    GLU 92 07/08/2021        CBC  (most recent labs)   Lab Results   Component Value Date    WBC 5.5 07/08/2021    HGB 14.0 07/08/2021    .0 (L) 07/08/2021        ALLERGIES:   She is al family history includes Breast Cancer (age of onset: 59) in her mother; Breast Cancer (age of onset: 79) in her maternal aunt; Heart Attack in her father and paternal grandmother; Heart Disease in her brother;  Other in her father; Ovarian Cancer (age of on normal    Vaccination History     Immunization History   Administered Date(s) Administered   • Covid-19 Vaccine Moderna 100 mcg/0.5 ml 01/29/2021, 02/26/2021   • Depo-Medrol 40mg Inj 04/12/2017, 07/17/2017   • FLU VAC High Dose 65 YRS & Older PRSV Free (90 indicates understanding of these issues and agrees to the plan. Reinforced healthy diet, lifestyle, and exercise. Return in 1 year (on 7/26/2022) for Wellness Visit.      Tonny Harris MD, 7/26/2021     General Health     In the past six months, have history -     Colorectal Cancer Screening  Covered for ages 52-80; only need ONE of the following:    Colonoscopy   Covered every 10 years    Covered every 2 years if patient is at high risk or previous colonoscopy was abnormal -    No recommendations at t Medications (ACE/ARB, digoxin diuretics, anticonvulsants)    Potassium Annually Lab Results   Component Value Date    K 4.6 07/08/2021         Creatinine   Annually Lab Results   Component Value Date    CREATSERUM 0.73 07/08/2021         BUN Annually Lab R

## 2021-07-26 NOTE — PATIENT INSTRUCTIONS
Remberto Espana's SCREENING SCHEDULE   Tests on this list are recommended by your physician but may not be covered, or covered at this frequency, by your insurer. Please check with your insurance carrier before scheduling to verify coverage.    Michael Marcos 07/12/2019      No recommendations at this time   Pap and Pelvic    Pap   Covered every 2 years for women at normal risk;  Annually if at high risk -  No recommendations at this time    Chlamydia Annually if high risk -  No recommendations at this time   Sc http://www. [place]ph.state. [place]/public/books/advin.htm  A link to the [employer]. This site has a lot of good information including definitions of the different types of Advance Directives.  It also has the State forms available on it's webs

## 2021-07-29 ENCOUNTER — HOSPITAL ENCOUNTER (OUTPATIENT)
Dept: MAMMOGRAPHY | Facility: HOSPITAL | Age: 81
Discharge: HOME | End: 2021-07-29
Attending: FAMILY MEDICINE
Payer: MEDICARE

## 2021-07-29 DIAGNOSIS — Z12.31 ENCOUNTER FOR SCREENING MAMMOGRAM FOR BREAST CANCER: ICD-10-CM

## 2021-07-29 PROCEDURE — 77067 SCR MAMMO BI INCL CAD: CPT | Performed by: FAMILY MEDICINE

## 2021-07-29 PROCEDURE — 77063 BREAST TOMOSYNTHESIS BI: CPT | Performed by: FAMILY MEDICINE

## 2021-08-16 DIAGNOSIS — G25.0 BENIGN ESSENTIAL TREMOR: ICD-10-CM

## 2021-08-16 RX ORDER — PRIMIDONE 50 MG/1
TABLET ORAL
Qty: 450 TABLET | Refills: 0 | Status: SHIPPED | OUTPATIENT
Start: 2021-08-16 | End: 2021-11-18

## 2021-08-16 NOTE — TELEPHONE ENCOUNTER
Medication: PRIMIDONE 50 MG     Date of last refill: 3/17/21 (#270/1)  [place]    Last office visit: 3/17/21  Due back to clinic per last office note:  6 mo  Date next office visit scheduled:    Future Appointments

## 2021-10-11 ENCOUNTER — OFFICE VISIT (OUTPATIENT)
Dept: PODIATRY CLINIC | Facility: CLINIC | Age: 81
End: 2021-10-11
Payer: MEDICARE

## 2021-10-11 DIAGNOSIS — H54.8 LEGAL BLINDNESS, AS DEFINED IN USA: ICD-10-CM

## 2021-10-11 DIAGNOSIS — M79.675 PAIN IN TOE OF LEFT FOOT: ICD-10-CM

## 2021-10-11 DIAGNOSIS — L84 HELOMA DURUM: ICD-10-CM

## 2021-10-11 DIAGNOSIS — M79.674 PAIN OF TOE OF RIGHT FOOT: ICD-10-CM

## 2021-10-11 DIAGNOSIS — M20.41 HAMMER TOE OF RIGHT FOOT: ICD-10-CM

## 2021-10-11 DIAGNOSIS — B35.1 ONYCHOMYCOSIS: Primary | ICD-10-CM

## 2021-10-11 PROCEDURE — 11721 DEBRIDE NAIL 6 OR MORE: CPT | Performed by: PODIATRIST

## 2021-10-11 NOTE — PROGRESS NOTES
Brenda Miranda is a [de-identified]year old female. Patient presents with:  Toenail Care: [de-identified]year old female unable to trim her own nails. Patient denies any pain in the office        HPI:   Patient returns to the clinic for routine care she is legally blind.   She impairment     no vision in left eye- glasses for right eye      Past Surgical History:   Procedure Laterality Date   • COLONOSCOPY  2004   • COLONOSCOPY N/A 10/23/2014    Procedure: COLONOSCOPY;  Surgeon: Margie Motta MD;  Location: [facility] ENDOSCOPY   • D rashes  RESPIRATORY: denies shortness of breath with exertion  CARDIOVASCULAR: denies chest pain on exertion  GI: denies abdominal pain and denies heartburn  NEURO: denies headaches    EXAM:   There were no vitals taken for this visit.   Physical Exam  GENE of these issues and agrees to the plan.       Sarah Estrada DPM

## 2021-11-05 ENCOUNTER — IMMUNIZATION (OUTPATIENT)
Dept: FAMILY MEDICINE CLINIC | Facility: CLINIC | Age: 81
End: 2021-11-05
Payer: MEDICARE

## 2021-11-05 DIAGNOSIS — Z23 NEED FOR VACCINATION: Primary | ICD-10-CM

## 2021-11-05 PROCEDURE — 90662 IIV NO PRSV INCREASED AG IM: CPT | Performed by: FAMILY MEDICINE

## 2021-11-05 PROCEDURE — G0008 ADMIN INFLUENZA VIRUS VAC: HCPCS | Performed by: FAMILY MEDICINE

## 2021-11-15 ENCOUNTER — TELEPHONE (OUTPATIENT)
Dept: FAMILY MEDICINE CLINIC | Facility: CLINIC | Age: 81
End: 2021-11-15

## 2021-11-15 NOTE — TELEPHONE ENCOUNTER
Pt dropped off her [employer] form requesting it be filled out. Once completed she will pick it up. Please call when ready.  Form in Dr Raj Jaimes (needs renewal in Jan)

## 2021-11-18 DIAGNOSIS — G25.0 BENIGN ESSENTIAL TREMOR: ICD-10-CM

## 2021-11-18 RX ORDER — PRIMIDONE 50 MG/1
TABLET ORAL
Qty: 450 TABLET | Refills: 0 | Status: SHIPPED | OUTPATIENT
Start: 2021-11-18 | End: 2022-01-20

## 2021-11-18 NOTE — TELEPHONE ENCOUNTER
Medication: primidone 50 MG Oral Tab     Date of last refill: 8/16/21 (#450/0)  [place]     Last office visit: 3/17/21  Due back to clinic per last office note:  6 months  Date next office visit scheduled:    Future

## 2021-11-18 NOTE — TELEPHONE ENCOUNTER
Patient calling to request refill of [facility], [address] [place] [address] to [facility], [phone], [phone]    Patient informed of 48 hour refill policy excluding weeke

## 2021-11-23 NOTE — TELEPHONE ENCOUNTER
Spoke to patient to let her know that her form was ready for . Copied and sent to scan. Completed form in front office drawer awaiting .

## 2022-01-01 ENCOUNTER — EXTERNAL RECORD (OUTPATIENT)
Dept: INFUSION THERAPY | Age: 82
End: 2022-01-01

## 2022-01-11 ENCOUNTER — OFFICE VISIT (OUTPATIENT)
Dept: PODIATRY CLINIC | Facility: CLINIC | Age: 82
End: 2022-01-11
Payer: MEDICARE

## 2022-01-11 DIAGNOSIS — M20.41 HAMMER TOE OF RIGHT FOOT: ICD-10-CM

## 2022-01-11 DIAGNOSIS — M79.675 PAIN IN TOE OF LEFT FOOT: ICD-10-CM

## 2022-01-11 DIAGNOSIS — L84 HELOMA DURUM: ICD-10-CM

## 2022-01-11 DIAGNOSIS — H54.8 LEGAL BLINDNESS, AS DEFINED IN USA: ICD-10-CM

## 2022-01-11 DIAGNOSIS — C83.32 DIFFUSE LARGE B-CELL LYMPHOMA OF INTRATHORACIC LYMPH NODES (HCC): ICD-10-CM

## 2022-01-11 DIAGNOSIS — B35.1 ONYCHOMYCOSIS: Primary | ICD-10-CM

## 2022-01-11 DIAGNOSIS — M79.674 PAIN OF TOE OF RIGHT FOOT: ICD-10-CM

## 2022-01-11 PROCEDURE — 11721 DEBRIDE NAIL 6 OR MORE: CPT | Performed by: PODIATRIST

## 2022-01-11 NOTE — PROGRESS NOTES
Alejandro Bryan is a [de-identified]year old female. Patient presents with:  Toenail Care: Right Great toe fungus. Patient denies any pain at this time. HPI:   Patient returns to the clinic for routine care she is legally blind.   She is unable to trim her t left eye- glasses for right eye      Past Surgical History:   Procedure Laterality Date   • COLONOSCOPY  2004   • COLONOSCOPY N/A 10/23/2014    Procedure: COLONOSCOPY;  Surgeon: Yolanda Barbosa MD;  Location: [facility] ENDOSCOPY   • D & C  1982   • KAMAR Thornton breath with exertion  CARDIOVASCULAR: denies chest pain on exertion  GI: denies abdominal pain and denies heartburn  NEURO: denies headaches    EXAM:   There were no vitals taken for this visit.   Physical Exam  GENERAL: well developed, well nourished, in n patient indicates understanding of these issues and agrees to the plan.       Savana Arango DPM

## 2022-01-20 DIAGNOSIS — G25.0 BENIGN ESSENTIAL TREMOR: ICD-10-CM

## 2022-01-20 RX ORDER — PRIMIDONE 50 MG/1
TABLET ORAL
Qty: 450 TABLET | Refills: 0 | Status: SHIPPED | OUTPATIENT
Start: 2022-01-20

## 2022-01-20 NOTE — TELEPHONE ENCOUNTER
Medication: primidone 50 MG Oral Tab     Date of last refill: 11/18/21 (#450/0)  [place]     Last office visit: 3/17/21  Due back to clinic per last office note:  6 months  Date next office visit scheduled:    Future

## 2022-01-20 NOTE — TELEPHONE ENCOUNTER
Patient calling to request refill of [address] [facility], [address] [place] [address] to [facility], [phone], [phone]    Patient informed of 48 hour refill policy excluding weeke

## 2022-02-18 ENCOUNTER — HOSPITAL ENCOUNTER (OUTPATIENT)
Dept: LAB | Age: 82
Discharge: STILL ADMITTED OR ADMINISTRATIVE | End: 2022-02-18
Attending: INTERNAL MEDICINE

## 2022-02-18 ENCOUNTER — OFFICE VISIT (OUTPATIENT)
Dept: HEMATOLOGY/ONCOLOGY | Age: 82
End: 2022-02-18
Attending: INTERNAL MEDICINE

## 2022-02-18 VITALS
WEIGHT: 209 LBS | DIASTOLIC BLOOD PRESSURE: 78 MMHG | RESPIRATION RATE: 20 BRPM | TEMPERATURE: 97.1 F | HEIGHT: 64 IN | HEART RATE: 76 BPM | SYSTOLIC BLOOD PRESSURE: 148 MMHG | BODY MASS INDEX: 35.68 KG/M2 | OXYGEN SATURATION: 96 %

## 2022-02-18 DIAGNOSIS — Z85.72 HISTORY OF LYMPHOMA: ICD-10-CM

## 2022-02-18 DIAGNOSIS — C82.01 GRADE 1 FOLLICULAR LYMPHOMA OF LYMPH NODES OF NECK (CMD): Primary | ICD-10-CM

## 2022-02-18 LAB
ALBUMIN SERPL-MCNC: 4.2 G/DL (ref 3.6–5.1)
ALBUMIN/GLOB SERPL: 1.2 {RATIO} (ref 1–2.4)
ALP SERPL-CCNC: 70 UNITS/L (ref 45–117)
ALT SERPL-CCNC: 39 UNITS/L
ANION GAP SERPL CALC-SCNC: 9 MMOL/L (ref 10–20)
AST SERPL-CCNC: 35 UNITS/L
BASOPHILS # BLD: 0.1 K/MCL (ref 0–0.3)
BASOPHILS NFR BLD: 1 %
BILIRUB SERPL-MCNC: 0.2 MG/DL (ref 0.2–1)
BUN SERPL-MCNC: 16 MG/DL (ref 6–20)
BUN/CREAT SERPL: 20 (ref 7–25)
CALCIUM SERPL-MCNC: 9.4 MG/DL (ref 8.4–10.2)
CHLORIDE SERPL-SCNC: 105 MMOL/L (ref 98–107)
CO2 SERPL-SCNC: 28 MMOL/L (ref 21–32)
CREAT SERPL-MCNC: 0.79 MG/DL (ref 0.51–0.95)
DEPRECATED RDW RBC: 45.1 FL (ref 39–50)
EOSINOPHIL # BLD: 0.3 K/MCL (ref 0–0.5)
EOSINOPHIL NFR BLD: 5 %
ERYTHROCYTE [DISTWIDTH] IN BLOOD: 12.8 % (ref 11–15)
FASTING DURATION TIME PATIENT: ABNORMAL H
GFR SERPLBLD BASED ON 1.73 SQ M-ARVRAT: 71 ML/MIN
GLOBULIN SER-MCNC: 3.6 G/DL (ref 2–4)
GLUCOSE SERPL-MCNC: 92 MG/DL (ref 70–99)
HCT VFR BLD CALC: 45.5 % (ref 36–46.5)
HGB BLD-MCNC: 14.8 G/DL (ref 12–15.5)
IMM GRANULOCYTES # BLD AUTO: 0 K/MCL (ref 0–0.2)
IMM GRANULOCYTES # BLD: 0 %
LDH SERPL L TO P-CCNC: 211 UNITS/L (ref 82–240)
LYMPHOCYTES # BLD: 1.4 K/MCL (ref 1–4)
LYMPHOCYTES NFR BLD: 21 %
MCH RBC QN AUTO: 30.9 PG (ref 26–34)
MCHC RBC AUTO-ENTMCNC: 32.5 G/DL (ref 32–36.5)
MCV RBC AUTO: 95 FL (ref 78–100)
MONOCYTES # BLD: 0.3 K/MCL (ref 0.3–0.9)
MONOCYTES NFR BLD: 5 %
NEUTROPHILS # BLD: 4.5 K/MCL (ref 1.8–7.7)
NEUTROPHILS NFR BLD: 68 %
NRBC BLD MANUAL-RTO: 0 /100 WBC
PLATELET # BLD AUTO: 168 K/MCL (ref 140–450)
POTASSIUM SERPL-SCNC: 4 MMOL/L (ref 3.4–5.1)
PROT SERPL-MCNC: 7.8 G/DL (ref 6.4–8.2)
RBC # BLD: 4.79 MIL/MCL (ref 4–5.2)
SODIUM SERPL-SCNC: 138 MMOL/L (ref 135–145)
WBC # BLD: 6.7 K/MCL (ref 4.2–11)

## 2022-02-18 PROCEDURE — 83615 LACTATE (LD) (LDH) ENZYME: CPT | Performed by: INTERNAL MEDICINE

## 2022-02-18 PROCEDURE — 99214 OFFICE O/P EST MOD 30 MIN: CPT | Performed by: INTERNAL MEDICINE

## 2022-02-18 PROCEDURE — 85025 COMPLETE CBC W/AUTO DIFF WBC: CPT | Performed by: INTERNAL MEDICINE

## 2022-02-18 PROCEDURE — 80053 COMPREHEN METABOLIC PANEL: CPT | Performed by: INTERNAL MEDICINE

## 2022-02-18 PROCEDURE — 36415 COLL VENOUS BLD VENIPUNCTURE: CPT | Performed by: INTERNAL MEDICINE

## 2022-02-18 PROCEDURE — 99211 OFF/OP EST MAY X REQ PHY/QHP: CPT

## 2022-02-18 ASSESSMENT — ENCOUNTER SYMPTOMS
SPEECH DIFFICULTY: 0
HEADACHES: 0
LIGHT-HEADEDNESS: 0
CONSTIPATION: 0
BLOOD IN STOOL: 0
WHEEZING: 0
ABDOMINAL PAIN: 0
CHEST TIGHTNESS: 0
FEVER: 0
BRUISES/BLEEDS EASILY: 0
APPETITE CHANGE: 0
NAUSEA: 0
UNEXPECTED WEIGHT CHANGE: 0
SHORTNESS OF BREATH: 0
ACTIVITY CHANGE: 0
APNEA: 0
CHOKING: 0
DIAPHORESIS: 0
ABDOMINAL DISTENTION: 0
VOICE CHANGE: 0
WEAKNESS: 0
SLEEP DISTURBANCE: 0
COUGH: 0
CHILLS: 0
ADENOPATHY: 0
DIARRHEA: 0
FATIGUE: 0
BACK PAIN: 0
DIZZINESS: 0
VOMITING: 0
TROUBLE SWALLOWING: 0
CONFUSION: 0

## 2022-02-18 ASSESSMENT — PATIENT HEALTH QUESTIONNAIRE - PHQ9
2. FEELING DOWN, DEPRESSED OR HOPELESS: NOT AT ALL
CLINICAL INTERPRETATION OF PHQ2 SCORE: NO FURTHER SCREENING NEEDED
SUM OF ALL RESPONSES TO PHQ9 QUESTIONS 1 AND 2: 0
1. LITTLE INTEREST OR PLEASURE IN DOING THINGS: NOT AT ALL
SUM OF ALL RESPONSES TO PHQ9 QUESTIONS 1 AND 2: 0

## 2022-02-24 ENCOUNTER — OFFICE VISIT (OUTPATIENT)
Dept: NEUROLOGY | Facility: CLINIC | Age: 82
End: 2022-02-24
Payer: MEDICARE

## 2022-02-24 VITALS
DIASTOLIC BLOOD PRESSURE: 62 MMHG | BODY MASS INDEX: 35 KG/M2 | HEART RATE: 76 BPM | RESPIRATION RATE: 16 BRPM | WEIGHT: 209 LBS | SYSTOLIC BLOOD PRESSURE: 124 MMHG

## 2022-02-24 DIAGNOSIS — G25.0 BENIGN ESSENTIAL TREMOR: Primary | ICD-10-CM

## 2022-02-24 PROCEDURE — 99213 OFFICE O/P EST LOW 20 MIN: CPT | Performed by: OTHER

## 2022-02-24 RX ORDER — PRIMIDONE 50 MG/1
TABLET ORAL
Qty: 210 TABLET | Refills: 5 | Status: SHIPPED | OUTPATIENT
Start: 2022-02-24

## 2022-02-24 NOTE — PROGRESS NOTES
Patient reports tremors a little worse, especially with fine motor skills. Still has dizziness when first gets up in the morning.

## 2022-03-23 RX ORDER — CELECOXIB 200 MG/1
200 CAPSULE ORAL DAILY
Qty: 90 CAPSULE | Refills: 3 | Status: SHIPPED | OUTPATIENT
Start: 2022-03-23

## 2022-03-23 RX ORDER — CELECOXIB 200 MG/1
CAPSULE ORAL
Qty: 90 CAPSULE | Refills: 0 | OUTPATIENT
Start: 2022-03-23

## 2022-03-23 NOTE — TELEPHONE ENCOUNTER
Request denied per patient request. See telephone encounter from today. She does not want it to go to this pharmacy.

## 2022-03-23 NOTE — TELEPHONE ENCOUNTER
Patient is in [place] on vacation and she is going to run out of her Celecoxib 200 mg can we call it into [facility] in [place] and she will transfer [facility] on [address] In [facility]  [phone]. Please delete request from [facility] in [place] too far away from resort.

## 2022-04-11 ENCOUNTER — OFFICE VISIT (OUTPATIENT)
Dept: PODIATRY CLINIC | Facility: CLINIC | Age: 82
End: 2022-04-11
Payer: MEDICARE

## 2022-04-11 DIAGNOSIS — L84 HELOMA DURUM: ICD-10-CM

## 2022-04-11 DIAGNOSIS — M79.674 PAIN OF TOE OF RIGHT FOOT: ICD-10-CM

## 2022-04-11 DIAGNOSIS — M79.675 PAIN IN TOE OF LEFT FOOT: ICD-10-CM

## 2022-04-11 DIAGNOSIS — M20.41 HAMMER TOE OF RIGHT FOOT: ICD-10-CM

## 2022-04-11 DIAGNOSIS — H54.8 LEGAL BLINDNESS, AS DEFINED IN USA: ICD-10-CM

## 2022-04-11 DIAGNOSIS — B35.1 ONYCHOMYCOSIS: Primary | ICD-10-CM

## 2022-04-11 PROCEDURE — 11721 DEBRIDE NAIL 6 OR MORE: CPT | Performed by: PODIATRIST

## 2022-04-13 ENCOUNTER — HOSPITAL ENCOUNTER (OUTPATIENT)
Dept: NUCLEAR MEDICINE | Facility: HOSPITAL | Age: 82
Discharge: HOME | End: 2022-04-13
Attending: INTERNAL MEDICINE
Payer: MEDICARE

## 2022-04-13 DIAGNOSIS — C82.01 GRADE 1 FOLLICULAR LYMPHOMA OF LYMPH NODES OF NECK (HCC): ICD-10-CM

## 2022-04-13 LAB — GLUCOSE BLD-MCNC: 97 MG/DL (ref 70–99)

## 2022-04-13 PROCEDURE — 78815 PET IMAGE W/CT SKULL-THIGH: CPT | Performed by: INTERNAL MEDICINE

## 2022-04-13 PROCEDURE — 82962 GLUCOSE BLOOD TEST: CPT

## 2022-04-15 ENCOUNTER — TELEPHONE (OUTPATIENT)
Dept: NEUROLOGY | Facility: CLINIC | Age: 82
End: 2022-04-15

## 2022-04-15 RX ORDER — PRIMIDONE 50 MG/1
TABLET ORAL
Qty: 210 TABLET | Refills: 3 | Status: SHIPPED | OUTPATIENT
Start: 2022-04-15

## 2022-04-18 ENCOUNTER — TELEPHONE (OUTPATIENT)
Dept: HEMATOLOGY/ONCOLOGY | Age: 82
End: 2022-04-18

## 2022-04-21 ENCOUNTER — OFFICE VISIT (OUTPATIENT)
Dept: HEMATOLOGY/ONCOLOGY | Age: 82
End: 2022-04-21
Attending: INTERNAL MEDICINE

## 2022-04-21 VITALS
WEIGHT: 208.9 LBS | HEIGHT: 64 IN | TEMPERATURE: 98 F | SYSTOLIC BLOOD PRESSURE: 138 MMHG | OXYGEN SATURATION: 96 % | DIASTOLIC BLOOD PRESSURE: 65 MMHG | HEART RATE: 76 BPM | BODY MASS INDEX: 35.67 KG/M2

## 2022-04-21 DIAGNOSIS — C82.01 GRADE 1 FOLLICULAR LYMPHOMA OF LYMPH NODES OF NECK (CMD): Primary | ICD-10-CM

## 2022-04-21 PROCEDURE — 99211 OFF/OP EST MAY X REQ PHY/QHP: CPT

## 2022-04-21 PROCEDURE — 99214 OFFICE O/P EST MOD 30 MIN: CPT | Performed by: INTERNAL MEDICINE

## 2022-04-21 ASSESSMENT — ENCOUNTER SYMPTOMS
HEADACHES: 0
APPETITE CHANGE: 0
WHEEZING: 0
FEVER: 0
ACTIVITY CHANGE: 0
COUGH: 0
CHOKING: 0
ABDOMINAL DISTENTION: 0
DIAPHORESIS: 0
VOICE CHANGE: 0
WEAKNESS: 0
BLOOD IN STOOL: 0
VOMITING: 0
FATIGUE: 0
DIZZINESS: 0
CONFUSION: 0
APNEA: 0
BACK PAIN: 0
BRUISES/BLEEDS EASILY: 0
SPEECH DIFFICULTY: 0
UNEXPECTED WEIGHT CHANGE: 0
CHEST TIGHTNESS: 0
TROUBLE SWALLOWING: 0
LIGHT-HEADEDNESS: 0
ADENOPATHY: 0
SHORTNESS OF BREATH: 0
ABDOMINAL PAIN: 0
CONSTIPATION: 0
DIARRHEA: 0
NAUSEA: 0
SLEEP DISTURBANCE: 0
CHILLS: 0

## 2022-04-21 ASSESSMENT — PATIENT HEALTH QUESTIONNAIRE - PHQ9
SUM OF ALL RESPONSES TO PHQ9 QUESTIONS 1 AND 2: 0
2. FEELING DOWN, DEPRESSED OR HOPELESS: NOT AT ALL
SUM OF ALL RESPONSES TO PHQ9 QUESTIONS 1 AND 2: 0
CLINICAL INTERPRETATION OF PHQ2 SCORE: NO FURTHER SCREENING NEEDED
1. LITTLE INTEREST OR PLEASURE IN DOING THINGS: NOT AT ALL

## 2022-05-01 ENCOUNTER — LAB ENCOUNTER (OUTPATIENT)
Dept: LAB | Facility: HOSPITAL | Age: 82
End: 2022-05-01
Attending: INTERNAL MEDICINE
Payer: MEDICARE

## 2022-05-01 DIAGNOSIS — Z01.818 PREOP EXAMINATION: ICD-10-CM

## 2022-05-01 DIAGNOSIS — Z11.59 SCREENING FOR VIRAL DISEASE: ICD-10-CM

## 2022-05-02 LAB — SARS-COV-2 RNA RESP QL NAA+PROBE: NOT DETECTED

## 2022-05-04 ENCOUNTER — HOSPITAL ENCOUNTER (OUTPATIENT)
Dept: ULTRASOUND IMAGING | Facility: HOSPITAL | Age: 82
Discharge: HOME | End: 2022-05-04
Attending: INTERNAL MEDICINE
Payer: MEDICARE

## 2022-05-04 ENCOUNTER — HOSPITAL ENCOUNTER (OUTPATIENT)
Dept: ULTRASOUND IMAGING | Facility: HOSPITAL | Age: 82
End: 2022-05-04
Attending: INTERNAL MEDICINE
Payer: MEDICARE

## 2022-05-04 DIAGNOSIS — C82.01 GRADE 1 FOLLICULAR LYMPHOMA OF LYMPH NODES OF NECK (HCC): ICD-10-CM

## 2022-05-04 PROCEDURE — 88172 CYTP DX EVAL FNA 1ST EA SITE: CPT | Performed by: INTERNAL MEDICINE

## 2022-05-04 PROCEDURE — 88184 FLOWCYTOMETRY/ TC 1 MARKER: CPT | Performed by: INTERNAL MEDICINE

## 2022-05-04 PROCEDURE — 88177 CYTP FNA EVAL EA ADDL: CPT | Performed by: INTERNAL MEDICINE

## 2022-05-04 PROCEDURE — 10005 FNA BX W/US GDN 1ST LES: CPT | Performed by: INTERNAL MEDICINE

## 2022-05-04 PROCEDURE — 88185 FLOWCYTOMETRY/TC ADD-ON: CPT | Performed by: INTERNAL MEDICINE

## 2022-05-04 PROCEDURE — 88173 CYTOPATH EVAL FNA REPORT: CPT | Performed by: INTERNAL MEDICINE

## 2022-05-04 PROCEDURE — 38505 NEEDLE BIOPSY LYMPH NODES: CPT | Performed by: INTERNAL MEDICINE

## 2022-05-04 PROCEDURE — 76942 ECHO GUIDE FOR BIOPSY: CPT | Performed by: INTERNAL MEDICINE

## 2022-05-04 PROCEDURE — 88342 IMHCHEM/IMCYTCHM 1ST ANTB: CPT | Performed by: INTERNAL MEDICINE

## 2022-05-04 PROCEDURE — 88307 TISSUE EXAM BY PATHOLOGIST: CPT | Performed by: INTERNAL MEDICINE

## 2022-05-04 PROCEDURE — 88341 IMHCHEM/IMCYTCHM EA ADD ANTB: CPT | Performed by: INTERNAL MEDICINE

## 2022-05-04 NOTE — PROCEDURES
[facility]  Procedure Note    Carmina Hatchet Patient Status:  Outpatient    1940 MRN [id number]   [facility] ULTRASOUND Attending Cuong Pastor MD   Hosp Day # 0 PCP Marlin Mc MD     Procedure: US core needle biopsy L cervical node, FNA L axillary node    Pre-Procedure Diagnosis:  Lymphoma    Post-Procedure Diagnosis: Same    Anesthesia:  Local    Findings:  Technically challenging due to location and proximity to vessels. Total of 2 25g FNA of L cervical node and 3 x 20g core biopsies. L axillary node unable to core due to location relative to axillary artery, however 4 x 22g FNA and 2 x 25g FNA performed.     Specimens: As above    Blood Loss:  Minimal    Tourniquet Time: None  Complications:  None  Drains:  None    Secondary Diagnosis:  None    Lucien Gowers, MD  2022

## 2022-05-08 LAB
CD10 CELLS NFR SPEC: 34 %
CD11C CELLS NFR SPEC: 4 %
CD14 CELLS NFR SPEC: <1 %
CD19 CELLS NFR SPEC: 39 %
CD19/CD10 CELLS: 34 %
CD20 CELLS NFR SPEC: 40 %
CD22 CELLS NFR SPEC: 38 %
CD23 CELLS NFR SPEC: 9 %
CD3 CELLS NFR SPEC: 57 %
CD3+CD4+ CELLS NFR SPEC: 45 %
CD3+CD4+ CELLS/CD3+CD8+ CLL SPEC: 5
CD3+CD8+ CELLS NFR SPEC: 9 %
CD45 CELLS NFR SPEC: 100 %
CD5 CELLS NFR SPEC: 57 %
CD5/CD19 CELLS: <1 %
CD56 CELLS NFR SPEC: 3 %
CD7 CELLS NFR SPEC: 50 %
CELL SURF KAPPA/LAMBDA RATIO: 17
CELL SURF LAMBDA LIGHT CHAIN: 2 %
CELL SURFACE KAPPA LIGHT CHAIN: 34 %
FMC7 CELLS NFR SPEC: 20 %

## 2022-05-11 ENCOUNTER — TELEPHONE (OUTPATIENT)
Dept: HEMATOLOGY/ONCOLOGY | Age: 82
End: 2022-05-11

## 2022-05-19 ENCOUNTER — OFFICE VISIT (OUTPATIENT)
Dept: HEMATOLOGY/ONCOLOGY | Age: 82
End: 2022-05-19
Attending: INTERNAL MEDICINE

## 2022-05-19 ENCOUNTER — HOSPITAL ENCOUNTER (OUTPATIENT)
Dept: LAB | Age: 82
Discharge: STILL ADMITTED OR ADMINISTRATIVE | End: 2022-05-19
Attending: INTERNAL MEDICINE

## 2022-05-19 VITALS
HEIGHT: 64 IN | BODY MASS INDEX: 35.68 KG/M2 | SYSTOLIC BLOOD PRESSURE: 143 MMHG | DIASTOLIC BLOOD PRESSURE: 72 MMHG | TEMPERATURE: 98.1 F | HEART RATE: 78 BPM | OXYGEN SATURATION: 98 % | WEIGHT: 209 LBS

## 2022-05-19 DIAGNOSIS — C82.01 GRADE 1 FOLLICULAR LYMPHOMA OF LYMPH NODES OF NECK (CMD): Primary | ICD-10-CM

## 2022-05-19 DIAGNOSIS — C82.01 GRADE 1 FOLLICULAR LYMPHOMA OF LYMPH NODES OF NECK (CMD): ICD-10-CM

## 2022-05-19 LAB
ALBUMIN SERPL-MCNC: 4 G/DL (ref 3.6–5.1)
ALBUMIN/GLOB SERPL: 1.1 {RATIO} (ref 1–2.4)
ALP SERPL-CCNC: 70 UNITS/L (ref 45–117)
ALT SERPL-CCNC: 39 UNITS/L
ANION GAP SERPL CALC-SCNC: 8 MMOL/L (ref 7–19)
AST SERPL-CCNC: 41 UNITS/L
BASOPHILS # BLD: 0.1 K/MCL (ref 0–0.3)
BASOPHILS NFR BLD: 1 %
BILIRUB SERPL-MCNC: 0.4 MG/DL (ref 0.2–1)
BUN SERPL-MCNC: 16 MG/DL (ref 6–20)
BUN/CREAT SERPL: 24 (ref 7–25)
CALCIUM SERPL-MCNC: 9.6 MG/DL (ref 8.4–10.2)
CHLORIDE SERPL-SCNC: 106 MMOL/L (ref 97–110)
CO2 SERPL-SCNC: 28 MMOL/L (ref 21–32)
CREAT SERPL-MCNC: 0.67 MG/DL (ref 0.51–0.95)
DEPRECATED RDW RBC: 45 FL (ref 39–50)
EOSINOPHIL # BLD: 0.4 K/MCL (ref 0–0.5)
EOSINOPHIL NFR BLD: 6 %
ERYTHROCYTE [DISTWIDTH] IN BLOOD: 12.9 % (ref 11–15)
FASTING DURATION TIME PATIENT: ABNORMAL H
GFR SERPLBLD BASED ON 1.73 SQ M-ARVRAT: 88 ML/MIN
GLOBULIN SER-MCNC: 3.7 G/DL (ref 2–4)
GLUCOSE SERPL-MCNC: 106 MG/DL (ref 70–99)
HCT VFR BLD CALC: 44.7 % (ref 36–46.5)
HGB BLD-MCNC: 15 G/DL (ref 12–15.5)
IMM GRANULOCYTES # BLD AUTO: 0 K/MCL (ref 0–0.2)
IMM GRANULOCYTES # BLD: 0 %
LDH SERPL L TO P-CCNC: 256 UNITS/L (ref 82–240)
LYMPHOCYTES # BLD: 1.5 K/MCL (ref 1–4)
LYMPHOCYTES NFR BLD: 22 %
MCH RBC QN AUTO: 31.8 PG (ref 26–34)
MCHC RBC AUTO-ENTMCNC: 33.6 G/DL (ref 32–36.5)
MCV RBC AUTO: 94.7 FL (ref 78–100)
MONOCYTES # BLD: 0.4 K/MCL (ref 0.3–0.9)
MONOCYTES NFR BLD: 6 %
NEUTROPHILS # BLD: 4.4 K/MCL (ref 1.8–7.7)
NEUTROPHILS NFR BLD: 65 %
NRBC BLD MANUAL-RTO: 0 /100 WBC
PLATELET # BLD AUTO: 161 K/MCL (ref 140–450)
POTASSIUM SERPL-SCNC: 4.3 MMOL/L (ref 3.4–5.1)
PROT SERPL-MCNC: 7.7 G/DL (ref 6.4–8.2)
RBC # BLD: 4.72 MIL/MCL (ref 4–5.2)
SODIUM SERPL-SCNC: 138 MMOL/L (ref 135–145)
WBC # BLD: 6.8 K/MCL (ref 4.2–11)

## 2022-05-19 PROCEDURE — 99211 OFF/OP EST MAY X REQ PHY/QHP: CPT

## 2022-05-19 PROCEDURE — 85025 COMPLETE CBC W/AUTO DIFF WBC: CPT | Performed by: INTERNAL MEDICINE

## 2022-05-19 PROCEDURE — 80053 COMPREHEN METABOLIC PANEL: CPT | Performed by: INTERNAL MEDICINE

## 2022-05-19 PROCEDURE — 36415 COLL VENOUS BLD VENIPUNCTURE: CPT | Performed by: INTERNAL MEDICINE

## 2022-05-19 PROCEDURE — 83615 LACTATE (LD) (LDH) ENZYME: CPT | Performed by: INTERNAL MEDICINE

## 2022-05-19 PROCEDURE — 99214 OFFICE O/P EST MOD 30 MIN: CPT | Performed by: INTERNAL MEDICINE

## 2022-05-19 ASSESSMENT — ENCOUNTER SYMPTOMS
VOICE CHANGE: 0
TROUBLE SWALLOWING: 0
HEADACHES: 0
CHILLS: 0
SLEEP DISTURBANCE: 0
BACK PAIN: 0
BLOOD IN STOOL: 0
SHORTNESS OF BREATH: 0
UNEXPECTED WEIGHT CHANGE: 0
FEVER: 0
CONSTIPATION: 0
COUGH: 0
DIAPHORESIS: 0
NAUSEA: 0
DIARRHEA: 0
APNEA: 0
DIZZINESS: 0
WHEEZING: 0
VOMITING: 0
BRUISES/BLEEDS EASILY: 0
CHOKING: 0
WEAKNESS: 0
LIGHT-HEADEDNESS: 0
ADENOPATHY: 0
CONFUSION: 0
SPEECH DIFFICULTY: 0
ABDOMINAL DISTENTION: 0
FATIGUE: 0
ABDOMINAL PAIN: 0
ACTIVITY CHANGE: 0
APPETITE CHANGE: 0
CHEST TIGHTNESS: 0

## 2022-05-19 ASSESSMENT — PAIN SCALES - GENERAL: PAINLEVEL: 0

## 2022-06-15 ENCOUNTER — OFFICE VISIT (OUTPATIENT)
Dept: HEMATOLOGY/ONCOLOGY | Age: 82
End: 2022-06-15
Attending: INTERNAL MEDICINE

## 2022-06-15 DIAGNOSIS — C82.01 GRADE 1 FOLLICULAR LYMPHOMA OF LYMPH NODES OF NECK (CMD): Primary | ICD-10-CM

## 2022-06-15 PROCEDURE — 88305 TISSUE EXAM BY PATHOLOGIST: CPT | Performed by: INTERNAL MEDICINE

## 2022-06-15 PROCEDURE — 99211 OFF/OP EST MAY X REQ PHY/QHP: CPT

## 2022-06-15 PROCEDURE — 88184 FLOWCYTOMETRY/ TC 1 MARKER: CPT | Performed by: INTERNAL MEDICINE

## 2022-06-15 PROCEDURE — 88237 TISSUE CULTURE BONE MARROW: CPT | Performed by: INTERNAL MEDICINE

## 2022-06-15 PROCEDURE — 99214 OFFICE O/P EST MOD 30 MIN: CPT | Performed by: INTERNAL MEDICINE

## 2022-06-15 PROCEDURE — 38222 DX BONE MARROW BX & ASPIR: CPT | Performed by: INTERNAL MEDICINE

## 2022-06-22 ENCOUNTER — APPOINTMENT (OUTPATIENT)
Dept: HEMATOLOGY/ONCOLOGY | Age: 82
End: 2022-06-22
Attending: INTERNAL MEDICINE

## 2022-06-24 LAB
ASR DISCLAIMER: NORMAL
CASE RPRT: NORMAL
CASE RPRT: NORMAL
CELL GROWTH FIB QL TISS CULTURE: NORMAL
CLINICAL INFO: NORMAL
CLINICAL INFO: NORMAL
FLOW CYTOMETRY STUDY: NORMAL
KARYOTYP CVS: NORMAL
KARYOTYP CVS: NORMAL
Lab: NORMAL
PATH REPORT.FINAL DX SPEC: NORMAL
PATH REPORT.GROSS SPEC: NORMAL
PATH REPORT.MICROSCOPIC SPEC OTHER STN: NORMAL
SERVICE CMNT-IMP: NORMAL

## 2022-06-29 ENCOUNTER — OFFICE VISIT (OUTPATIENT)
Dept: HEMATOLOGY/ONCOLOGY | Age: 82
End: 2022-06-29
Attending: INTERNAL MEDICINE

## 2022-06-29 ENCOUNTER — TELEPHONE (OUTPATIENT)
Dept: FAMILY MEDICINE CLINIC | Facility: CLINIC | Age: 82
End: 2022-06-29

## 2022-06-29 VITALS
HEART RATE: 71 BPM | SYSTOLIC BLOOD PRESSURE: 148 MMHG | WEIGHT: 206.2 LBS | TEMPERATURE: 98.2 F | OXYGEN SATURATION: 95 % | HEIGHT: 64 IN | DIASTOLIC BLOOD PRESSURE: 80 MMHG | BODY MASS INDEX: 35.2 KG/M2

## 2022-06-29 DIAGNOSIS — E78.00 PURE HYPERCHOLESTEROLEMIA: ICD-10-CM

## 2022-06-29 DIAGNOSIS — C83.32 DIFFUSE LARGE B-CELL LYMPHOMA OF INTRATHORACIC LYMPH NODES (HCC): ICD-10-CM

## 2022-06-29 DIAGNOSIS — E55.9 VITAMIN D DEFICIENCY: Primary | ICD-10-CM

## 2022-06-29 DIAGNOSIS — C82.01 GRADE 1 FOLLICULAR LYMPHOMA OF LYMPH NODES OF NECK (CMD): Primary | ICD-10-CM

## 2022-06-29 PROCEDURE — 99211 OFF/OP EST MAY X REQ PHY/QHP: CPT

## 2022-06-29 PROCEDURE — 99214 OFFICE O/P EST MOD 30 MIN: CPT | Performed by: INTERNAL MEDICINE

## 2022-06-29 ASSESSMENT — ENCOUNTER SYMPTOMS
APPETITE CHANGE: 0
VOICE CHANGE: 0
UNEXPECTED WEIGHT CHANGE: 0
ABDOMINAL DISTENTION: 0
FATIGUE: 0
APNEA: 0
COUGH: 0
CHEST TIGHTNESS: 0
WHEEZING: 0
DIZZINESS: 0
BACK PAIN: 0
VOMITING: 0
CONSTIPATION: 0
NAUSEA: 0
CHILLS: 0
LIGHT-HEADEDNESS: 0
TROUBLE SWALLOWING: 0
BRUISES/BLEEDS EASILY: 0
FEVER: 0
ADENOPATHY: 0
HEADACHES: 0
SHORTNESS OF BREATH: 0
CONFUSION: 0
SLEEP DISTURBANCE: 0
WEAKNESS: 0
CHOKING: 0
DIAPHORESIS: 0
DIARRHEA: 0
ABDOMINAL PAIN: 0
BLOOD IN STOOL: 0
SPEECH DIFFICULTY: 0
ACTIVITY CHANGE: 0

## 2022-06-29 ASSESSMENT — PAIN SCALES - GENERAL: PAINLEVEL: 0

## 2022-06-29 NOTE — TELEPHONE ENCOUNTER
LOV 7/26/21 has up coming [address] 8/8/22 there is a refill at the pharmacy called and talked to patient and she will pick this up

## 2022-06-29 NOTE — TELEPHONE ENCOUNTER
Please enter lab orders for the patient's upcoming physical appointment. Physical scheduled: Your appointments     Date & Time Appointment Department [facility])    Aug 02, 2022 11:30 AM CDT Medicare Annual Well Visit with Jovi Omalley MD [facility] [address], [address], [place]  ([address] [place] [address])            [address] [place] [address] [phone]         Preferred lab: [facility] LAB [facility] CANCER CTR & RESEARCH INST)     The patient has been notified to complete fasting labs prior to their physical appointment.

## 2022-06-29 NOTE — TELEPHONE ENCOUNTER
Pt calling to request refill on Celecoxib. Only has enough for the remainder of this week.  Please send refill to [facility] [address] .     Pt has upcoming appt for [facility] on 8/8/22

## 2022-07-11 ENCOUNTER — OFFICE VISIT (OUTPATIENT)
Dept: PODIATRY CLINIC | Facility: CLINIC | Age: 82
End: 2022-07-11
Payer: MEDICARE

## 2022-07-11 DIAGNOSIS — B35.1 ONYCHOMYCOSIS: Primary | ICD-10-CM

## 2022-07-11 DIAGNOSIS — M79.675 PAIN IN TOE OF LEFT FOOT: ICD-10-CM

## 2022-07-11 DIAGNOSIS — M20.41 HAMMER TOE OF RIGHT FOOT: ICD-10-CM

## 2022-07-11 DIAGNOSIS — L84 HELOMA DURUM: ICD-10-CM

## 2022-07-11 DIAGNOSIS — M79.674 PAIN OF TOE OF RIGHT FOOT: ICD-10-CM

## 2022-07-11 DIAGNOSIS — H54.8 LEGAL BLINDNESS, AS DEFINED IN USA: ICD-10-CM

## 2022-07-11 PROCEDURE — 11721 DEBRIDE NAIL 6 OR MORE: CPT | Performed by: PODIATRIST

## 2022-07-11 PROCEDURE — 1126F AMNT PAIN NOTED NONE PRSNT: CPT | Performed by: PODIATRIST

## 2022-07-20 ENCOUNTER — OFFICE VISIT (OUTPATIENT)
Dept: NEUROLOGY | Facility: CLINIC | Age: 82
End: 2022-07-20
Payer: MEDICARE

## 2022-07-20 VITALS
HEART RATE: 84 BPM | SYSTOLIC BLOOD PRESSURE: 128 MMHG | RESPIRATION RATE: 16 BRPM | BODY MASS INDEX: 35 KG/M2 | DIASTOLIC BLOOD PRESSURE: 74 MMHG | WEIGHT: 210 LBS

## 2022-07-20 DIAGNOSIS — G25.0 BENIGN ESSENTIAL TREMOR: Primary | ICD-10-CM

## 2022-07-20 PROCEDURE — 99213 OFFICE O/P EST LOW 20 MIN: CPT | Performed by: OTHER

## 2022-07-20 RX ORDER — DICYCLOMINE HYDROCHLORIDE 10 MG/1
1 CAPSULE ORAL DAILY
COMMUNITY
Start: 2021-07-26

## 2022-07-28 ENCOUNTER — TELEPHONE (OUTPATIENT)
Dept: FAMILY MEDICINE CLINIC | Facility: CLINIC | Age: 82
End: 2022-07-28

## 2022-08-01 NOTE — TELEPHONE ENCOUNTER
Not on protocol pended order Pt called due to missed [employer] transmission. LM with wife to transmit.

## 2022-08-02 ENCOUNTER — OFFICE VISIT (OUTPATIENT)
Dept: FAMILY MEDICINE CLINIC | Facility: CLINIC | Age: 82
End: 2022-08-02
Payer: MEDICARE

## 2022-08-02 VITALS
RESPIRATION RATE: 16 BRPM | BODY MASS INDEX: 35.25 KG/M2 | OXYGEN SATURATION: 97 % | DIASTOLIC BLOOD PRESSURE: 66 MMHG | SYSTOLIC BLOOD PRESSURE: 120 MMHG | TEMPERATURE: 99 F | HEIGHT: 64.5 IN | WEIGHT: 209 LBS | HEART RATE: 77 BPM

## 2022-08-02 DIAGNOSIS — Z13.31 DEPRESSION SCREENING: ICD-10-CM

## 2022-08-02 DIAGNOSIS — I70.0 ABDOMINAL AORTIC ATHEROSCLEROSIS (HCC): ICD-10-CM

## 2022-08-02 DIAGNOSIS — Z80.3 FAMILY HISTORY OF BREAST CANCER IN MOTHER: ICD-10-CM

## 2022-08-02 DIAGNOSIS — Z78.0 POSTMENOPAUSAL: ICD-10-CM

## 2022-08-02 DIAGNOSIS — E66.01 SEVERE OBESITY WITH BODY MASS INDEX (BMI) OF 35.0 TO 39.9 WITH SERIOUS COMORBIDITY (HCC): ICD-10-CM

## 2022-08-02 DIAGNOSIS — M25.50 ARTHRALGIA, UNSPECIFIED JOINT: ICD-10-CM

## 2022-08-02 DIAGNOSIS — N60.19 FIBROCYSTIC BREAST DISEASE (FCBD), UNSPECIFIED LATERALITY: ICD-10-CM

## 2022-08-02 DIAGNOSIS — D32.9 MENINGIOMA (HCC): ICD-10-CM

## 2022-08-02 DIAGNOSIS — Z12.31 ENCOUNTER FOR SCREENING MAMMOGRAM FOR BREAST CANCER: ICD-10-CM

## 2022-08-02 DIAGNOSIS — R10.30 LOWER ABDOMINAL PAIN: ICD-10-CM

## 2022-08-02 DIAGNOSIS — C83.32 DIFFUSE LARGE B-CELL LYMPHOMA OF INTRATHORACIC LYMPH NODES (HCC): ICD-10-CM

## 2022-08-02 DIAGNOSIS — Z00.00 ENCOUNTER FOR ANNUAL HEALTH EXAMINATION: Primary | ICD-10-CM

## 2022-08-02 PROCEDURE — 1125F AMNT PAIN NOTED PAIN PRSNT: CPT | Performed by: FAMILY MEDICINE

## 2022-08-02 PROCEDURE — G0444 DEPRESSION SCREEN ANNUAL: HCPCS | Performed by: FAMILY MEDICINE

## 2022-08-02 PROCEDURE — G0439 PPPS, SUBSEQ VISIT: HCPCS | Performed by: FAMILY MEDICINE

## 2022-08-02 RX ORDER — CELECOXIB 200 MG/1
200 CAPSULE ORAL DAILY
Qty: 90 CAPSULE | Refills: 3 | Status: SHIPPED | OUTPATIENT
Start: 2022-08-02

## 2022-08-02 RX ORDER — RALOXIFENE HYDROCHLORIDE 60 MG/1
60 TABLET, FILM COATED ORAL
Qty: 90 TABLET | Refills: 3 | Status: SHIPPED | OUTPATIENT
Start: 2022-08-02

## 2022-08-02 RX ORDER — DICYCLOMINE HYDROCHLORIDE 10 MG/1
10 CAPSULE ORAL
Qty: 120 CAPSULE | Refills: 1 | Status: SHIPPED | OUTPATIENT
Start: 2022-08-02

## 2022-08-09 ENCOUNTER — HOSPITAL ENCOUNTER (OUTPATIENT)
Dept: BONE DENSITY | Age: 82
Discharge: HOME | End: 2022-08-09
Attending: FAMILY MEDICINE
Payer: MEDICARE

## 2022-08-09 ENCOUNTER — HOSPITAL ENCOUNTER (OUTPATIENT)
Dept: MAMMOGRAPHY | Age: 82
Discharge: HOME | End: 2022-08-09
Attending: FAMILY MEDICINE
Payer: MEDICARE

## 2022-08-09 DIAGNOSIS — Z78.0 POSTMENOPAUSAL: ICD-10-CM

## 2022-08-09 DIAGNOSIS — Z12.31 ENCOUNTER FOR SCREENING MAMMOGRAM FOR BREAST CANCER: ICD-10-CM

## 2022-08-09 LAB — HM DEXA SCAN: NORMAL

## 2022-08-09 PROCEDURE — 77080 DXA BONE DENSITY AXIAL: CPT | Performed by: FAMILY MEDICINE

## 2022-08-09 PROCEDURE — 77067 SCR MAMMO BI INCL CAD: CPT | Performed by: FAMILY MEDICINE

## 2022-08-09 PROCEDURE — 77063 BREAST TOMOSYNTHESIS BI: CPT | Performed by: FAMILY MEDICINE

## 2022-09-28 ENCOUNTER — APPOINTMENT (OUTPATIENT)
Dept: HEMATOLOGY/ONCOLOGY | Age: 82
End: 2022-09-28
Attending: INTERNAL MEDICINE

## 2022-09-28 ENCOUNTER — APPOINTMENT (OUTPATIENT)
Dept: LAB | Age: 82
End: 2022-09-28
Attending: INTERNAL MEDICINE

## 2022-09-29 ENCOUNTER — TELEPHONE (OUTPATIENT)
Dept: FAMILY MEDICINE CLINIC | Facility: CLINIC | Age: 82
End: 2022-09-29

## 2022-09-29 NOTE — TELEPHONE ENCOUNTER
Received PA for Celecoxib 200 mg Capsules from [facility] Emailed to Miah. Original placed in her bin. Pt has been notified of PA process. States she only has three days worth of medication. If not approved, she would like to have it sent to [facility] and she will use [facility].

## 2022-10-03 DIAGNOSIS — C82.01 GRADE 1 FOLLICULAR LYMPHOMA OF LYMPH NODES OF NECK (CMD): Primary | ICD-10-CM

## 2022-10-03 NOTE — TELEPHONE ENCOUNTER
Left message on home number for patient to call back. Celebrex was just prescribed to local pharmacy 8/2/22 #90 with 3 refills. Pt can pay out of pocket for a portion of this if she would like.

## 2022-10-03 NOTE — TELEPHONE ENCOUNTER
Pt states she has one dose left of medication. Can a partial be sent while prior Deidre Andrei is processed? Please advise.

## 2022-10-04 ENCOUNTER — OFFICE VISIT (OUTPATIENT)
Dept: HEMATOLOGY/ONCOLOGY | Age: 82
End: 2022-10-04
Attending: INTERNAL MEDICINE

## 2022-10-04 ENCOUNTER — HOSPITAL ENCOUNTER (OUTPATIENT)
Dept: LAB | Age: 82
Discharge: STILL ADMITTED OR ADMINISTRATIVE | End: 2022-10-04
Attending: INTERNAL MEDICINE

## 2022-10-04 VITALS
HEIGHT: 64 IN | TEMPERATURE: 98 F | BODY MASS INDEX: 36.37 KG/M2 | DIASTOLIC BLOOD PRESSURE: 73 MMHG | OXYGEN SATURATION: 95 % | WEIGHT: 213 LBS | SYSTOLIC BLOOD PRESSURE: 144 MMHG | HEART RATE: 70 BPM

## 2022-10-04 DIAGNOSIS — C82.01 GRADE 1 FOLLICULAR LYMPHOMA OF LYMPH NODES OF NECK (CMD): Primary | ICD-10-CM

## 2022-10-04 DIAGNOSIS — C82.01 GRADE 1 FOLLICULAR LYMPHOMA OF LYMPH NODES OF NECK (CMD): ICD-10-CM

## 2022-10-04 LAB
ALBUMIN SERPL-MCNC: 3.6 G/DL (ref 3.6–5.1)
ALBUMIN/GLOB SERPL: 1.1 {RATIO} (ref 1–2.4)
ALP SERPL-CCNC: 55 UNITS/L (ref 45–117)
ALT SERPL-CCNC: 34 UNITS/L
ANION GAP SERPL CALC-SCNC: 7 MMOL/L (ref 7–19)
AST SERPL-CCNC: 25 UNITS/L
BASOPHILS # BLD: 0.1 K/MCL (ref 0–0.3)
BASOPHILS NFR BLD: 1 %
BILIRUB SERPL-MCNC: 0.3 MG/DL (ref 0.2–1)
BUN SERPL-MCNC: 18 MG/DL (ref 6–20)
BUN/CREAT SERPL: 25 (ref 7–25)
CALCIUM SERPL-MCNC: 9.1 MG/DL (ref 8.4–10.2)
CHLORIDE SERPL-SCNC: 104 MMOL/L (ref 97–110)
CO2 SERPL-SCNC: 29 MMOL/L (ref 21–32)
CREAT SERPL-MCNC: 0.72 MG/DL (ref 0.51–0.95)
DEPRECATED RDW RBC: 45.8 FL (ref 39–50)
EOSINOPHIL # BLD: 0.3 K/MCL (ref 0–0.5)
EOSINOPHIL NFR BLD: 5 %
ERYTHROCYTE [DISTWIDTH] IN BLOOD: 13.2 % (ref 11–15)
FASTING DURATION TIME PATIENT: NORMAL H
GFR SERPLBLD BASED ON 1.73 SQ M-ARVRAT: 84 ML/MIN
GLOBULIN SER-MCNC: 3.4 G/DL (ref 2–4)
GLUCOSE SERPL-MCNC: 91 MG/DL (ref 70–99)
HCT VFR BLD CALC: 41.2 % (ref 36–46.5)
HGB BLD-MCNC: 13.5 G/DL (ref 12–15.5)
IMM GRANULOCYTES # BLD AUTO: 0 K/MCL (ref 0–0.2)
IMM GRANULOCYTES # BLD: 0 %
LDH SERPL L TO P-CCNC: 198 UNITS/L (ref 82–240)
LYMPHOCYTES # BLD: 1.5 K/MCL (ref 1–4)
LYMPHOCYTES NFR BLD: 24 %
MCH RBC QN AUTO: 31.1 PG (ref 26–34)
MCHC RBC AUTO-ENTMCNC: 32.8 G/DL (ref 32–36.5)
MCV RBC AUTO: 94.9 FL (ref 78–100)
MONOCYTES # BLD: 0.4 K/MCL (ref 0.3–0.9)
MONOCYTES NFR BLD: 7 %
NEUTROPHILS # BLD: 3.9 K/MCL (ref 1.8–7.7)
NEUTROPHILS NFR BLD: 63 %
NRBC BLD MANUAL-RTO: 0 /100 WBC
PLATELET # BLD AUTO: 168 K/MCL (ref 140–450)
POTASSIUM SERPL-SCNC: 4.4 MMOL/L (ref 3.4–5.1)
PROT SERPL-MCNC: 7 G/DL (ref 6.4–8.2)
RAINBOW EXTRA TUBES HOLD SPECIMEN: NORMAL
RBC # BLD: 4.34 MIL/MCL (ref 4–5.2)
SODIUM SERPL-SCNC: 136 MMOL/L (ref 135–145)
WBC # BLD: 6.2 K/MCL (ref 4.2–11)

## 2022-10-04 PROCEDURE — 99214 OFFICE O/P EST MOD 30 MIN: CPT | Performed by: INTERNAL MEDICINE

## 2022-10-04 PROCEDURE — 99211 OFF/OP EST MAY X REQ PHY/QHP: CPT

## 2022-10-04 PROCEDURE — 80053 COMPREHEN METABOLIC PANEL: CPT | Performed by: INTERNAL MEDICINE

## 2022-10-04 PROCEDURE — 36415 COLL VENOUS BLD VENIPUNCTURE: CPT | Performed by: INTERNAL MEDICINE

## 2022-10-04 PROCEDURE — 85025 COMPLETE CBC W/AUTO DIFF WBC: CPT | Performed by: INTERNAL MEDICINE

## 2022-10-04 PROCEDURE — 83615 LACTATE (LD) (LDH) ENZYME: CPT | Performed by: INTERNAL MEDICINE

## 2022-10-04 ASSESSMENT — ENCOUNTER SYMPTOMS
ABDOMINAL DISTENTION: 0
VOICE CHANGE: 0
APNEA: 0
APPETITE CHANGE: 0
LIGHT-HEADEDNESS: 0
TROUBLE SWALLOWING: 0
CHOKING: 0
VOMITING: 0
WEAKNESS: 0
DIZZINESS: 0
HEADACHES: 0
CHEST TIGHTNESS: 0
SPEECH DIFFICULTY: 0
ABDOMINAL PAIN: 0
DIAPHORESIS: 0
FEVER: 0
SHORTNESS OF BREATH: 0
DIARRHEA: 0
BACK PAIN: 0
BRUISES/BLEEDS EASILY: 0
CONFUSION: 0
ADENOPATHY: 0
CHILLS: 0
NAUSEA: 0
FATIGUE: 0
SLEEP DISTURBANCE: 0
UNEXPECTED WEIGHT CHANGE: 0
WHEEZING: 0
BLOOD IN STOOL: 0
COUGH: 0
CONSTIPATION: 0
ACTIVITY CHANGE: 0

## 2022-10-04 ASSESSMENT — PATIENT HEALTH QUESTIONNAIRE - PHQ9
2. FEELING DOWN, DEPRESSED OR HOPELESS: NOT AT ALL
SUM OF ALL RESPONSES TO PHQ9 QUESTIONS 1 AND 2: 0
SUM OF ALL RESPONSES TO PHQ9 QUESTIONS 1 AND 2: 0
1. LITTLE INTEREST OR PLEASURE IN DOING THINGS: NOT AT ALL
CLINICAL INTERPRETATION OF PHQ2 SCORE: NO FURTHER SCREENING NEEDED

## 2022-10-04 ASSESSMENT — PAIN SCALES - GENERAL: PAINLEVEL: 0

## 2022-10-10 ENCOUNTER — OFFICE VISIT (OUTPATIENT)
Dept: PODIATRY CLINIC | Facility: CLINIC | Age: 82
End: 2022-10-10
Payer: MEDICARE

## 2022-10-10 DIAGNOSIS — M79.675 PAIN IN TOE OF LEFT FOOT: ICD-10-CM

## 2022-10-10 DIAGNOSIS — H54.8 LEGAL BLINDNESS, AS DEFINED IN USA: ICD-10-CM

## 2022-10-10 DIAGNOSIS — M79.674 PAIN OF TOE OF RIGHT FOOT: Primary | ICD-10-CM

## 2022-10-10 DIAGNOSIS — M20.41 HAMMER TOE OF RIGHT FOOT: ICD-10-CM

## 2022-10-10 DIAGNOSIS — L84 HELOMA DURUM: ICD-10-CM

## 2022-10-10 DIAGNOSIS — B35.1 ONYCHOMYCOSIS: ICD-10-CM

## 2022-10-12 ENCOUNTER — HOSPITAL ENCOUNTER (OUTPATIENT)
Dept: NUCLEAR MEDICINE | Facility: HOSPITAL | Age: 82
Discharge: HOME | End: 2022-10-12
Attending: INTERNAL MEDICINE
Payer: MEDICARE

## 2022-10-12 DIAGNOSIS — C82.01 GRADE 1 FOLLICULAR LYMPHOMA OF LYMPH NODES OF NECK (HCC): ICD-10-CM

## 2022-10-12 LAB — GLUCOSE BLD-MCNC: 109 MG/DL (ref 70–99)

## 2022-10-12 PROCEDURE — 82962 GLUCOSE BLOOD TEST: CPT

## 2022-10-12 PROCEDURE — 78815 PET IMAGE W/CT SKULL-THIGH: CPT | Performed by: INTERNAL MEDICINE

## 2022-10-20 ENCOUNTER — TELEPHONE (OUTPATIENT)
Dept: HEMATOLOGY/ONCOLOGY | Age: 82
End: 2022-10-20

## 2022-12-07 ENCOUNTER — HOSPITAL ENCOUNTER (OUTPATIENT)
Age: 82
Discharge: HOME | End: 2022-12-07
Payer: MEDICARE

## 2022-12-07 ENCOUNTER — TELEPHONE (OUTPATIENT)
Dept: FAMILY MEDICINE CLINIC | Facility: CLINIC | Age: 82
End: 2022-12-07

## 2022-12-07 VITALS
HEART RATE: 83 BPM | RESPIRATION RATE: 20 BRPM | SYSTOLIC BLOOD PRESSURE: 150 MMHG | OXYGEN SATURATION: 98 % | DIASTOLIC BLOOD PRESSURE: 99 MMHG | TEMPERATURE: 99 F

## 2022-12-07 DIAGNOSIS — B02.9 HERPES ZOSTER WITHOUT COMPLICATION: Primary | ICD-10-CM

## 2022-12-07 PROCEDURE — 99214 OFFICE O/P EST MOD 30 MIN: CPT | Performed by: NURSE PRACTITIONER

## 2022-12-07 RX ORDER — HYDROCODONE BITARTRATE AND ACETAMINOPHEN 5; 325 MG/1; MG/1
1 TABLET ORAL EVERY 6 HOURS PRN
Qty: 12 TABLET | Refills: 0 | Status: SHIPPED | OUTPATIENT
Start: 2022-12-07 | End: 2022-12-12

## 2022-12-07 RX ORDER — VALACYCLOVIR HYDROCHLORIDE 1 G/1
1000 TABLET, FILM COATED ORAL 3 TIMES DAILY
Qty: 21 TABLET | Refills: 0 | Status: SHIPPED | OUTPATIENT
Start: 2022-12-07 | End: 2022-12-14

## 2022-12-07 NOTE — ED INITIAL ASSESSMENT (HPI)
Pt c/o left sided rib pain. Pt states the pain started yesterday. Pt has a red cluster rash to her left rib area.

## 2022-12-07 NOTE — DISCHARGE INSTRUCTIONS
Take medications as directed. If you take the Norco take it with a stool softener such as Colace and do not drive for 6 to 8 hours after taking it. Follow-up with your primary care doctor's office tomorrow as scheduled.

## 2022-12-07 NOTE — TELEPHONE ENCOUNTER
Pt c/o of left side pain under rib area radiating to her back. Offered appt for tomorrow with Dr. Delores Madsen. Pt would still like to speak to clinical. Pain keeps getting worse and worse as day goes by.

## 2022-12-07 NOTE — TELEPHONE ENCOUNTER
Called patient she has pain to left ribcage and radiates to her back this kept her up all night and today she was unable to do anything because of the pain. She has made an appointment for tomorrow but I suggested seh go to the urgent care to be seen tonight to find out what is wrong and possibly get some pain relief.

## 2022-12-08 ENCOUNTER — OFFICE VISIT (OUTPATIENT)
Dept: FAMILY MEDICINE CLINIC | Facility: CLINIC | Age: 82
End: 2022-12-08
Payer: MEDICARE

## 2022-12-08 VITALS
DIASTOLIC BLOOD PRESSURE: 80 MMHG | RESPIRATION RATE: 14 BRPM | BODY MASS INDEX: 35.59 KG/M2 | SYSTOLIC BLOOD PRESSURE: 140 MMHG | WEIGHT: 211 LBS | OXYGEN SATURATION: 96 % | HEIGHT: 64.5 IN | TEMPERATURE: 98 F | HEART RATE: 70 BPM

## 2022-12-08 DIAGNOSIS — B02.9 HERPES ZOSTER WITHOUT COMPLICATION: Primary | ICD-10-CM

## 2022-12-08 PROCEDURE — 99213 OFFICE O/P EST LOW 20 MIN: CPT | Performed by: FAMILY MEDICINE

## 2022-12-08 RX ORDER — PREDNISONE 20 MG/1
40 TABLET ORAL DAILY
Qty: 10 TABLET | Refills: 0 | Status: SHIPPED | OUTPATIENT
Start: 2022-12-08 | End: 2022-12-13

## 2022-12-14 ENCOUNTER — TELEPHONE (OUTPATIENT)
Dept: FAMILY MEDICINE CLINIC | Facility: CLINIC | Age: 82
End: 2022-12-14

## 2022-12-14 DIAGNOSIS — B02.9 HERPES ZOSTER WITHOUT COMPLICATION: Primary | ICD-10-CM

## 2022-12-14 NOTE — TELEPHONE ENCOUNTER
Pt calling to request refill on HYDROcodone-acetaminophen 5-325 MG Oral Tab. Took last dose this morning. This medication is working well for pain (shingles).  Please send refill to [facility] on [address].

## 2022-12-14 NOTE — TELEPHONE ENCOUNTER
LOV 12/8/22 with Dr. Lluvia Villarreal for shingles. \"Herpes zoster without complication  Long discussion regarding typical course, medication, infectious precautions, etc.  We will give burst of prednisone to help with nerve pain. Continue Valtrex and Norco as needed. May consider addition of gabapentin if symptoms continue to be severe. -     predniSONE 20 MG Oral Tab; Take 2 tablets (40 mg total) by mouth daily for 5 days. \"    Requesting [facility] refill. Last ordered by . HYDROcodone-acetaminophen 5-325 MG Oral Tab 12 tablet 0 12/7/2022 12/12/2022    Sig - Route:  Take 1 tablet by mouth every 6 (six) hours as needed for Pain. - Oral

## 2022-12-15 RX ORDER — HYDROCODONE BITARTRATE AND ACETAMINOPHEN 5; 325 MG/1; MG/1
1 TABLET ORAL EVERY 6 HOURS PRN
Qty: 12 TABLET | Refills: 0 | Status: SHIPPED | OUTPATIENT
Start: 2022-12-15

## 2022-12-15 NOTE — TELEPHONE ENCOUNTER
Will refill x1. Will need to change pain medications after this if persisting.    Claudean Joiner, DO

## 2022-12-30 DIAGNOSIS — C82.01 GRADE 1 FOLLICULAR LYMPHOMA OF LYMPH NODES OF NECK (CMD): Primary | ICD-10-CM

## 2023-01-03 ENCOUNTER — APPOINTMENT (OUTPATIENT)
Dept: HEMATOLOGY/ONCOLOGY | Age: 83
End: 2023-01-03
Attending: INTERNAL MEDICINE

## 2023-01-03 ENCOUNTER — TELEPHONE (OUTPATIENT)
Dept: HEMATOLOGY/ONCOLOGY | Age: 83
End: 2023-01-03

## 2023-01-03 ENCOUNTER — APPOINTMENT (OUTPATIENT)
Dept: LAB | Age: 83
End: 2023-01-03
Attending: INTERNAL MEDICINE

## 2023-01-10 ENCOUNTER — TELEPHONE (OUTPATIENT)
Dept: FAMILY MEDICINE CLINIC | Facility: CLINIC | Age: 83
End: 2023-01-10

## 2023-01-10 ENCOUNTER — OFFICE VISIT (OUTPATIENT)
Dept: PODIATRY CLINIC | Facility: CLINIC | Age: 83
End: 2023-01-10
Payer: MEDICARE

## 2023-01-10 DIAGNOSIS — B35.1 ONYCHOMYCOSIS: ICD-10-CM

## 2023-01-10 DIAGNOSIS — M79.675 PAIN IN TOE OF LEFT FOOT: ICD-10-CM

## 2023-01-10 DIAGNOSIS — M79.674 PAIN OF TOE OF RIGHT FOOT: Primary | ICD-10-CM

## 2023-01-10 DIAGNOSIS — M20.41 HAMMER TOE OF RIGHT FOOT: ICD-10-CM

## 2023-01-10 DIAGNOSIS — L84 HELOMA DURUM: ICD-10-CM

## 2023-01-10 PROCEDURE — 99213 OFFICE O/P EST LOW 20 MIN: CPT | Performed by: PODIATRIST

## 2023-01-10 NOTE — TELEPHONE ENCOUNTER
Pt requesting to speak to a nurse to discuss when can she stop taking gabapentin 100 MG Oral Cap that was prescribed by Dr. Leon Corral.

## 2023-01-10 NOTE — TELEPHONE ENCOUNTER
If pain resolved, then can do a trial off the medication. If taking 100mg TID or less, then no weaning needed. IF taking more than this, then decrease by 100mg every week until weaned.     Judy Cowan, DO

## 2023-01-10 NOTE — TELEPHONE ENCOUNTER
Called and talked to milad she says the rash is almost gone and she does not have any pain so she will stop the medication and if something changes she will call back

## 2023-01-16 ENCOUNTER — TELEPHONE (OUTPATIENT)
Dept: HEMATOLOGY/ONCOLOGY | Age: 83
End: 2023-01-16

## 2023-01-16 ENCOUNTER — TELEPHONE (OUTPATIENT)
Dept: INFUSION THERAPY | Age: 83
End: 2023-01-16

## 2023-01-16 ENCOUNTER — OFFICE VISIT (OUTPATIENT)
Dept: HEMATOLOGY/ONCOLOGY | Age: 83
End: 2023-01-16
Attending: INTERNAL MEDICINE

## 2023-01-16 ENCOUNTER — HOSPITAL ENCOUNTER (OUTPATIENT)
Dept: LAB | Age: 83
Discharge: STILL ADMITTED OR ADMINISTRATIVE | End: 2023-01-16
Attending: INTERNAL MEDICINE

## 2023-01-16 VITALS
HEIGHT: 64 IN | DIASTOLIC BLOOD PRESSURE: 68 MMHG | WEIGHT: 211 LBS | BODY MASS INDEX: 36.02 KG/M2 | OXYGEN SATURATION: 95 % | SYSTOLIC BLOOD PRESSURE: 132 MMHG | HEART RATE: 92 BPM | TEMPERATURE: 98.3 F

## 2023-01-16 DIAGNOSIS — C82.01 GRADE 1 FOLLICULAR LYMPHOMA OF LYMPH NODES OF NECK (CMD): Primary | ICD-10-CM

## 2023-01-16 DIAGNOSIS — C82.01 GRADE 1 FOLLICULAR LYMPHOMA OF LYMPH NODES OF NECK (CMD): ICD-10-CM

## 2023-01-16 LAB
ALBUMIN SERPL-MCNC: 3.8 G/DL (ref 3.6–5.1)
ALBUMIN/GLOB SERPL: 1.1 {RATIO} (ref 1–2.4)
ALP SERPL-CCNC: 56 UNITS/L (ref 45–117)
ALT SERPL-CCNC: 35 UNITS/L
ANION GAP SERPL CALC-SCNC: 9 MMOL/L (ref 7–19)
AST SERPL-CCNC: 29 UNITS/L
BASOPHILS # BLD: 0.1 K/MCL (ref 0–0.3)
BASOPHILS NFR BLD: 1 %
BILIRUB SERPL-MCNC: 0.3 MG/DL (ref 0.2–1)
BUN SERPL-MCNC: 14 MG/DL (ref 6–20)
BUN/CREAT SERPL: 17 (ref 7–25)
CALCIUM SERPL-MCNC: 9.1 MG/DL (ref 8.4–10.2)
CHLORIDE SERPL-SCNC: 104 MMOL/L (ref 97–110)
CO2 SERPL-SCNC: 28 MMOL/L (ref 21–32)
CREAT SERPL-MCNC: 0.82 MG/DL (ref 0.51–0.95)
DEPRECATED RDW RBC: 45.5 FL (ref 39–50)
EOSINOPHIL # BLD: 0.3 K/MCL (ref 0–0.5)
EOSINOPHIL NFR BLD: 4 %
ERYTHROCYTE [DISTWIDTH] IN BLOOD: 13.3 % (ref 11–15)
FASTING DURATION TIME PATIENT: ABNORMAL H
GFR SERPLBLD BASED ON 1.73 SQ M-ARVRAT: 72 ML/MIN
GLOBULIN SER-MCNC: 3.4 G/DL (ref 2–4)
GLUCOSE SERPL-MCNC: 112 MG/DL (ref 70–99)
HCT VFR BLD CALC: 40.9 % (ref 36–46.5)
HGB BLD-MCNC: 13.9 G/DL (ref 12–15.5)
IMM GRANULOCYTES # BLD AUTO: 0 K/MCL (ref 0–0.2)
IMM GRANULOCYTES # BLD: 0 %
LDH SERPL L TO P-CCNC: 247 UNITS/L (ref 82–240)
LYMPHOCYTES # BLD: 1.6 K/MCL (ref 1–4)
LYMPHOCYTES NFR BLD: 25 %
MCH RBC QN AUTO: 31.4 PG (ref 26–34)
MCHC RBC AUTO-ENTMCNC: 34 G/DL (ref 32–36.5)
MCV RBC AUTO: 92.3 FL (ref 78–100)
MONOCYTES # BLD: 0.3 K/MCL (ref 0.3–0.9)
MONOCYTES NFR BLD: 5 %
NEUTROPHILS # BLD: 4.2 K/MCL (ref 1.8–7.7)
NEUTROPHILS NFR BLD: 65 %
NRBC BLD MANUAL-RTO: 0 /100 WBC
PLATELET # BLD AUTO: 178 K/MCL (ref 140–450)
POTASSIUM SERPL-SCNC: 4 MMOL/L (ref 3.4–5.1)
PROT SERPL-MCNC: 7.2 G/DL (ref 6.4–8.2)
RAINBOW EXTRA TUBES HOLD SPECIMEN: NORMAL
RBC # BLD: 4.43 MIL/MCL (ref 4–5.2)
SODIUM SERPL-SCNC: 137 MMOL/L (ref 135–145)
WBC # BLD: 6.5 K/MCL (ref 4.2–11)

## 2023-01-16 PROCEDURE — 85025 COMPLETE CBC W/AUTO DIFF WBC: CPT | Performed by: INTERNAL MEDICINE

## 2023-01-16 PROCEDURE — 99211 OFF/OP EST MAY X REQ PHY/QHP: CPT

## 2023-01-16 PROCEDURE — 36415 COLL VENOUS BLD VENIPUNCTURE: CPT | Performed by: INTERNAL MEDICINE

## 2023-01-16 PROCEDURE — 83615 LACTATE (LD) (LDH) ENZYME: CPT | Performed by: INTERNAL MEDICINE

## 2023-01-16 PROCEDURE — 99215 OFFICE O/P EST HI 40 MIN: CPT | Performed by: INTERNAL MEDICINE

## 2023-01-16 PROCEDURE — 80053 COMPREHEN METABOLIC PANEL: CPT | Performed by: INTERNAL MEDICINE

## 2023-01-16 ASSESSMENT — ENCOUNTER SYMPTOMS
HEADACHES: 0
SHORTNESS OF BREATH: 0
CONFUSION: 0
FEVER: 0
CHOKING: 0
DIAPHORESIS: 0
ADENOPATHY: 0
DIZZINESS: 0
BACK PAIN: 0
ABDOMINAL DISTENTION: 0
APPETITE CHANGE: 0
ABDOMINAL PAIN: 0
ACTIVITY CHANGE: 0
LIGHT-HEADEDNESS: 0
CHEST TIGHTNESS: 0
VOICE CHANGE: 0
SLEEP DISTURBANCE: 0
UNEXPECTED WEIGHT CHANGE: 0
BRUISES/BLEEDS EASILY: 0
COUGH: 0
WHEEZING: 0
VOMITING: 0
FATIGUE: 0
WEAKNESS: 0
DIARRHEA: 0
APNEA: 0
SPEECH DIFFICULTY: 0
TROUBLE SWALLOWING: 0
CHILLS: 0
BLOOD IN STOOL: 0
NAUSEA: 0
CONSTIPATION: 0

## 2023-01-16 ASSESSMENT — PAIN SCALES - GENERAL: PAINLEVEL: 0

## 2023-01-16 ASSESSMENT — PATIENT HEALTH QUESTIONNAIRE - PHQ9
SUM OF ALL RESPONSES TO PHQ9 QUESTIONS 1 AND 2: 0
CLINICAL INTERPRETATION OF PHQ2 SCORE: NO FURTHER SCREENING NEEDED
1. LITTLE INTEREST OR PLEASURE IN DOING THINGS: NOT AT ALL
2. FEELING DOWN, DEPRESSED OR HOPELESS: NOT AT ALL
SUM OF ALL RESPONSES TO PHQ9 QUESTIONS 1 AND 2: 0

## 2023-01-25 DIAGNOSIS — G25.0 BENIGN ESSENTIAL TREMOR: ICD-10-CM

## 2023-01-25 RX ORDER — PRIMIDONE 50 MG/1
TABLET ORAL
Qty: 210 TABLET | Refills: 3 | Status: SHIPPED | OUTPATIENT
Start: 2023-01-25

## 2023-02-17 ENCOUNTER — APPOINTMENT (OUTPATIENT)
Dept: HEMATOLOGY/ONCOLOGY | Age: 83
End: 2023-02-17
Attending: INTERNAL MEDICINE

## 2023-02-17 ENCOUNTER — APPOINTMENT (OUTPATIENT)
Dept: LAB | Age: 83
End: 2023-02-17
Attending: INTERNAL MEDICINE

## 2023-04-10 ENCOUNTER — HOSPITAL ENCOUNTER (OUTPATIENT)
Dept: NUCLEAR MEDICINE | Facility: HOSPITAL | Age: 83
Discharge: HOME | End: 2023-04-10
Attending: INTERNAL MEDICINE
Payer: MEDICARE

## 2023-04-10 ENCOUNTER — EXTERNAL RECORD (OUTPATIENT)
Dept: OTHER | Age: 83
End: 2023-04-10

## 2023-04-10 DIAGNOSIS — C82.01 GRADE 1 FOLLICULAR LYMPHOMA OF LYMPH NODES OF NECK (HCC): ICD-10-CM

## 2023-04-10 LAB — GLUCOSE BLD-MCNC: 107 MG/DL (ref 70–99)

## 2023-04-10 PROCEDURE — 82962 GLUCOSE BLOOD TEST: CPT

## 2023-04-10 PROCEDURE — 78815 PET IMAGE W/CT SKULL-THIGH: CPT | Performed by: INTERNAL MEDICINE

## 2023-04-14 ENCOUNTER — TELEPHONE (OUTPATIENT)
Dept: HEMATOLOGY/ONCOLOGY | Age: 83
End: 2023-04-14

## 2023-04-18 ENCOUNTER — OFFICE VISIT (OUTPATIENT)
Dept: PODIATRY CLINIC | Facility: CLINIC | Age: 83
End: 2023-04-18

## 2023-04-18 DIAGNOSIS — M79.674 PAIN OF TOE OF RIGHT FOOT: Primary | ICD-10-CM

## 2023-04-18 DIAGNOSIS — M79.675 PAIN IN TOE OF LEFT FOOT: ICD-10-CM

## 2023-04-18 DIAGNOSIS — M20.41 HAMMER TOE OF RIGHT FOOT: ICD-10-CM

## 2023-04-18 DIAGNOSIS — B35.1 ONYCHOMYCOSIS: ICD-10-CM

## 2023-04-18 DIAGNOSIS — H54.8 LEGAL BLINDNESS, AS DEFINED IN USA: ICD-10-CM

## 2023-04-18 DIAGNOSIS — L84 HELOMA DURUM: ICD-10-CM

## 2023-04-18 PROCEDURE — 99213 OFFICE O/P EST LOW 20 MIN: CPT | Performed by: PODIATRIST

## 2023-05-05 DIAGNOSIS — C82.01 GRADE 1 FOLLICULAR LYMPHOMA OF LYMPH NODES OF NECK (CMD): Primary | ICD-10-CM

## 2023-05-08 ENCOUNTER — OFFICE VISIT (OUTPATIENT)
Dept: HEMATOLOGY/ONCOLOGY | Age: 83
End: 2023-05-08
Attending: INTERNAL MEDICINE

## 2023-05-08 ENCOUNTER — HOSPITAL ENCOUNTER (OUTPATIENT)
Dept: LAB | Age: 83
Discharge: STILL ADMITTED OR ADMINISTRATIVE | End: 2023-05-08
Attending: INTERNAL MEDICINE

## 2023-05-08 VITALS
DIASTOLIC BLOOD PRESSURE: 74 MMHG | HEART RATE: 74 BPM | TEMPERATURE: 98.2 F | OXYGEN SATURATION: 95 % | BODY MASS INDEX: 36.02 KG/M2 | SYSTOLIC BLOOD PRESSURE: 144 MMHG | WEIGHT: 211 LBS | HEIGHT: 64 IN

## 2023-05-08 DIAGNOSIS — C82.01 GRADE 1 FOLLICULAR LYMPHOMA OF LYMPH NODES OF NECK (CMD): Primary | ICD-10-CM

## 2023-05-08 DIAGNOSIS — R33.9 INCOMPLETE BLADDER EMPTYING: ICD-10-CM

## 2023-05-08 DIAGNOSIS — C82.01 GRADE 1 FOLLICULAR LYMPHOMA OF LYMPH NODES OF NECK (CMD): ICD-10-CM

## 2023-05-08 LAB
ALBUMIN SERPL-MCNC: 3.9 G/DL (ref 3.6–5.1)
ALBUMIN/GLOB SERPL: 1.1 {RATIO} (ref 1–2.4)
ALP SERPL-CCNC: 62 UNITS/L (ref 45–117)
ALT SERPL-CCNC: 31 UNITS/L
ANION GAP SERPL CALC-SCNC: 6 MMOL/L (ref 7–19)
AST SERPL-CCNC: 29 UNITS/L
BASOPHILS # BLD: 0.1 K/MCL (ref 0–0.3)
BASOPHILS NFR BLD: 1 %
BILIRUB SERPL-MCNC: 0.3 MG/DL (ref 0.2–1)
BUN SERPL-MCNC: 17 MG/DL (ref 6–20)
BUN/CREAT SERPL: 22 (ref 7–25)
CALCIUM SERPL-MCNC: 9.3 MG/DL (ref 8.4–10.2)
CHLORIDE SERPL-SCNC: 106 MMOL/L (ref 97–110)
CO2 SERPL-SCNC: 30 MMOL/L (ref 21–32)
CREAT SERPL-MCNC: 0.79 MG/DL (ref 0.51–0.95)
DEPRECATED RDW RBC: 45.4 FL (ref 39–50)
EOSINOPHIL # BLD: 0.3 K/MCL (ref 0–0.5)
EOSINOPHIL NFR BLD: 4 %
ERYTHROCYTE [DISTWIDTH] IN BLOOD: 12.9 % (ref 11–15)
FASTING DURATION TIME PATIENT: ABNORMAL H
GFR SERPLBLD BASED ON 1.73 SQ M-ARVRAT: 75 ML/MIN
GLOBULIN SER-MCNC: 3.5 G/DL (ref 2–4)
GLUCOSE SERPL-MCNC: 92 MG/DL (ref 70–99)
HCT VFR BLD CALC: 43.1 % (ref 36–46.5)
HGB BLD-MCNC: 13.9 G/DL (ref 12–15.5)
IMM GRANULOCYTES # BLD AUTO: 0 K/MCL (ref 0–0.2)
IMM GRANULOCYTES # BLD: 0 %
LDH SERPL L TO P-CCNC: 202 UNITS/L (ref 82–240)
LYMPHOCYTES # BLD: 1.4 K/MCL (ref 1–4)
LYMPHOCYTES NFR BLD: 22 %
MCH RBC QN AUTO: 30.8 PG (ref 26–34)
MCHC RBC AUTO-ENTMCNC: 32.3 G/DL (ref 32–36.5)
MCV RBC AUTO: 95.4 FL (ref 78–100)
MONOCYTES # BLD: 0.5 K/MCL (ref 0.3–0.9)
MONOCYTES NFR BLD: 7 %
NEUTROPHILS # BLD: 4.2 K/MCL (ref 1.8–7.7)
NEUTROPHILS NFR BLD: 66 %
NRBC BLD MANUAL-RTO: 0 /100 WBC
PLATELET # BLD AUTO: 157 K/MCL (ref 140–450)
POTASSIUM SERPL-SCNC: 4.4 MMOL/L (ref 3.4–5.1)
PROT SERPL-MCNC: 7.4 G/DL (ref 6.4–8.2)
RAINBOW EXTRA TUBES HOLD SPECIMEN: NORMAL
RBC # BLD: 4.52 MIL/MCL (ref 4–5.2)
SODIUM SERPL-SCNC: 138 MMOL/L (ref 135–145)
WBC # BLD: 6.4 K/MCL (ref 4.2–11)

## 2023-05-08 PROCEDURE — 85025 COMPLETE CBC W/AUTO DIFF WBC: CPT | Performed by: INTERNAL MEDICINE

## 2023-05-08 PROCEDURE — 99211 OFF/OP EST MAY X REQ PHY/QHP: CPT

## 2023-05-08 PROCEDURE — 99215 OFFICE O/P EST HI 40 MIN: CPT | Performed by: INTERNAL MEDICINE

## 2023-05-08 PROCEDURE — 83615 LACTATE (LD) (LDH) ENZYME: CPT | Performed by: INTERNAL MEDICINE

## 2023-05-08 PROCEDURE — 80053 COMPREHEN METABOLIC PANEL: CPT | Performed by: INTERNAL MEDICINE

## 2023-05-08 PROCEDURE — 36415 COLL VENOUS BLD VENIPUNCTURE: CPT | Performed by: INTERNAL MEDICINE

## 2023-05-08 ASSESSMENT — ENCOUNTER SYMPTOMS
APNEA: 0
ABDOMINAL PAIN: 0
FATIGUE: 0
CHOKING: 0
TROUBLE SWALLOWING: 0
CONSTIPATION: 0
DIARRHEA: 0
HEADACHES: 0
CONFUSION: 0
SHORTNESS OF BREATH: 0
BRUISES/BLEEDS EASILY: 0
WHEEZING: 0
VOMITING: 0
DIZZINESS: 0
DIAPHORESIS: 0
CHEST TIGHTNESS: 0
BLOOD IN STOOL: 0
BACK PAIN: 0
FEVER: 0
VOICE CHANGE: 0
WEAKNESS: 0
LIGHT-HEADEDNESS: 0
UNEXPECTED WEIGHT CHANGE: 0
SPEECH DIFFICULTY: 0
COUGH: 0
ABDOMINAL DISTENTION: 0
ACTIVITY CHANGE: 0
CHILLS: 0
NAUSEA: 0
SLEEP DISTURBANCE: 0
APPETITE CHANGE: 0
ADENOPATHY: 0

## 2023-05-08 ASSESSMENT — PAIN SCALES - GENERAL: PAINLEVEL: 0

## 2023-05-08 ASSESSMENT — PATIENT HEALTH QUESTIONNAIRE - PHQ9
SUM OF ALL RESPONSES TO PHQ9 QUESTIONS 1 AND 2: 0
SUM OF ALL RESPONSES TO PHQ9 QUESTIONS 1 AND 2: 0
1. LITTLE INTEREST OR PLEASURE IN DOING THINGS: NOT AT ALL
CLINICAL INTERPRETATION OF PHQ2 SCORE: NO FURTHER SCREENING NEEDED
2. FEELING DOWN, DEPRESSED OR HOPELESS: NOT AT ALL

## 2023-05-22 NOTE — TELEPHONE ENCOUNTER
Pt states [facility] says they don't have script for primidone 50 MG Oral Tab sent to them on 2.24.22 with 5 refills. Can this be re-sent?     [facility] [address], [facility] 23, [phone], [phone]
RN called pharmacy to verify receipt of RX that was sent this morning. Pharmacist verified receipt of RX.
Resent RX via written order
Yes

## 2023-05-27 DIAGNOSIS — G25.0 BENIGN ESSENTIAL TREMOR: ICD-10-CM

## 2023-05-30 ENCOUNTER — TELEPHONE (OUTPATIENT)
Dept: SURGERY | Facility: CLINIC | Age: 83
End: 2023-05-30

## 2023-05-30 DIAGNOSIS — G25.0 BENIGN ESSENTIAL TREMOR: ICD-10-CM

## 2023-05-30 RX ORDER — PRIMIDONE 50 MG/1
TABLET ORAL
Qty: 210 TABLET | Refills: 2 | Status: SHIPPED | OUTPATIENT
Start: 2023-05-30
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
## 2023-05-31 ENCOUNTER — TELEPHONE (OUTPATIENT)
Dept: FAMILY MEDICINE CLINIC | Facility: CLINIC | Age: 83
End: 2023-05-31

## 2023-05-31 RX ORDER — PRIMIDONE 50 MG/1
TABLET ORAL
Qty: 210 TABLET | Refills: 2 | OUTPATIENT
Start: 2023-05-31

## 2023-06-19 ENCOUNTER — OFFICE VISIT (OUTPATIENT)
Dept: NEUROLOGY | Facility: CLINIC | Age: 83
End: 2023-06-19
Payer: MEDICARE

## 2023-06-19 VITALS
HEIGHT: 64.5 IN | HEART RATE: 91 BPM | BODY MASS INDEX: 35.59 KG/M2 | DIASTOLIC BLOOD PRESSURE: 60 MMHG | SYSTOLIC BLOOD PRESSURE: 138 MMHG | OXYGEN SATURATION: 96 % | WEIGHT: 211 LBS

## 2023-06-19 DIAGNOSIS — G25.0 BENIGN ESSENTIAL TREMOR: Primary | ICD-10-CM

## 2023-06-19 PROCEDURE — 99213 OFFICE O/P EST LOW 20 MIN: CPT | Performed by: OTHER

## 2023-06-19 RX ORDER — MIRABEGRON 25 MG/1
25 TABLET, FILM COATED, EXTENDED RELEASE ORAL DAILY
COMMUNITY

## 2023-06-22 ENCOUNTER — OFFICE VISIT (OUTPATIENT)
Dept: PODIATRY CLINIC | Facility: CLINIC | Age: 83
End: 2023-06-22

## 2023-06-22 DIAGNOSIS — B35.1 ONYCHOMYCOSIS: ICD-10-CM

## 2023-06-22 DIAGNOSIS — C83.32 DIFFUSE LARGE B-CELL LYMPHOMA OF INTRATHORACIC LYMPH NODES (HCC): ICD-10-CM

## 2023-06-22 DIAGNOSIS — M20.41 HAMMER TOE OF RIGHT FOOT: ICD-10-CM

## 2023-06-22 DIAGNOSIS — M79.675 PAIN IN TOE OF LEFT FOOT: ICD-10-CM

## 2023-06-22 DIAGNOSIS — H54.8 LEGAL BLINDNESS, AS DEFINED IN USA: ICD-10-CM

## 2023-06-22 DIAGNOSIS — M79.674 PAIN OF TOE OF RIGHT FOOT: Primary | ICD-10-CM

## 2023-06-22 PROCEDURE — 99213 OFFICE O/P EST LOW 20 MIN: CPT | Performed by: PODIATRIST

## 2023-07-06 ENCOUNTER — TELEPHONE (OUTPATIENT)
Dept: FAMILY MEDICINE CLINIC | Facility: CLINIC | Age: 83
End: 2023-07-06

## 2023-07-06 DIAGNOSIS — C83.32 DIFFUSE LARGE B-CELL LYMPHOMA OF INTRATHORACIC LYMPH NODES (HCC): ICD-10-CM

## 2023-07-06 DIAGNOSIS — E78.00 PURE HYPERCHOLESTEROLEMIA: Primary | ICD-10-CM

## 2023-07-06 DIAGNOSIS — E55.9 VITAMIN D DEFICIENCY: ICD-10-CM

## 2023-07-06 NOTE — TELEPHONE ENCOUNTER
Please enter lab orders for the patient's upcoming physical appointment. Physical scheduled: Your appointments       Date & Time Appointment Department [facility])    Aug 10, 2023  8:00 AM CDT Medicare Annual Well Visit with MD Maco Deng, [address], [place] ([address])              Maco Leiva, [address], [place] [address] [address] [facility] [phone]           [facility] CAN[facility] CTR & RESEARCH INST)     The patient has been notified to complete fasting labs prior to their physical appointment.

## 2023-07-31 DIAGNOSIS — Z80.3 FAMILY HISTORY OF BREAST CANCER IN MOTHER: ICD-10-CM

## 2023-08-01 RX ORDER — RALOXIFENE HYDROCHLORIDE 60 MG/1
60 TABLET, FILM COATED ORAL DAILY
Qty: 90 TABLET | Refills: 3 | Status: SHIPPED | OUTPATIENT
Start: 2023-08-01

## 2023-08-01 NOTE — TELEPHONE ENCOUNTER
Refill request for:    Requested Prescriptions     Pending Prescriptions Disp Refills    RALOXIFENE 60 MG Oral Tab [Pharmacy Med Name: RALOXIFENE 60MG TABLETS] 90 tablet 3     Sig: TAKE 1 TABLET(60 MG) BY MOUTH EVERY DAY        Last Prescribed Quantity Refills          LOV 12/8/2022     Patient was asked to follow-up in: Not documented in note    Appointment scheduled: 8/10/2023 Demetrius Srivastava MD    Medication not on protocol.    Please advise

## 2023-08-03 DIAGNOSIS — C82.01 GRADE 1 FOLLICULAR LYMPHOMA OF LYMPH NODES OF NECK (CMD): Primary | ICD-10-CM

## 2023-08-07 ENCOUNTER — OFFICE VISIT (OUTPATIENT)
Dept: HEMATOLOGY/ONCOLOGY | Age: 83
End: 2023-08-07
Attending: INTERNAL MEDICINE

## 2023-08-07 ENCOUNTER — LAB SERVICES (OUTPATIENT)
Dept: LAB | Age: 83
End: 2023-08-07
Attending: INTERNAL MEDICINE

## 2023-08-07 VITALS
RESPIRATION RATE: 15 BRPM | HEIGHT: 64 IN | WEIGHT: 210 LBS | DIASTOLIC BLOOD PRESSURE: 72 MMHG | SYSTOLIC BLOOD PRESSURE: 133 MMHG | BODY MASS INDEX: 35.85 KG/M2 | TEMPERATURE: 97.7 F | OXYGEN SATURATION: 96 % | HEART RATE: 74 BPM

## 2023-08-07 DIAGNOSIS — C82.01 GRADE 1 FOLLICULAR LYMPHOMA OF LYMPH NODES OF NECK (CMD): ICD-10-CM

## 2023-08-07 DIAGNOSIS — C82.01 GRADE 1 FOLLICULAR LYMPHOMA OF LYMPH NODES OF NECK (CMD): Primary | ICD-10-CM

## 2023-08-07 LAB
ALBUMIN SERPL-MCNC: 3.8 G/DL (ref 3.6–5.1)
ALBUMIN/GLOB SERPL: 1.1 {RATIO} (ref 1–2.4)
ALP SERPL-CCNC: 58 UNITS/L (ref 45–117)
ALT SERPL-CCNC: 36 UNITS/L
ANION GAP SERPL CALC-SCNC: 9 MMOL/L (ref 7–19)
AST SERPL-CCNC: 31 UNITS/L
BASOPHILS # BLD: 0.1 K/MCL (ref 0–0.3)
BASOPHILS NFR BLD: 1 %
BILIRUB SERPL-MCNC: 0.3 MG/DL (ref 0.2–1)
BUN SERPL-MCNC: 14 MG/DL (ref 6–20)
BUN/CREAT SERPL: 21 (ref 7–25)
CALCIUM SERPL-MCNC: 8.9 MG/DL (ref 8.4–10.2)
CHLORIDE SERPL-SCNC: 108 MMOL/L (ref 97–110)
CO2 SERPL-SCNC: 26 MMOL/L (ref 21–32)
CREAT SERPL-MCNC: 0.67 MG/DL (ref 0.51–0.95)
DEPRECATED RDW RBC: 45.3 FL (ref 39–50)
EOSINOPHIL # BLD: 0.3 K/MCL (ref 0–0.5)
EOSINOPHIL NFR BLD: 6 %
ERYTHROCYTE [DISTWIDTH] IN BLOOD: 13.2 % (ref 11–15)
FASTING DURATION TIME PATIENT: NORMAL H
GFR SERPLBLD BASED ON 1.73 SQ M-ARVRAT: 87 ML/MIN
GLOBULIN SER-MCNC: 3.4 G/DL (ref 2–4)
GLUCOSE SERPL-MCNC: 99 MG/DL (ref 70–99)
HCT VFR BLD CALC: 41.6 % (ref 36–46.5)
HGB BLD-MCNC: 13.9 G/DL (ref 12–15.5)
IMM GRANULOCYTES # BLD AUTO: 0 K/MCL (ref 0–0.2)
IMM GRANULOCYTES # BLD: 0 %
LDH SERPL L TO P-CCNC: 214 UNITS/L (ref 82–240)
LYMPHOCYTES # BLD: 1.2 K/MCL (ref 1–4)
LYMPHOCYTES NFR BLD: 21 %
MCH RBC QN AUTO: 31.2 PG (ref 26–34)
MCHC RBC AUTO-ENTMCNC: 33.4 G/DL (ref 32–36.5)
MCV RBC AUTO: 93.5 FL (ref 78–100)
MONOCYTES # BLD: 0.4 K/MCL (ref 0.3–0.9)
MONOCYTES NFR BLD: 7 %
NEUTROPHILS # BLD: 3.8 K/MCL (ref 1.8–7.7)
NEUTROPHILS NFR BLD: 65 %
NRBC BLD MANUAL-RTO: 0 /100 WBC
PLATELET # BLD AUTO: 152 K/MCL (ref 140–450)
POTASSIUM SERPL-SCNC: 4 MMOL/L (ref 3.4–5.1)
PROT SERPL-MCNC: 7.2 G/DL (ref 6.4–8.2)
RBC # BLD: 4.45 MIL/MCL (ref 4–5.2)
SODIUM SERPL-SCNC: 139 MMOL/L (ref 135–145)
WBC # BLD: 5.8 K/MCL (ref 4.2–11)

## 2023-08-07 PROCEDURE — 85025 COMPLETE CBC W/AUTO DIFF WBC: CPT

## 2023-08-07 PROCEDURE — 36415 COLL VENOUS BLD VENIPUNCTURE: CPT

## 2023-08-07 PROCEDURE — 83615 LACTATE (LD) (LDH) ENZYME: CPT

## 2023-08-07 PROCEDURE — 99215 OFFICE O/P EST HI 40 MIN: CPT | Performed by: INTERNAL MEDICINE

## 2023-08-07 PROCEDURE — 99211 OFF/OP EST MAY X REQ PHY/QHP: CPT

## 2023-08-07 PROCEDURE — 80053 COMPREHEN METABOLIC PANEL: CPT

## 2023-08-07 RX ORDER — MIRABEGRON 50 MG/1
50 TABLET, FILM COATED, EXTENDED RELEASE ORAL DAILY
COMMUNITY
Start: 2023-07-06

## 2023-08-07 ASSESSMENT — ENCOUNTER SYMPTOMS
BACK PAIN: 0
ACTIVITY CHANGE: 0
ABDOMINAL DISTENTION: 0
DIAPHORESIS: 0
CHEST TIGHTNESS: 0
CHILLS: 0
COUGH: 0
NAUSEA: 0
DIZZINESS: 0
SPEECH DIFFICULTY: 0
BLOOD IN STOOL: 0
ADENOPATHY: 0
WEAKNESS: 0
SLEEP DISTURBANCE: 0
UNEXPECTED WEIGHT CHANGE: 0
VOICE CHANGE: 0
HEADACHES: 0
FEVER: 0
APPETITE CHANGE: 0
SHORTNESS OF BREATH: 0
WHEEZING: 0
ABDOMINAL PAIN: 0
CONSTIPATION: 0
CONFUSION: 0
APNEA: 0
CHOKING: 0
VOMITING: 0
BRUISES/BLEEDS EASILY: 0
LIGHT-HEADEDNESS: 0
DIARRHEA: 0
TROUBLE SWALLOWING: 0
FATIGUE: 0

## 2023-08-07 ASSESSMENT — PATIENT HEALTH QUESTIONNAIRE - PHQ9
SUM OF ALL RESPONSES TO PHQ9 QUESTIONS 1 AND 2: 0
1. LITTLE INTEREST OR PLEASURE IN DOING THINGS: NOT AT ALL
2. FEELING DOWN, DEPRESSED OR HOPELESS: NOT AT ALL
SUM OF ALL RESPONSES TO PHQ9 QUESTIONS 1 AND 2: 0
CLINICAL INTERPRETATION OF PHQ2 SCORE: NO FURTHER SCREENING NEEDED

## 2023-08-07 ASSESSMENT — PAIN SCALES - GENERAL: PAINLEVEL: 0

## 2023-08-09 ENCOUNTER — LAB ENCOUNTER (OUTPATIENT)
Dept: LAB | Facility: HOSPITAL | Age: 83
End: 2023-08-09
Attending: FAMILY MEDICINE
Payer: MEDICARE

## 2023-08-09 DIAGNOSIS — E78.00 PURE HYPERCHOLESTEROLEMIA: ICD-10-CM

## 2023-08-09 DIAGNOSIS — E55.9 VITAMIN D DEFICIENCY: ICD-10-CM

## 2023-08-09 DIAGNOSIS — C83.32 DIFFUSE LARGE B-CELL LYMPHOMA OF INTRATHORACIC LYMPH NODES (HCC): ICD-10-CM

## 2023-08-09 LAB
ALBUMIN SERPL-MCNC: 3.7 G/DL (ref 3.4–5)
ALBUMIN/GLOB SERPL: 1.1 {RATIO} (ref 1–2)
ALP LIVER SERPL-CCNC: 55 U/L
ALT SERPL-CCNC: 33 U/L
ANION GAP SERPL CALC-SCNC: 3 MMOL/L (ref 0–18)
AST SERPL-CCNC: 26 U/L (ref 15–37)
BASOPHILS # BLD AUTO: 0.06 X10(3) UL (ref 0–0.2)
BASOPHILS NFR BLD AUTO: 1 %
BILIRUB SERPL-MCNC: 0.3 MG/DL (ref 0.1–2)
BUN BLD-MCNC: 12 MG/DL (ref 7–18)
CALCIUM BLD-MCNC: 9.1 MG/DL (ref 8.5–10.1)
CHLORIDE SERPL-SCNC: 107 MMOL/L (ref 98–112)
CHOLEST SERPL-MCNC: 221 MG/DL (ref ?–200)
CO2 SERPL-SCNC: 28 MMOL/L (ref 21–32)
CREAT BLD-MCNC: 0.67 MG/DL
EGFRCR SERPLBLD CKD-EPI 2021: 87 ML/MIN/1.73M2 (ref 60–?)
EOSINOPHIL # BLD AUTO: 0.36 X10(3) UL (ref 0–0.7)
EOSINOPHIL NFR BLD AUTO: 5.7 %
ERYTHROCYTE [DISTWIDTH] IN BLOOD BY AUTOMATED COUNT: 13 %
FASTING PATIENT LIPID ANSWER: YES
FASTING STATUS PATIENT QL REPORTED: YES
GLOBULIN PLAS-MCNC: 3.4 G/DL (ref 2.8–4.4)
GLUCOSE BLD-MCNC: 104 MG/DL (ref 70–99)
HCT VFR BLD AUTO: 43.1 %
HDLC SERPL-MCNC: 57 MG/DL (ref 40–59)
HGB BLD-MCNC: 14.3 G/DL
IMM GRANULOCYTES # BLD AUTO: 0.01 X10(3) UL (ref 0–1)
IMM GRANULOCYTES NFR BLD: 0.2 %
LDLC SERPL CALC-MCNC: 133 MG/DL (ref ?–100)
LYMPHOCYTES # BLD AUTO: 1.86 X10(3) UL (ref 1–4)
LYMPHOCYTES NFR BLD AUTO: 29.7 %
MCH RBC QN AUTO: 30.5 PG (ref 26–34)
MCHC RBC AUTO-ENTMCNC: 33.2 G/DL (ref 31–37)
MCV RBC AUTO: 91.9 FL
MONOCYTES # BLD AUTO: 0.3 X10(3) UL (ref 0.1–1)
MONOCYTES NFR BLD AUTO: 4.8 %
NEUTROPHILS # BLD AUTO: 3.68 X10 (3) UL (ref 1.5–7.7)
NEUTROPHILS # BLD AUTO: 3.68 X10(3) UL (ref 1.5–7.7)
NEUTROPHILS NFR BLD AUTO: 58.6 %
NONHDLC SERPL-MCNC: 164 MG/DL (ref ?–130)
OSMOLALITY SERPL CALC.SUM OF ELEC: 286 MOSM/KG (ref 275–295)
PLATELET # BLD AUTO: 157 10(3)UL (ref 150–450)
POTASSIUM SERPL-SCNC: 5 MMOL/L (ref 3.5–5.1)
PROT SERPL-MCNC: 7.1 G/DL (ref 6.4–8.2)
RBC # BLD AUTO: 4.69 X10(6)UL
SODIUM SERPL-SCNC: 138 MMOL/L (ref 136–145)
TRIGL SERPL-MCNC: 175 MG/DL (ref 30–149)
VIT D+METAB SERPL-MCNC: 48.1 NG/ML (ref 30–100)
VLDLC SERPL CALC-MCNC: 32 MG/DL (ref 0–30)
WBC # BLD AUTO: 6.3 X10(3) UL (ref 4–11)

## 2023-08-09 PROCEDURE — 82306 VITAMIN D 25 HYDROXY: CPT

## 2023-08-09 PROCEDURE — 85025 COMPLETE CBC W/AUTO DIFF WBC: CPT

## 2023-08-09 PROCEDURE — 80061 LIPID PANEL: CPT

## 2023-08-09 PROCEDURE — 80053 COMPREHEN METABOLIC PANEL: CPT

## 2023-08-09 PROCEDURE — 36415 COLL VENOUS BLD VENIPUNCTURE: CPT

## 2023-08-10 ENCOUNTER — OFFICE VISIT (OUTPATIENT)
Dept: FAMILY MEDICINE CLINIC | Facility: CLINIC | Age: 83
End: 2023-08-10
Payer: MEDICARE

## 2023-08-10 VITALS
BODY MASS INDEX: 35.75 KG/M2 | OXYGEN SATURATION: 94 % | RESPIRATION RATE: 18 BRPM | SYSTOLIC BLOOD PRESSURE: 120 MMHG | WEIGHT: 212 LBS | HEIGHT: 64.5 IN | HEART RATE: 96 BPM | DIASTOLIC BLOOD PRESSURE: 58 MMHG

## 2023-08-10 DIAGNOSIS — I70.0 ABDOMINAL AORTIC ATHEROSCLEROSIS (HCC): ICD-10-CM

## 2023-08-10 DIAGNOSIS — M25.50 ARTHRALGIA, UNSPECIFIED JOINT: ICD-10-CM

## 2023-08-10 DIAGNOSIS — Z00.00 ENCOUNTER FOR ANNUAL HEALTH EXAMINATION: Primary | ICD-10-CM

## 2023-08-10 DIAGNOSIS — C83.32 DIFFUSE LARGE B-CELL LYMPHOMA OF INTRATHORACIC LYMPH NODES (HCC): ICD-10-CM

## 2023-08-10 DIAGNOSIS — Z12.31 BREAST CANCER SCREENING BY MAMMOGRAM: ICD-10-CM

## 2023-08-10 DIAGNOSIS — H54.8 LEGAL BLINDNESS, AS DEFINED IN USA: ICD-10-CM

## 2023-08-10 DIAGNOSIS — Z80.3 FAMILY HISTORY OF BREAST CANCER IN MOTHER: ICD-10-CM

## 2023-08-10 DIAGNOSIS — M48.00 SPINAL STENOSIS, UNSPECIFIED SPINAL REGION: ICD-10-CM

## 2023-08-10 DIAGNOSIS — E78.00 PURE HYPERCHOLESTEROLEMIA: ICD-10-CM

## 2023-08-10 DIAGNOSIS — D32.9 MENINGIOMA (HCC): ICD-10-CM

## 2023-08-10 DIAGNOSIS — G25.0 ESSENTIAL TREMOR: ICD-10-CM

## 2023-08-10 DIAGNOSIS — E66.01 SEVERE OBESITY WITH BODY MASS INDEX (BMI) OF 35.0 TO 39.9 WITH SERIOUS COMORBIDITY (HCC): ICD-10-CM

## 2023-08-10 DIAGNOSIS — N60.19 FIBROCYSTIC BREAST DISEASE (FCBD), UNSPECIFIED LATERALITY: ICD-10-CM

## 2023-08-10 PROCEDURE — G0439 PPPS, SUBSEQ VISIT: HCPCS | Performed by: FAMILY MEDICINE

## 2023-08-10 RX ORDER — MIRABEGRON 50 MG/1
50 TABLET, FILM COATED, EXTENDED RELEASE ORAL DAILY
COMMUNITY
Start: 2023-07-06

## 2023-08-10 RX ORDER — RALOXIFENE HYDROCHLORIDE 60 MG/1
60 TABLET, FILM COATED ORAL DAILY
Qty: 90 TABLET | Refills: 3 | Status: SHIPPED | OUTPATIENT
Start: 2023-08-10

## 2023-08-10 RX ORDER — CELECOXIB 200 MG/1
200 CAPSULE ORAL DAILY
Qty: 90 CAPSULE | Refills: 3 | Status: SHIPPED | OUTPATIENT
Start: 2023-08-10

## 2023-08-15 ENCOUNTER — HOSPITAL ENCOUNTER (OUTPATIENT)
Dept: MAMMOGRAPHY | Facility: HOSPITAL | Age: 83
Discharge: HOME | End: 2023-08-15
Attending: FAMILY MEDICINE
Payer: MEDICARE

## 2023-08-15 DIAGNOSIS — N60.19 FIBROCYSTIC BREAST DISEASE (FCBD), UNSPECIFIED LATERALITY: ICD-10-CM

## 2023-08-15 DIAGNOSIS — Z12.31 BREAST CANCER SCREENING BY MAMMOGRAM: ICD-10-CM

## 2023-08-15 PROCEDURE — 77067 SCR MAMMO BI INCL CAD: CPT | Performed by: FAMILY MEDICINE

## 2023-08-15 PROCEDURE — 77063 BREAST TOMOSYNTHESIS BI: CPT | Performed by: FAMILY MEDICINE

## 2023-08-22 ENCOUNTER — TELEPHONE (OUTPATIENT)
Dept: HEMATOLOGY/ONCOLOGY | Age: 83
End: 2023-08-22

## 2023-08-25 DIAGNOSIS — G25.0 BENIGN ESSENTIAL TREMOR: ICD-10-CM

## 2023-08-25 RX ORDER — PRIMIDONE 50 MG/1
TABLET ORAL
Qty: 210 TABLET | Refills: 2 | Status: SHIPPED | OUTPATIENT
Start: 2023-08-25

## 2023-08-25 NOTE — TELEPHONE ENCOUNTER
Medication: PRIMIDONE 50 MG Oral Tab     Date of last refill: 05/30/2023 (#210/2)  [place]     Last office visit: 06/19/2023  Due back to clinic per last office note:  Around 12/19/2023  Date next office visit scheduled:    Future Appointments   Date Time Provider Peter Walton   10/2/2023  9:00 AM Clemente George DPM [id number]   10/13/2023  2:00 PM David Israel MD [facility] DERM [facility]   10/31/2023  8:00 AM [address] PET DOSE RM1 [address] PET Peña Frazierman   10/31/2023  9:15 AM [address] PET SCANNER [address] PET [facility]   12/20/2023  1:00 PM Margarita Martinez MD ENINAPER [facility] [place]           Last OV note recommendation:    ASSESSMENT/PLAN:   Benign essential tremor  (primary encounter diagnosis)     Tremors stable overall     Continue Primidone 150 mg AM, 150 mg noon and 50 mg PM.   Intolerant to Propranolol. If any worsening particularly hand tremors will consider adjusting the dose further     Continue non pharmacological intervention        RTC in about 6-12 months     See orders and medications filed with this encounter. The patient indicates understanding of these issues and agrees with the plan. No orders of the defined types were placed in this encounter.

## 2023-09-14 ENCOUNTER — OFFICE VISIT (OUTPATIENT)
Dept: FAMILY MEDICINE CLINIC | Facility: CLINIC | Age: 83
End: 2023-09-14
Payer: MEDICARE

## 2023-09-14 VITALS
HEART RATE: 96 BPM | DIASTOLIC BLOOD PRESSURE: 80 MMHG | RESPIRATION RATE: 18 BRPM | BODY MASS INDEX: 35.18 KG/M2 | SYSTOLIC BLOOD PRESSURE: 122 MMHG | HEIGHT: 64.5 IN | WEIGHT: 208.63 LBS | TEMPERATURE: 97 F | OXYGEN SATURATION: 96 %

## 2023-09-14 DIAGNOSIS — J01.90 ACUTE NON-RECURRENT SINUSITIS, UNSPECIFIED LOCATION: Primary | ICD-10-CM

## 2023-09-14 PROCEDURE — 99213 OFFICE O/P EST LOW 20 MIN: CPT | Performed by: NURSE PRACTITIONER

## 2023-09-14 RX ORDER — DOXYCYCLINE HYCLATE 100 MG
100 TABLET ORAL 2 TIMES DAILY
Qty: 14 TABLET | Refills: 0 | Status: SHIPPED | OUTPATIENT
Start: 2023-09-14

## 2023-10-02 ENCOUNTER — OFFICE VISIT (OUTPATIENT)
Dept: PODIATRY CLINIC | Facility: CLINIC | Age: 83
End: 2023-10-02

## 2023-10-02 DIAGNOSIS — M79.675 PAIN IN TOE OF LEFT FOOT: ICD-10-CM

## 2023-10-02 DIAGNOSIS — M79.674 PAIN OF TOE OF RIGHT FOOT: Primary | ICD-10-CM

## 2023-10-02 DIAGNOSIS — B35.1 ONYCHOMYCOSIS: ICD-10-CM

## 2023-10-02 DIAGNOSIS — L84 HELOMA DURUM: ICD-10-CM

## 2023-10-02 DIAGNOSIS — H54.8 LEGAL BLINDNESS, AS DEFINED IN USA: ICD-10-CM

## 2023-10-02 PROCEDURE — 99213 OFFICE O/P EST LOW 20 MIN: CPT | Performed by: PODIATRIST

## 2023-10-02 NOTE — PROGRESS NOTES
Candi Catherine is a 81year old female. Patient presents with:  Toenail Care: Nail care and foot check        HPI:   Patient returns to the clinic for routine care she is legally blind. She has had no recent hospitalizations or changes in medical condition. She is unable to trim her toenails herself. The patient and her  cannot provide self-care she recently got back from a trip her feet feel good but her nails are getting long and thick and beginning to catch on her socks. At today's visit reviewed nurse's history as taken above, allergies medications and medical history as documented below. All changes duly noted  Allergies: Oxycontin [Oxycodone], Adhesive Tape, Ampicillin, Cleocin [Clindamycin], and Neosporin [Neomycin-Bacitracin-Polymyxin]   Current Outpatient Medications   Medication Sig Dispense Refill    Doxycycline Hyclate 100 MG Oral Tab Take 1 tablet (100 mg total) by mouth 2 (two) times daily. 14 tablet 0    primidone 50 MG Oral Tab TAKE 3 TABLETS BY MOUTH EVERY MORNING, 3 TABLETS AT NOON AND 1 TABLET IN THE EVENING 210 tablet 2    MYRBETRIQ 50 MG Oral Tablet 24 Hr Take 1 tablet (50 mg total) by mouth daily. celecoxib 200 MG Oral Cap Take 1 capsule (200 mg total) by mouth daily. 90 capsule 3    raloxifene 60 MG Oral Tab Take 1 tablet (60 mg total) by mouth daily. 90 tablet 3    gabapentin 100 MG Oral Cap Take 1 capsule (100 mg total) by mouth 3 (three) times daily. 90 capsule 0    HYDROcodone-acetaminophen (NORCO) 5-325 MG Oral Tab Take 1 tablet by mouth every 6 (six) hours as needed for Pain. 12 tablet 0    mupirocin 2 % External Ointment APPLY TOPICALLY TO SURGERY SITE TWICE DAILY      dicyclomine 10 MG Oral Cap Take 1 capsule (10 mg total) by mouth 4 (four) times daily before meals and nightly. 120 capsule 1    Desonide 0.05 % External Cream Apply 1 g topically 2 (two) times daily. Apply sparingly and rub gently into the affected area(s).  60 g 3    Probiotic Product (PROBIOTIC-10 OR) Take by mouth. aspirin 81 MG Oral Tab Take 1 tablet (81 mg total) by mouth daily. Multivitamin Chewtab, ADULT, Oral Chew Tab Chew 1 tablet by mouth daily. Omega-3 Fatty Acids (FISH OIL) 1000 MG Oral Cap Take 1,000 mg by mouth daily. Calcium 500 MG Oral Chew Tab Chew 1 tablet by mouth daily. Cholecalciferol (VITAMIN D) 1000 UNITS Oral Tab Take 1 Tab by mouth daily. Psyllium 48.57 % Oral Powder Take 1 Packet by mouth daily.         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    Osteoarthrosis, unspecified whether generalized or localized, unspecified site     Visual impairment     no vision in left eye- glasses for right eye      Past Surgical History:   Procedure Laterality Date    COLONOSCOPY  2004    COLONOSCOPY N/A 10/23/2014    Procedure: COLONOSCOPY;  Surgeon: Carmelina Booker MD;  Location: [address] [place] [address] Dr KAMAR Mejia  2004    EYE SURGERY  12/2002    L- optic nerve sx d/t tumor    HOWARD BIOPSY STEREO NODULE 2 SITE BILAT (CPT=19081/83965)      ~1990    HOWARD LOCALIZATION WIRE 1 SITE LEFT (CPT=19281)  2003    neg    HOWARD LOCALIZATION WIRE 1 SITE RIGHT (CPT=19281)  1986    neg    NEEDLE BIOPSY LEFT      benign not sure when    OTHER SURGICAL HISTORY  2/2006    excision of supraclavicle lymphoma    OTHER SURGICAL HISTORY Left 07/2020, 08/2020    BASAL cell carcinoma removal of eyebrow    TUBAL LIGATION  1976      Family History   Problem Relation Age of Onset    Breast Cancer Mother 59        B/L, age 59 then 76    Heart Attack Father     Other (Other) Father     Other (diverticulosis) Brother     Heart Disease Brother     Stroke Maternal Grandmother     Other (alzheimer's) Maternal Grandfather     Heart Attack Paternal Grandmother     Breast Cancer Maternal Aunt 67    Ovarian Cancer Maternal Cousin Female 61    Cancer Daughter         uterine      Social History    Socioeconomic History      Marital status:     Occupational History      Occupation: Retired    Tobacco Use      Smoking status: Never      Smokeless tobacco: Never    Vaping Use      Vaping Use: Never used    Substance and Sexual Activity      Alcohol use: No        Alcohol/week: 0.0 standard drinks of alcohol      Drug use: No    Other Topics      Concerns:        Caffeine Concern: No          1 cup daily decaf        Exercise: No        Seat Belt: Yes        Self-Exams: Yes          when remember in shower          REVIEW OF SYSTEMS:   Review of Systems  Today reviewed systens as documented below  GENERAL HEALTH: feels well otherwise  SKIN: denies any unusual skin lesions or rashes  RESPIRATORY: denies shortness of breath with exertion  CARDIOVASCULAR: denies chest pain on exertion  GI: denies abdominal pain and denies heartburn  NEURO: denies headaches    EXAM:   There were no vitals taken for this visit. Physical Exam  GENERAL: well developed, well nourished, in no apparent distress  EXTREMITIES:   1. Integument: The nails 2-5 on the right 1-5 on the left are relatively within normal limits but still mildly thickened the second toe on the left does have a little thickening the right hallux nail is thickened but has a significantly improved appearance. She is wearing the pad she no longer has a painful keratosis on her fourth toe of the left foot the right hallux nail continues to improve slightly. There are no sores or ulcerations present. There were no sores ulcerations or other problems noted there is no incurvation or paronychia. 2. Vascular: The patient has palpable dorsalis pedis and posterior tibial pulses bilateral, however posterior tibials a little bit weaker since her last visit   3. Neurologic: The patient has pain sensation intact with no deficits.    4. Musculoskeletal: The patient has good muscle strength there are no deficits noted she has 5 out of 5 against resistance of all muscle groups affecting the foot ankle and lower leg. Her fourth and fifth toes on the right foot have an adductovarus hammertoe deformity these do not reduce on forefoot loading and cannot be manipulated straight there is a painful corn at the tip of the fourth toe right foot. ASSESSMENT AND PLAN:   Diagnoses and all orders for this visit:    Pain of toe of right foot    Pain in toe of left foot    Onychomycosis    Legal blindness, as defined in [facility]: Today using a nail nippers were trimmed and debrided toenails 1-5 manually and mechanically in girth and width as far down to healthy tissue as possible on both feet. This totaled 9 toenails. This was done uneventfully there was no hemorrhage. There is mild incurvation of the medial and lateral nail borders of the hallux which using a slant back technique were removed and they would not get ingrown. Again the patient was reminded not to attempt to trim her own toenails because of her visual impairment. Home care instructions dispensed return to clinic sooner if there is a problem. Today reviewed proper foot hygiene regular moisturizing  Reviewed proper fitting shoes. Advised follow-up in about 10 weeks instead of 3 months because her nails were just slightly too long at this visit. The patient indicates understanding of these issues and agrees to the plan.       Og Almanza DPM

## 2023-10-13 ENCOUNTER — EXTERNAL RECORD (OUTPATIENT)
Dept: HEALTH INFORMATION MANAGEMENT | Facility: OTHER | Age: 83
End: 2023-10-13

## 2023-10-23 DIAGNOSIS — C82.01 GRADE 1 FOLLICULAR LYMPHOMA OF LYMPH NODES OF NECK (CMD): Primary | ICD-10-CM

## 2023-10-31 ENCOUNTER — HOSPITAL ENCOUNTER (OUTPATIENT)
Dept: NUCLEAR MEDICINE | Facility: HOSPITAL | Age: 83
Discharge: HOME | End: 2023-10-31
Attending: INTERNAL MEDICINE
Payer: MEDICARE

## 2023-10-31 DIAGNOSIS — C85.91 LYMPHOMA OF LYMPH NODES OF NECK, UNSPECIFIED LYMPHOMA TYPE (HCC): ICD-10-CM

## 2023-10-31 LAB — GLUCOSE BLD-MCNC: 108 MG/DL (ref 70–99)

## 2023-10-31 PROCEDURE — 78815 PET IMAGE W/CT SKULL-THIGH: CPT | Performed by: INTERNAL MEDICINE

## 2023-10-31 PROCEDURE — 82962 GLUCOSE BLOOD TEST: CPT

## 2023-11-06 ENCOUNTER — OFFICE VISIT (OUTPATIENT)
Dept: HEMATOLOGY/ONCOLOGY | Age: 83
End: 2023-11-06
Attending: INTERNAL MEDICINE

## 2023-11-06 ENCOUNTER — LAB SERVICES (OUTPATIENT)
Dept: LAB | Age: 83
End: 2023-11-06
Attending: INTERNAL MEDICINE

## 2023-11-06 VITALS
WEIGHT: 210 LBS | TEMPERATURE: 97.4 F | RESPIRATION RATE: 16 BRPM | OXYGEN SATURATION: 97 % | SYSTOLIC BLOOD PRESSURE: 138 MMHG | BODY MASS INDEX: 35.85 KG/M2 | HEIGHT: 64 IN | DIASTOLIC BLOOD PRESSURE: 67 MMHG | HEART RATE: 66 BPM

## 2023-11-06 DIAGNOSIS — C82.01 GRADE 1 FOLLICULAR LYMPHOMA OF LYMPH NODES OF NECK (CMD): Primary | ICD-10-CM

## 2023-11-06 DIAGNOSIS — C82.01 GRADE 1 FOLLICULAR LYMPHOMA OF LYMPH NODES OF NECK (CMD): ICD-10-CM

## 2023-11-06 LAB
ALBUMIN SERPL-MCNC: 3.6 G/DL (ref 3.6–5.1)
ALBUMIN/GLOB SERPL: 1.1 {RATIO} (ref 1–2.4)
ALP SERPL-CCNC: 55 UNITS/L (ref 45–117)
ALT SERPL-CCNC: 33 UNITS/L
ANION GAP SERPL CALC-SCNC: 6 MMOL/L (ref 7–19)
AST SERPL-CCNC: 27 UNITS/L
BASOPHILS # BLD: 0.1 K/MCL (ref 0–0.3)
BASOPHILS NFR BLD: 1 %
BILIRUB SERPL-MCNC: 0.2 MG/DL (ref 0.2–1)
BUN SERPL-MCNC: 16 MG/DL (ref 6–20)
BUN/CREAT SERPL: 20 (ref 7–25)
CALCIUM SERPL-MCNC: 8.9 MG/DL (ref 8.4–10.2)
CHLORIDE SERPL-SCNC: 105 MMOL/L (ref 97–110)
CO2 SERPL-SCNC: 32 MMOL/L (ref 21–32)
CREAT SERPL-MCNC: 0.8 MG/DL (ref 0.51–0.95)
DEPRECATED RDW RBC: 46.3 FL (ref 39–50)
EGFRCR SERPLBLD CKD-EPI 2021: 74 ML/MIN/{1.73_M2}
EOSINOPHIL # BLD: 0.3 K/MCL (ref 0–0.5)
EOSINOPHIL NFR BLD: 6 %
ERYTHROCYTE [DISTWIDTH] IN BLOOD: 13.2 % (ref 11–15)
FASTING DURATION TIME PATIENT: ABNORMAL H
GLOBULIN SER-MCNC: 3.4 G/DL (ref 2–4)
GLUCOSE SERPL-MCNC: 111 MG/DL (ref 70–99)
HCT VFR BLD CALC: 41.9 % (ref 36–46.5)
HGB BLD-MCNC: 13.7 G/DL (ref 12–15.5)
IMM GRANULOCYTES # BLD AUTO: 0 K/MCL (ref 0–0.2)
IMM GRANULOCYTES # BLD: 0 %
LDH SERPL L TO P-CCNC: 205 UNITS/L (ref 82–240)
LYMPHOCYTES # BLD: 1.2 K/MCL (ref 1–4)
LYMPHOCYTES NFR BLD: 21 %
MCH RBC QN AUTO: 31.1 PG (ref 26–34)
MCHC RBC AUTO-ENTMCNC: 32.7 G/DL (ref 32–36.5)
MCV RBC AUTO: 95.2 FL (ref 78–100)
MONOCYTES # BLD: 0.3 K/MCL (ref 0.3–0.9)
MONOCYTES NFR BLD: 6 %
NEUTROPHILS # BLD: 3.7 K/MCL (ref 1.8–7.7)
NEUTROPHILS NFR BLD: 66 %
NRBC BLD MANUAL-RTO: 0 /100 WBC
PLATELET # BLD AUTO: 159 K/MCL (ref 140–450)
POTASSIUM SERPL-SCNC: 4.4 MMOL/L (ref 3.4–5.1)
PROT SERPL-MCNC: 7 G/DL (ref 6.4–8.2)
RBC # BLD: 4.4 MIL/MCL (ref 4–5.2)
SODIUM SERPL-SCNC: 139 MMOL/L (ref 135–145)
WBC # BLD: 5.6 K/MCL (ref 4.2–11)

## 2023-11-06 PROCEDURE — 99215 OFFICE O/P EST HI 40 MIN: CPT | Performed by: INTERNAL MEDICINE

## 2023-11-06 PROCEDURE — 80053 COMPREHEN METABOLIC PANEL: CPT

## 2023-11-06 PROCEDURE — 83615 LACTATE (LD) (LDH) ENZYME: CPT

## 2023-11-06 PROCEDURE — 85025 COMPLETE CBC W/AUTO DIFF WBC: CPT

## 2023-11-06 PROCEDURE — 99211 OFF/OP EST MAY X REQ PHY/QHP: CPT

## 2023-11-06 ASSESSMENT — ENCOUNTER SYMPTOMS
CHILLS: 0
DIARRHEA: 0
CONFUSION: 0
ACTIVITY CHANGE: 0
VOICE CHANGE: 0
WHEEZING: 0
FATIGUE: 0
LIGHT-HEADEDNESS: 0
CHOKING: 0
ADENOPATHY: 0
WEAKNESS: 0
VOMITING: 0
NAUSEA: 0
SPEECH DIFFICULTY: 0
UNEXPECTED WEIGHT CHANGE: 0
BRUISES/BLEEDS EASILY: 0
APPETITE CHANGE: 0
BLOOD IN STOOL: 0
CONSTIPATION: 0
HEADACHES: 0
TROUBLE SWALLOWING: 0
COUGH: 0
FEVER: 0
SLEEP DISTURBANCE: 0
BACK PAIN: 0
ABDOMINAL DISTENTION: 0
ABDOMINAL PAIN: 0
APNEA: 0
DIZZINESS: 0
DIAPHORESIS: 0
CHEST TIGHTNESS: 0
SHORTNESS OF BREATH: 0

## 2023-11-06 ASSESSMENT — PATIENT HEALTH QUESTIONNAIRE - PHQ9
CLINICAL INTERPRETATION OF PHQ2 SCORE: NO FURTHER SCREENING NEEDED
1. LITTLE INTEREST OR PLEASURE IN DOING THINGS: NOT AT ALL
SUM OF ALL RESPONSES TO PHQ9 QUESTIONS 1 AND 2: 0
SUM OF ALL RESPONSES TO PHQ9 QUESTIONS 1 AND 2: 0
2. FEELING DOWN, DEPRESSED OR HOPELESS: NOT AT ALL

## 2023-11-06 ASSESSMENT — PAIN SCALES - GENERAL: PAINLEVEL: 0

## 2023-11-21 DIAGNOSIS — G25.0 BENIGN ESSENTIAL TREMOR: ICD-10-CM

## 2023-11-22 RX ORDER — PRIMIDONE 50 MG/1
TABLET ORAL
Qty: 210 TABLET | Refills: 2 | Status: SHIPPED | OUTPATIENT
Start: 2023-11-22

## 2023-11-22 NOTE — TELEPHONE ENCOUNTER
Medication: PRIMIDONE 50 MG Oral Tab     Date of last refill: 08/25/23 (210/2)  [place] [birth year]60[birth year]    Last office visit: 06/19/23  Due back to clinic per last office note:  6-12 months  Date next office visit scheduled:    Future Appointments   Date Time Provider Peter Walton   11/22/2023 10:30 AM Chela Hall MD [facility] DERM [facility]   12/11/2023  9:30 AM Keshia Mccoy DPM [id number]   12/20/2023  1:00 PM Kady Montelongo MD ENINAPER [facility] [place]   1/15/2024  2:00 PM Chela Hall MD [facility]        Last OV note recommendation:     PLAN:   Benign essential tremor  (primary encounter diagnosis)     Tremors stable overall     Continue Primidone 150 mg AM, 150 mg noon and 50 mg PM.   Intolerant to Propranolol.  If any worsening particularly hand tremors will consider adjusting the dose further     Continue non pharmacological intervention        RTC in about 6-12 months

## 2023-12-11 ENCOUNTER — OFFICE VISIT (OUTPATIENT)
Dept: PODIATRY CLINIC | Facility: CLINIC | Age: 83
End: 2023-12-11
Payer: MEDICARE

## 2023-12-11 DIAGNOSIS — H54.8 LEGAL BLINDNESS, AS DEFINED IN USA: ICD-10-CM

## 2023-12-11 DIAGNOSIS — M20.41 HAMMER TOE OF RIGHT FOOT: ICD-10-CM

## 2023-12-11 DIAGNOSIS — B35.1 ONYCHOMYCOSIS: ICD-10-CM

## 2023-12-11 DIAGNOSIS — L84 HELOMA DURUM: ICD-10-CM

## 2023-12-11 DIAGNOSIS — M79.674 PAIN OF TOE OF RIGHT FOOT: Primary | ICD-10-CM

## 2023-12-11 DIAGNOSIS — M79.675 PAIN IN TOE OF LEFT FOOT: ICD-10-CM

## 2023-12-11 PROCEDURE — 99213 OFFICE O/P EST LOW 20 MIN: CPT | Performed by: PODIATRIST

## 2023-12-11 NOTE — PROGRESS NOTES
Albin Flores is a 81year old female. Chief Complaint   Patient presents with    Toenail Care     Nail care and foot check. HPI:   Patient returns to the clinic for routine care she is legally blind. She has had no recent hospitalizations or changes in medical condition. She is unable to trim her toenails herself. The patient and her  cannot provide self-care she recently got back from a trip her feet feel good but her nails are getting long and thick and beginning to catch on her socks. Both she and her  have recently had a case of the flu. At today's visit reviewed nurse's history as taken above, allergies medications and medical history as documented below. All changes duly noted  Allergies: Oxycontin [oxycodone], Adhesive tape, Ampicillin, Cleocin [clindamycin], and Neosporin [neomycin-bacitracin-polymyxin]   Current Outpatient Medications   Medication Sig Dispense Refill    primidone 50 MG Oral Tab TAKE 3 TABLETS BY MOUTH EVERY MORNING, 3 TABLETS AT NOON, AND 1 TABLET AT BEDTIME 210 tablet 2    triamcinolone 0.1 % External Cream Apply 1 Application topically 2 (two) times daily. 454 g 1    Doxycycline Hyclate 100 MG Oral Tab Take 1 tablet (100 mg total) by mouth 2 (two) times daily. 14 tablet 0    MYRBETRIQ 50 MG Oral Tablet 24 Hr Take 1 tablet (50 mg total) by mouth daily. celecoxib 200 MG Oral Cap Take 1 capsule (200 mg total) by mouth daily. 90 capsule 3    raloxifene 60 MG Oral Tab Take 1 tablet (60 mg total) by mouth daily. 90 tablet 3    mupirocin 2 % External Ointment APPLY TOPICALLY TO SURGERY SITE TWICE DAILY      dicyclomine 10 MG Oral Cap Take 1 capsule (10 mg total) by mouth 4 (four) times daily before meals and nightly. 120 capsule 1    Desonide 0.05 % External Cream Apply 1 g topically 2 (two) times daily. Apply sparingly and rub gently into the affected area(s). 60 g 3    Probiotic Product (PROBIOTIC-10 OR) Take by mouth.       aspirin 81 MG Oral Tab Take 1 tablet (81 mg total) by mouth daily. Multivitamin Chewtab, ADULT, Oral Chew Tab Chew 1 tablet by mouth daily. Omega-3 Fatty Acids (FISH OIL) 1000 MG Oral Cap Take 1,000 mg by mouth daily. Calcium 500 MG Oral Chew Tab Chew 1 tablet by mouth daily. Cholecalciferol (VITAMIN D) 1000 UNITS Oral Tab Take 1 Tab by mouth daily. Psyllium 48.57 % Oral Powder Take 1 Packet by mouth daily. gabapentin 100 MG Oral Cap Take 1 capsule (100 mg total) by mouth 3 (three) times daily. 90 capsule 0    HYDROcodone-acetaminophen (NORCO) 5-325 MG Oral Tab Take 1 tablet by mouth every 6 (six) hours as needed for Pain.  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    Osteoarthrosis, unspecified whether generalized or localized, unspecified site     Visual impairment     no vision in left eye- glasses for right eye      Past Surgical History:   Procedure Laterality Date    COLONOSCOPY  2004    COLONOSCOPY N/A 10/23/2014    Procedure: COLONOSCOPY;  Surgeon: Stevenson Gómez MD;  Location: [address] [place] [address] Dr KAMAR Medrano  2004    EYE SURGERY  12/2002    L- optic nerve sx d/t tumor    HOWARD BIOPSY STEREO NODULE 2 SITE BILAT (CPT=19081/10411)      ~1990    HOWARD LOCALIZATION WIRE 1 SITE LEFT (CPT=19281)  2003    neg    HOWARD LOCALIZATION WIRE 1 SITE RIGHT (CPT=19281)  1986    neg    NEEDLE BIOPSY LEFT      benign not sure when    OTHER SURGICAL HISTORY  2/2006    excision of supraclavicle lymphoma    OTHER SURGICAL HISTORY Left 07/2020, 08/2020    BASAL cell carcinoma removal of eyebrow    TUBAL LIGATION  1976      Family History   Problem Relation Age of Onset    Breast Cancer Mother 59        B/L, age 59 then 76    Heart Attack Father     Other (Other) Father     Other (diverticulosis) Brother     Heart Disease Brother Stroke Maternal Grandmother     Other (alzheimer's) Maternal Grandfather     Heart Attack Paternal Grandmother     Breast Cancer Maternal Aunt 79    Ovarian Cancer Maternal Cousin Female 61    Cancer Daughter         uterine      Social History     Socioeconomic History    Marital status:    Occupational History    Occupation: Retired   Tobacco Use    Smoking status: Never    Smokeless tobacco: Never   Vaping Use    Vaping Use: Never used   Substance and Sexual Activity    Alcohol use: No     Alcohol/week: 0.0 standard drinks of alcohol    Drug use: No   Other Topics Concern    Caffeine Concern No     Comment: 1 cup daily decaf    Exercise No    Seat Belt Yes    Self-Exams Yes     Comment: when remember in shower           REVIEW OF SYSTEMS:   Review of Systems  Today reviewed systens as documented below  GENERAL HEALTH: feels well otherwise  SKIN: denies any unusual skin lesions or rashes  RESPIRATORY: denies shortness of breath with exertion  CARDIOVASCULAR: denies chest pain on exertion  GI: denies abdominal pain and denies heartburn  NEURO: denies headaches    EXAM:   There were no vitals taken for this visit. Physical Exam  GENERAL: well developed, well nourished, in no apparent distress  EXTREMITIES:   1. Integument: The nails 2-5 on the right 1-5 on the left are relatively within normal limits but still mildly thickened the second toe on the left does have a little thickening the right hallux nail is thickened but has a significantly improved appearance. She is wearing the pad she no longer has a painful keratosis on her fourth toe of the left foot the right hallux nail continues to improve slightly. There are no sores or ulcerations present. There were no sores ulcerations or other problems noted there is no incurvation or paronychia. 2. Vascular: The patient has palpable dorsalis pedis and posterior tibial pulses bilateral, however posterior tibials a little bit weaker since her last visit   3. Neurologic: The patient has pain sensation intact with no deficits. 4. Musculoskeletal: The patient has good muscle strength there are no deficits noted she has 5 out of 5 against resistance of all muscle groups affecting the foot ankle and lower leg. Her fourth and fifth toes on the right foot have an adductovarus hammertoe deformity these do not reduce on forefoot loading and cannot be manipulated straight there is a painful corn at the tip of the fourth toe right foot. ASSESSMENT AND PLAN:   Diagnoses and all orders for this visit:    Pain of toe of right foot    Pain in toe of left foot    Onychomycosis    Legal blindness, as defined in [address] [place] [address] toe of right foot        Plan: Today using a nail nippers were trimmed and debrided toenails 1-5 manually and mechanically in girth and width as far down to healthy tissue as possible on both feet. This totaled 9 toenails. This was done uneventfully there was no hemorrhage. There is mild incurvation of the medial and lateral nail borders of the hallux which using a slant back technique were removed and they would not get ingrown. Again the patient was reminded not to attempt to trim her own toenails because of her visual impairment. Home care instructions dispensed return to clinic sooner if there is a problem. Today reviewed proper foot hygiene regular moisturizing  Reviewed proper fitting shoes. Advised follow-up in about 10 weeks instead of 3 months because her nails were just slightly too long at this visit. The patient indicates understanding of these issues and agrees to the plan.       Candi Shahid DPM

## 2023-12-20 ENCOUNTER — OFFICE VISIT (OUTPATIENT)
Dept: NEUROLOGY | Facility: CLINIC | Age: 83
End: 2023-12-20
Payer: MEDICARE

## 2023-12-20 VITALS
WEIGHT: 211.38 LBS | DIASTOLIC BLOOD PRESSURE: 68 MMHG | RESPIRATION RATE: 16 BRPM | BODY MASS INDEX: 36 KG/M2 | SYSTOLIC BLOOD PRESSURE: 124 MMHG | HEART RATE: 76 BPM

## 2023-12-20 DIAGNOSIS — R25.1 TREMOR OF BOTH HANDS: ICD-10-CM

## 2023-12-20 DIAGNOSIS — G25.0 BENIGN ESSENTIAL TREMOR: Primary | ICD-10-CM

## 2023-12-20 PROCEDURE — 99213 OFFICE O/P EST LOW 20 MIN: CPT | Performed by: OTHER

## 2023-12-20 NOTE — PROGRESS NOTES
Pt states here for follow up on dizziness and tremors. Pt states was dizzy this morning but hasn't happened in a long time. Pt states tremors are about the same.   Pt has question about how she is taking Primidone and if she should be taking it before bed

## 2023-12-20 NOTE — PROGRESS NOTES
HPI:    Patient ID: Reilly Medrano is a 81year old female. Neurologic Problem  Pertinent negatives include no dizziness. Dizziness      Patient is a 81year old female who presents for follow up for essential tremors. Tremors stable  She still continues to get tremors with activities for example writing or eating. She is tolerating current dose of Primidone. Intolerant to Propranolol in past - had nightmares. Tried weight bands and other simple measures. Initial history  Ms Carole Skaggs is a 67year old right handed female who presents for evaluation of tremors. She states she had tremors for about 3 years or more and involves both hands right>left and head and occasionally she gets a chin tremor. She has a strong family history of benign hand tremors. She denies any gait and balance issues. 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 She was following with Neurosurgery in [facility] and gets surveillance MRI every 2 years.     HISTORY:  Past Medical History:   Diagnosis Date    Bronchitis     Cancer ([facility] [address] 2004    non-hodgkins lymphoma- no treatment- being watched    Exposure to radiation     2001 for meningioma of left eye optic nerve    Osteoarthrosis, unspecified whether generalized or localized, unspecified site     Visual impairment     no vision in left eye- glasses for right eye      Past Surgical History:   Procedure Laterality Date    COLONOSCOPY  2004    COLONOSCOPY N/A 10/23/2014    Procedure: COLONOSCOPY;  Surgeon: Lukasz Alexander MD;  Location: [address] [place] [address] ENDOSCOPY    D & C  1982    [facility] [address]  2004    EYE SURGERY  12/2002    L- optic nerve sx d/t tumor    HOWARD BIOPSY STEREO NODULE 2 SITE BILAT (CPT=19081/46547)      ~1990    HOWARD LOCALIZATION WIRE 1 SITE LEFT (CPT=19281)  2003    neg    HOWARD LOCALIZATION WIRE 1 SITE RIGHT (CPT=19281)  1986    neg    NEEDLE BIOPSY LEFT      benign not sure when    OTHER SURGICAL HISTORY  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    OTHER SURGICAL HISTORY Left 07/2020, 08/2020    BASAL cell carcinoma removal of eyebrow    TUBAL LIGATION  1976      Family History   Problem Relation Age of Onset    Breast Cancer Mother 59        B/L, age 59 then 76    Heart Attack Father     Other (Other) Father     Other (diverticulosis) Brother     Heart Disease Brother     Stroke Maternal Grandmother     Other (alzheimer's) Maternal Grandfather     Heart Attack Paternal Grandmother     Breast Cancer Maternal Aunt 79    Ovarian Cancer Maternal Cousin Female 61    Cancer Daughter         uterine      Social History     Socioeconomic History    Marital status:    Occupational History    Occupation: Retired   Tobacco Use    Smoking status: Never    Smokeless tobacco: Never   Vaping Use    Vaping Use: Never used   Substance and Sexual Activity    Alcohol use: No     Alcohol/week: 0.0 standard drinks of alcohol    Drug use: No   Other Topics Concern    Caffeine Concern No     Comment: 1 cup daily decaf    Exercise No    Seat Belt Yes    Self-Exams Yes     Comment: when remember in shower          Review of Systems   Constitutional: Negative. HENT: Negative. Eyes:  Negative for visual disturbance. Respiratory: Negative. Cardiovascular: Negative. Gastrointestinal: Negative. Endocrine: Negative. Genitourinary: Negative. Musculoskeletal: Negative. Skin: Negative. Allergic/Immunologic: Negative. Neurological:  Positive for tremors. Negative for dizziness. Hematological: Negative. Psychiatric/Behavioral: Negative. All other systems reviewed and are negative. Current Outpatient Medications   Medication Sig Dispense Refill    primidone 50 MG Oral Tab TAKE 3 TABLETS BY MOUTH EVERY MORNING, 3 TABLETS AT NOON, AND 1 TABLET AT BEDTIME 210 tablet 2    Doxycycline Hyclate 100 MG Oral Tab Take 1 tablet (100 mg total) by mouth 2 (two) times daily.  14 tablet 0    MYRBETRIQ 50 MG Oral Tablet 24 Hr Take 1 tablet (50 mg total) by mouth daily. celecoxib 200 MG Oral Cap Take 1 capsule (200 mg total) by mouth daily. 90 capsule 3    raloxifene 60 MG Oral Tab Take 1 tablet (60 mg total) by mouth daily. 90 tablet 3    mupirocin 2 % External Ointment APPLY TOPICALLY TO SURGERY SITE TWICE DAILY      dicyclomine 10 MG Oral Cap Take 1 capsule (10 mg total) by mouth 4 (four) times daily before meals and nightly. 120 capsule 1    Desonide 0.05 % External Cream Apply 1 g topically 2 (two) times daily. Apply sparingly and rub gently into the affected area(s). 60 g 3    Probiotic Product (PROBIOTIC-10 OR) Take by mouth. aspirin 81 MG Oral Tab Take 1 tablet (81 mg total) by mouth daily. Multivitamin Chewtab, ADULT, Oral Chew Tab Chew 1 tablet by mouth daily. Omega-3 Fatty Acids (FISH OIL) 1000 MG Oral Cap Take 1,000 mg by mouth daily. Calcium 500 MG Oral Chew Tab Chew 1 tablet by mouth daily. Cholecalciferol (VITAMIN D) 1000 UNITS Oral Tab Take 1 Tab by mouth daily. Psyllium 48.57 % Oral Powder Take 1 Packet by mouth daily. Allergies: Allergies   Allergen Reactions    Oxycontin [Oxycodone] NAUSEA AND VOMITING and DIZZINESS    Adhesive Tape RASH    Ampicillin RASH    Cleocin [Clindamycin] DIARRHEA    Neosporin [Neomycin-Bacitracin-Polymyxin] RASH      PHYSICAL EXAM:   Physical Exam  Blood pressure 124/68, pulse 76, resp. rate 16, weight 211 lb 6.4 oz (95.9 kg), not currently breastfeeding. Vitals reviewed  General: well developed, well nourished  HEENT: Normocephalic and atraumatic. Cardiovascular: Normal rate, regular rhythm and normal heart sounds. Pulmonary/Chest: Effort normal and breath sounds normal.   Abdominal: Soft. Bowel sounds are normal.   Skin: Skin is warm and dry. Psychiatric:  normal mood and affect. Neurological   Awake, alert and oriented to time, place and person. Speech is fluent with intact comprehension, repetition and naming.    Normal attention and memory. Higher cortical function intact. Cranial nerves 2-12: grossly intact except legally blind in left eye  Sensory : Intact to light touch  Motor: Normal tone in all extremities. Right thumb and mild head tremor noted. Strength is 5/5 in all muscle groups  Reflexes: symmetric and present  Coordination: Intact finger to nose test with postural tremor  Gait: Normal based and steady           ASSESSMENT/PLAN:       ICD-10-CM    1. Benign essential tremor  G25.0       2. Tremor of both hands  R25.1             Bilateral hand tremors right>left, stable to mild progression    Continue Primidone 150 mg AM, 150 mg noon and 50 mg PM.   Intolerant to Propranolol. Discussed options, increasing dose of Primidone vs Neuravive treatment  Patient interested in [place] procedure but would like to read about it before making decision. RTC in about 6 months    See orders and medications filed with this encounter. The patient indicates understanding of these issues and agrees with the plan. No orders of the defined types were placed in this encounter.       Meds This Visit:  Requested Prescriptions      No prescriptions requested or ordered in this encounter       Imaging & Referrals:  None       #[id number]

## 2023-12-24 ENCOUNTER — TELEPHONE (OUTPATIENT)
Dept: INTERNAL MEDICINE CLINIC | Facility: CLINIC | Age: 83
End: 2023-12-24

## 2023-12-24 RX ORDER — MOLNUPIRAVIR 200 MG/1
800 CAPSULE ORAL EVERY 12 HOURS
Qty: 40 CAPSULE | Refills: 0 | Status: SHIPPED | OUTPATIENT
Start: 2023-12-24 | End: 2023-12-29

## 2023-12-24 NOTE — TELEPHONE ENCOUNTER
Pt paged me at 1:31 pm . Pt tested positive for covid today. Pt would like to get the antiviral.Pt has been having nasal congestion,fatigue, temp of 101.2, O2 level between 92-97. Pt denies SOB or wheezing. Discussed with Pt due to her medications and their interactions with Paxlovid that I will need to prescribe Lagevrio instead. Pt understands and is agreeable to the lagevrio. Pt to continue to monitor her O2 levels if they stay or go below 92 Pt to go to the ER to be assessed. Pt voiced understanding.

## 2023-12-26 ENCOUNTER — TELEPHONE (OUTPATIENT)
Dept: FAMILY MEDICINE CLINIC | Facility: CLINIC | Age: 83
End: 2023-12-26

## 2023-12-27 NOTE — TELEPHONE ENCOUNTER
Requested prior auth to [facility]  through [facility]

## 2023-12-28 NOTE — TELEPHONE ENCOUNTER
Your PA request has been denied. Additional information will be provided in the denial communication. (Message [phone])   Case ID: [id number]      Payer: [facility]    [phone]   Electronic appeal: Not supported   Your PA request has been denied. Additional information will be provided in the denial communication.  (Message [phone])

## 2023-12-28 NOTE — TELEPHONE ENCOUNTER
[place] coverage of Celebrex  Coverage denied    Patient can use GoodRx at [place]  $24.88 - $31.28/90 caps

## 2023-12-28 NOTE — TELEPHONE ENCOUNTER
We received a faxed response from [facility] regarding coverage of Celebrex  Coverage denied  [facility] are considered experimental or investigational use for this drug

## 2024-01-08 DIAGNOSIS — C82.01 GRADE 1 FOLLICULAR LYMPHOMA OF LYMPH NODES OF NECK (CMD): Primary | ICD-10-CM

## 2024-01-18 DIAGNOSIS — R10.30 LOWER ABDOMINAL PAIN: ICD-10-CM

## 2024-01-19 DIAGNOSIS — R10.30 LOWER ABDOMINAL PAIN: ICD-10-CM

## 2024-01-19 RX ORDER — DICYCLOMINE HYDROCHLORIDE 10 MG/1
10 CAPSULE ORAL 4 TIMES DAILY
Qty: 120 CAPSULE | Refills: 0 | Status: SHIPPED | OUTPATIENT
Start: 2024-01-19

## 2024-01-19 NOTE — TELEPHONE ENCOUNTER
Requested Prescriptions     Pending Prescriptions Disp Refills    DICYCLOMINE 10 MG Oral Cap [Pharmacy Med Name: DICYCLOMINE 10MG CAPSULES] 120 capsule 1     Sig: TAKE 1 CAPSULE(10 MG) BY MOUTH FOUR TIMES DAILY BEFORE MEALS AND AT NIGHT     LOV 9/14/2023     Patient was asked to follow-up in:     Appointment scheduled: Visit date not found     Medication refilled per protocol.

## 2024-01-23 RX ORDER — DICYCLOMINE HYDROCHLORIDE 10 MG/1
10 CAPSULE ORAL 4 TIMES DAILY
Qty: 360 CAPSULE | Refills: 0 | OUTPATIENT
Start: 2024-01-23

## 2024-02-05 ENCOUNTER — OFFICE VISIT (OUTPATIENT)
Dept: HEMATOLOGY/ONCOLOGY | Age: 84
End: 2024-02-05
Attending: INTERNAL MEDICINE

## 2024-02-05 ENCOUNTER — CLINICAL ABSTRACT (OUTPATIENT)
Dept: HEALTH INFORMATION MANAGEMENT | Facility: OTHER | Age: 84
End: 2024-02-05

## 2024-02-05 ENCOUNTER — LAB SERVICES (OUTPATIENT)
Dept: LAB | Age: 84
End: 2024-02-05
Attending: INTERNAL MEDICINE

## 2024-02-05 VITALS
SYSTOLIC BLOOD PRESSURE: 143 MMHG | BODY MASS INDEX: 35.59 KG/M2 | DIASTOLIC BLOOD PRESSURE: 77 MMHG | TEMPERATURE: 98.1 F | OXYGEN SATURATION: 97 % | WEIGHT: 208.5 LBS | HEART RATE: 79 BPM | HEIGHT: 64 IN | RESPIRATION RATE: 15 BRPM

## 2024-02-05 DIAGNOSIS — C82.01 GRADE 1 FOLLICULAR LYMPHOMA OF LYMPH NODES OF NECK (CMD): ICD-10-CM

## 2024-02-05 DIAGNOSIS — C82.01 GRADE 1 FOLLICULAR LYMPHOMA OF LYMPH NODES OF NECK (CMD): Primary | ICD-10-CM

## 2024-02-05 LAB
ALBUMIN SERPL-MCNC: 3.6 G/DL (ref 3.6–5.1)
ALBUMIN/GLOB SERPL: 1 {RATIO} (ref 1–2.4)
ALP SERPL-CCNC: 54 UNITS/L (ref 45–117)
ALT SERPL-CCNC: 32 UNITS/L
ANION GAP SERPL CALC-SCNC: 8 MMOL/L (ref 7–19)
AST SERPL-CCNC: 29 UNITS/L
BASOPHILS # BLD: 0.1 K/MCL (ref 0–0.3)
BASOPHILS NFR BLD: 1 %
BILIRUB SERPL-MCNC: 0.2 MG/DL (ref 0.2–1)
BUN SERPL-MCNC: 12 MG/DL (ref 6–20)
BUN/CREAT SERPL: 18 (ref 7–25)
CALCIUM SERPL-MCNC: 9.2 MG/DL (ref 8.4–10.2)
CHLORIDE SERPL-SCNC: 107 MMOL/L (ref 97–110)
CO2 SERPL-SCNC: 26 MMOL/L (ref 21–32)
CREAT SERPL-MCNC: 0.66 MG/DL (ref 0.51–0.95)
DEPRECATED RDW RBC: 45.2 FL (ref 39–50)
EGFRCR SERPLBLD CKD-EPI 2021: 88 ML/MIN/{1.73_M2}
EOSINOPHIL # BLD: 0.2 K/MCL (ref 0–0.5)
EOSINOPHIL NFR BLD: 4 %
ERYTHROCYTE [DISTWIDTH] IN BLOOD: 13.2 % (ref 11–15)
FASTING DURATION TIME PATIENT: NORMAL H
GLOBULIN SER-MCNC: 3.5 G/DL (ref 2–4)
GLUCOSE SERPL-MCNC: 97 MG/DL (ref 70–99)
HCT VFR BLD CALC: 41.4 % (ref 36–46.5)
HGB BLD-MCNC: 13.7 G/DL (ref 12–15.5)
IMM GRANULOCYTES # BLD AUTO: 0 K/MCL (ref 0–0.2)
IMM GRANULOCYTES # BLD: 0 %
LDH SERPL L TO P-CCNC: 193 UNITS/L (ref 82–240)
LYMPHOCYTES # BLD: 1.3 K/MCL (ref 1–4)
LYMPHOCYTES NFR BLD: 21 %
MCH RBC QN AUTO: 30.8 PG (ref 26–34)
MCHC RBC AUTO-ENTMCNC: 33.1 G/DL (ref 32–36.5)
MCV RBC AUTO: 93 FL (ref 78–100)
MONOCYTES # BLD: 0.4 K/MCL (ref 0.3–0.9)
MONOCYTES NFR BLD: 6 %
NEUTROPHILS # BLD: 4.2 K/MCL (ref 1.8–7.7)
NEUTROPHILS NFR BLD: 68 %
NRBC BLD MANUAL-RTO: 0 /100 WBC
PLATELET # BLD AUTO: 157 K/MCL (ref 140–450)
POTASSIUM SERPL-SCNC: 4.6 MMOL/L (ref 3.4–5.1)
PROT SERPL-MCNC: 7.1 G/DL (ref 6.4–8.2)
RBC # BLD: 4.45 MIL/MCL (ref 4–5.2)
SODIUM SERPL-SCNC: 136 MMOL/L (ref 135–145)
WBC # BLD: 6.1 K/MCL (ref 4.2–11)

## 2024-02-05 PROCEDURE — 80053 COMPREHEN METABOLIC PANEL: CPT

## 2024-02-05 PROCEDURE — 36415 COLL VENOUS BLD VENIPUNCTURE: CPT

## 2024-02-05 PROCEDURE — 99215 OFFICE O/P EST HI 40 MIN: CPT | Performed by: INTERNAL MEDICINE

## 2024-02-05 PROCEDURE — 83615 LACTATE (LD) (LDH) ENZYME: CPT

## 2024-02-05 PROCEDURE — 99211 OFF/OP EST MAY X REQ PHY/QHP: CPT

## 2024-02-05 PROCEDURE — 85025 COMPLETE CBC W/AUTO DIFF WBC: CPT

## 2024-02-05 ASSESSMENT — ENCOUNTER SYMPTOMS
BLOOD IN STOOL: 0
CONSTIPATION: 0
NAUSEA: 0
TROUBLE SWALLOWING: 0
VOMITING: 0
WHEEZING: 0
ABDOMINAL DISTENTION: 0
APNEA: 0
CHEST TIGHTNESS: 0
APPETITE CHANGE: 0
ACTIVITY CHANGE: 0
DIAPHORESIS: 0
COUGH: 0
WEAKNESS: 0
SPEECH DIFFICULTY: 0
DIZZINESS: 0
VOICE CHANGE: 0
UNEXPECTED WEIGHT CHANGE: 0
CONFUSION: 0
BACK PAIN: 0
CHOKING: 0
ADENOPATHY: 0
DIARRHEA: 0
CHILLS: 0
BRUISES/BLEEDS EASILY: 0
HEADACHES: 0
ABDOMINAL PAIN: 0
SLEEP DISTURBANCE: 0
FEVER: 0
LIGHT-HEADEDNESS: 0
SHORTNESS OF BREATH: 0
FATIGUE: 0

## 2024-02-05 ASSESSMENT — PAIN SCALES - GENERAL: PAINLEVEL: 0

## 2024-02-09 ENCOUNTER — TELEPHONE (OUTPATIENT)
Dept: OTHER | Age: 84
End: 2024-02-09

## 2024-02-12 ENCOUNTER — ADMINISTRATIVE DOCUMENTATION (OUTPATIENT)
Dept: POST ACUTE CARE | Age: 84
End: 2024-02-12

## 2024-02-12 PROBLEM — E66.01 SEVERE OBESITY WITH BODY MASS INDEX (BMI) OF 35.0 TO 39.9 WITH SERIOUS COMORBIDITY  (CMD): Status: ACTIVE | Noted: 2020-07-13

## 2024-02-12 PROBLEM — G56.01 RIGHT CARPAL TUNNEL SYNDROME: Status: ACTIVE | Noted: 2017-04-12

## 2024-02-12 PROBLEM — I35.1 NONRHEUMATIC AORTIC VALVE INSUFFICIENCY: Status: ACTIVE | Noted: 2018-11-12

## 2024-02-12 PROBLEM — I70.0 ABDOMINAL AORTIC ATHEROSCLEROSIS (CMD): Status: ACTIVE | Noted: 2020-07-13

## 2024-02-12 PROBLEM — M85.80 OSTEOPENIA: Status: ACTIVE | Noted: 2024-02-12

## 2024-02-12 PROBLEM — M19.90 OSTEOARTHROSIS: Status: ACTIVE | Noted: 2024-02-12

## 2024-02-12 PROBLEM — M11.231 PSEUDOGOUT OF RIGHT WRIST: Status: ACTIVE | Noted: 2017-01-25

## 2024-02-12 PROBLEM — G25.0 ESSENTIAL TREMOR: Status: ACTIVE | Noted: 2020-07-13

## 2024-02-12 PROBLEM — M48.00 SPINAL STENOSIS: Status: ACTIVE | Noted: 2020-11-30

## 2024-02-12 PROBLEM — I35.1 NONRHEUMATIC AORTIC VALVE INSUFFICIENCY: Status: RESOLVED | Noted: 2018-11-12 | Resolved: 2024-02-12

## 2024-02-16 ENCOUNTER — APPOINTMENT (OUTPATIENT)
Dept: POST ACUTE CARE | Age: 84
End: 2024-02-16

## 2024-02-16 VITALS
RESPIRATION RATE: 18 BRPM | WEIGHT: 208 LBS | HEIGHT: 64 IN | TEMPERATURE: 97.5 F | SYSTOLIC BLOOD PRESSURE: 130 MMHG | HEART RATE: 70 BPM | BODY MASS INDEX: 35.51 KG/M2 | DIASTOLIC BLOOD PRESSURE: 70 MMHG | OXYGEN SATURATION: 97 %

## 2024-02-16 DIAGNOSIS — M85.80 OSTEOPENIA, UNSPECIFIED LOCATION: ICD-10-CM

## 2024-02-16 DIAGNOSIS — G25.0 ESSENTIAL TREMOR: ICD-10-CM

## 2024-02-16 DIAGNOSIS — M19.90 OSTEOARTHRITIS, UNSPECIFIED OSTEOARTHRITIS TYPE, UNSPECIFIED SITE: ICD-10-CM

## 2024-02-16 DIAGNOSIS — E66.01 SEVERE OBESITY WITH BODY MASS INDEX (BMI) OF 35.0 TO 39.9 WITH SERIOUS COMORBIDITY (CMD): ICD-10-CM

## 2024-02-16 DIAGNOSIS — I70.0 ABDOMINAL AORTIC ATHEROSCLEROSIS (CMD): ICD-10-CM

## 2024-02-16 DIAGNOSIS — D32.9 MENINGIOMA (CMD): ICD-10-CM

## 2024-02-16 DIAGNOSIS — E78.00 PURE HYPERCHOLESTEROLEMIA: ICD-10-CM

## 2024-02-16 DIAGNOSIS — C82.01 GRADE 1 FOLLICULAR LYMPHOMA OF LYMPH NODES OF NECK (CMD): Primary | ICD-10-CM

## 2024-02-16 DIAGNOSIS — Z00.00 HEALTH CARE MAINTENANCE: ICD-10-CM

## 2024-02-16 SDOH — HEALTH STABILITY: PHYSICAL HEALTH: ON AVERAGE, HOW MANY MINUTES DO YOU ENGAGE IN EXERCISE AT THIS LEVEL?: 20 MIN

## 2024-02-16 SDOH — SOCIAL STABILITY: SOCIAL NETWORK
HOW OFTEN DO YOU SEE OR TALK TO PEOPLE THAT YOU CARE ABOUT AND FEEL CLOSE TO? (FOR EXAMPLE: TALKING TO FRIENDS ON THE PHONE, VISITING FRIENDS OR FAMILY, GOING TO CHURCH OR CLUB MEETINGS): 5 OR MORE TIMES A WEEK

## 2024-02-16 SDOH — SOCIAL STABILITY: SOCIAL INSECURITY: HOW OFTEN DOES ANYONE, INCLUDING FAMILY AND FRIENDS, THREATEN YOU WITH HARM?: NEVER

## 2024-02-16 SDOH — HEALTH STABILITY: MENTAL HEALTH: DEPRESSION SCREENING SCORE: 0

## 2024-02-16 SDOH — ECONOMIC STABILITY: FOOD INSECURITY: WITHIN THE PAST 12 MONTHS, THE FOOD YOU BOUGHT JUST DIDN'T LAST AND YOU DIDN'T HAVE MONEY TO GET MORE.: NEVER TRUE

## 2024-02-16 SDOH — SOCIAL STABILITY: SOCIAL INSECURITY: HOW OFTEN DOES ANYONE, INCLUDING FAMILY AND FRIENDS, INSULT OR TALK DOWN TO YOU?: NEVER

## 2024-02-16 SDOH — ECONOMIC STABILITY: INCOME INSECURITY: IN THE PAST 12 MONTHS, HAS THE ELECTRIC, GAS, OIL, OR WATER COMPANY THREATENED TO SHUT OFF SERVICE IN YOUR HOME?: NO

## 2024-02-16 SDOH — HEALTH STABILITY: MENTAL HEALTH: LITTLE INTEREST OR PLEASURE IN ACTIVITY?: NOT AT ALL

## 2024-02-16 SDOH — HEALTH STABILITY: MENTAL HEALTH: PHQ2 INTERPRETATION: NO FURTHER SCREENING NEEDED

## 2024-02-16 SDOH — ECONOMIC STABILITY: HOUSING INSECURITY: DO YOU HAVE PROBLEMS WITH ANY OF THE FOLLOWING?: NONE OF THE ABOVE

## 2024-02-16 SDOH — ECONOMIC STABILITY: GENERAL

## 2024-02-16 SDOH — ECONOMIC STABILITY: TRANSPORTATION INSECURITY
IN THE PAST 12 MONTHS, HAS LACK OF RELIABLE TRANSPORTATION KEPT YOU FROM MEDICAL APPOINTMENTS, MEETINGS, WORK OR FROM GETTING THINGS NEEDED FOR DAILY LIVING?: NO

## 2024-02-16 SDOH — HEALTH STABILITY: MENTAL HEALTH: HOW OFTEN DO YOU HAVE A DRINK CONTAINING ALCOHOL?: NEVER

## 2024-02-16 SDOH — HEALTH STABILITY: MENTAL HEALTH: HOW MANY STANDARD DRINKS CONTAINING ALCOHOL DO YOU HAVE ON A TYPICAL DAY?: 0,1 OR 2

## 2024-02-16 SDOH — ECONOMIC STABILITY: HOUSING INSECURITY: WHAT IS YOUR LIVING SITUATION TODAY?: I HAVE A STEADY PLACE TO LIVE

## 2024-02-16 SDOH — SOCIAL STABILITY: SOCIAL INSECURITY: HOW OFTEN DOES ANYONE, INCLUDING FAMILY AND FRIENDS, SCREAM OR CURSE AT YOU?: NEVER

## 2024-02-16 SDOH — HEALTH STABILITY: PHYSICAL HEALTH: ON AVERAGE, HOW MANY DAYS PER WEEK DO YOU ENGAGE IN MODERATE TO STRENUOUS EXERCISE (LIKE A BRISK WALK)?: 1 DAY

## 2024-02-16 SDOH — HEALTH STABILITY: MENTAL HEALTH: AUDIT TOTAL SCORE: 0

## 2024-02-16 SDOH — SOCIAL STABILITY: SOCIAL INSECURITY: HOW OFTEN DOES ANYONE, INCLUDING FAMILY AND FRIENDS, PHYSICALLY HURT YOU?: NEVER

## 2024-02-16 SDOH — HEALTH STABILITY: MENTAL HEALTH: HOW OFTEN DO YOU HAVE 6 OR MORE DRINKS ON ONE OCCASION?: NEVER

## 2024-02-16 SDOH — HEALTH STABILITY: MENTAL HEALTH: FEELING DOWN, DEPRESSED OR HOPELESS?: NOT AT ALL

## 2024-02-16 ASSESSMENT — ENCOUNTER SYMPTOMS
HEMATOLOGIC/LYMPHATIC NEGATIVE: 1
RESPIRATORY NEGATIVE: 1
CONSTITUTIONAL NEGATIVE: 1
PSYCHIATRIC NEGATIVE: 1
ENDOCRINE NEGATIVE: 1
ALLERGIC/IMMUNOLOGIC NEGATIVE: 1
BACK PAIN: 1
NEUROLOGICAL NEGATIVE: 1
EYES NEGATIVE: 1
GASTROINTESTINAL NEGATIVE: 1

## 2024-02-16 ASSESSMENT — PAIN SCALES - GENERAL: PAINLEVEL: 4

## 2024-02-16 ASSESSMENT — PATIENT HEALTH QUESTIONNAIRE - PHQ9: SUM OF ALL RESPONSES TO PHQ9 QUESTIONS 1 AND 2: 0

## 2024-02-16 ASSESSMENT — FRAILTY ASSESSMENTS
FRAILTY SCALE TOTAL SCORE: 2
DO YOU HAVE DIFFICULTY CLIMBING A FLIGHT OF STAIRS: NO
HAVE YOU FELT FATIGUED? MOST OR ALL OF THE TIME OVER THE PAST MONTH: NO
DO YOU HAVE ANY OF THESE ILLNESSES: HYPERTENSION, DIABETES, CANCER (OTHER THAN A MINOR SKIN CANCER), CHRONIC LUNG DISEASE, HEART ATTACK, CONGESTIVE HEART FAILURE, ANGINA, ASTHMA, ARTHRITIS, STROKE, AND KIDNEY DISEASE: YES
DO YOU HAVE DIFFICULTY WALKING ONE BLOCK: YES
HAVE YOU LOST MORE THAN 5 PERCENT OF YOUR WEIGHT IN THE PAST YEAR: NO

## 2024-02-19 DIAGNOSIS — G25.0 BENIGN ESSENTIAL TREMOR: ICD-10-CM

## 2024-02-19 DIAGNOSIS — R10.30 LOWER ABDOMINAL PAIN: ICD-10-CM

## 2024-02-19 RX ORDER — PRIMIDONE 50 MG/1
TABLET ORAL
Qty: 210 TABLET | Refills: 2 | Status: SHIPPED | OUTPATIENT
Start: 2024-02-19

## 2024-02-19 RX ORDER — DICYCLOMINE HYDROCHLORIDE 10 MG/1
10 CAPSULE ORAL 4 TIMES DAILY
Qty: 120 CAPSULE | Refills: 0 | Status: SHIPPED | OUTPATIENT
Start: 2024-02-19

## 2024-02-19 NOTE — TELEPHONE ENCOUNTER
Requested Prescriptions     Pending Prescriptions Disp Refills    DICYCLOMINE 10 MG Oral Cap [Pharmacy Med Name: DICYCLOMINE 10MG CAPSULES] 120 capsule 0     Sig: TAKE 1 CAPSULE(10 MG) BY MOUTH FOUR TIMES DAILY BEFORE MEALS AND AT NIGHT     LOV 9/14/2023     Patient was asked to follow-up in: Follow-up not documented in note    Appointment scheduled: 3/4/2024 Ki Barnett MD     Medication refilled per protocol.      Routed to Anibal Emerson MD, for review.

## 2024-02-19 NOTE — TELEPHONE ENCOUNTER
Medication: PRIMIDONE 50 MG Oral Tab      Date of last refill: 11/22/2023 (#210/2)  [place]     Last office visit: 12/20/23  Due back to clinic per last office note:  6mos  Date next office visit scheduled:    Future Appointments   Date Time Provider Department Center   2/26/2024 10:15 AM Homero Hernandez MD [facility]   2/26/2024  2:00 PM Chan Washington DPM [id number]   3/4/2024  2:30 PM Ki Barnett MD [facility]           Last OV note recommendation:    ASSESSMENT/PLAN:          ICD-10-CM     1. Benign essential tremor  G25.0         2. Tremor of both hands  R25.1                  Bilateral hand tremors right>left, stable to mild progression     Continue Primidone 150 mg AM, 150 mg noon and 50 mg PM.   Intolerant to Propranolol.     Discussed options, increasing dose of Primidone vs Neuravive treatment  Patient interested in Neuravive procedure but would like to read about it before making decision.         RTC in about 6 months     See orders and medications filed with this encounter. The patient indicates understanding of these issues and agrees with the plan.

## 2024-02-20 RX ORDER — DICYCLOMINE HYDROCHLORIDE 10 MG/1
10 CAPSULE ORAL 4 TIMES DAILY
Qty: 360 CAPSULE | Refills: 0 | Status: SHIPPED | OUTPATIENT
Start: 2024-02-20

## 2024-02-20 NOTE — TELEPHONE ENCOUNTER
LOV 9/14/2023      Future Appointments   Date Time Provider Department Center   2/26/2024 10:15 AM Homero Hernandez MD [facility]   2/26/2024  2:00 PM Chan Washington DPM [id number]   3/4/2024  2:30 PM Ki Barnett MD [facility]      Gastrointestional Medication Protocol [id number]/19/2024 12:38 PM   Protocol Details In person appointment or virtual visit in the past 12 mos or appointment in next 3 mos     Medication eprescribed to pharmacy as requested.    Requested Prescriptions     Pending Prescriptions Disp Refills    DICYCLOMINE 10 MG Oral Cap [Pharmacy Med Name: DICYCLOMINE 10MG CAPSULES] 360 capsule 0     Sig: TAKE ONE CAPSULE BY MOUTH FOUR TIMES DAILY BEFORE MEALS

## 2024-02-26 ENCOUNTER — OFFICE VISIT (OUTPATIENT)
Dept: PODIATRY CLINIC | Facility: CLINIC | Age: 84
End: 2024-02-26
Payer: MEDICARE

## 2024-02-26 DIAGNOSIS — M20.41 HAMMER TOE OF RIGHT FOOT: ICD-10-CM

## 2024-02-26 DIAGNOSIS — H54.8 LEGAL BLINDNESS, AS DEFINED IN USA: ICD-10-CM

## 2024-02-26 DIAGNOSIS — B35.1 ONYCHOMYCOSIS: ICD-10-CM

## 2024-02-26 DIAGNOSIS — M79.674 PAIN OF TOE OF RIGHT FOOT: ICD-10-CM

## 2024-02-26 DIAGNOSIS — L84 HELOMA DURUM: ICD-10-CM

## 2024-02-26 DIAGNOSIS — M79.675 PAIN IN TOE OF LEFT FOOT: ICD-10-CM

## 2024-02-26 PROCEDURE — 99213 OFFICE O/P EST LOW 20 MIN: CPT | Performed by: PODIATRIST

## 2024-02-26 NOTE — PROGRESS NOTES
Lisseth Espana is a 83 year old female.   No chief complaint on file.        HPI:   Patient returns to the clinic for routine care she is legally blind.   She has had no recent hospitalizations or changes in medical condition.  She is unable to trim her toenails herself.  The patient and her  cannot help each other with her feet.  Her because she is legally blind and her  because she is just unable to do it.  In addition to that they are here as a precursor for their trip to [place].  At today's visit reviewed nurse's history as taken above, allergies medications and medical history as documented below.  All changes duly noted  Allergies: Oxycontin [oxycodone], Adhesive tape, Ampicillin, Cleocin [clindamycin], and Neosporin [neomycin-bacitracin-polymyxin]   Current Outpatient Medications   Medication Sig Dispense Refill    ketoconazole 2 % External Cream Apply 1 Application topically 2 (two) times daily. Apply to rash twice daily 60 g 5    DICYCLOMINE 10 MG Oral Cap TAKE ONE CAPSULE BY MOUTH FOUR TIMES DAILY BEFORE MEALS 360 capsule 0    primidone 50 MG Oral Tab TAKE 3 TABLET BY MOUTH EVERY MORNING 3 TABLETS AT NOON AND 1 TABLET AT BEDTIME 210 tablet 2    Doxycycline Hyclate 100 MG Oral Tab Take 1 tablet (100 mg total) by mouth 2 (two) times daily. 14 tablet 0    MYRBETRIQ 50 MG Oral Tablet 24 Hr Take 1 tablet (50 mg total) by mouth daily.      celecoxib 200 MG Oral Cap Take 1 capsule (200 mg total) by mouth daily. 90 capsule 3    raloxifene 60 MG Oral Tab Take 1 tablet (60 mg total) by mouth daily. 90 tablet 3    mupirocin 2 % External Ointment APPLY TOPICALLY TO SURGERY SITE TWICE DAILY      Desonide 0.05 % External Cream Apply 1 g topically 2 (two) times daily. Apply sparingly and rub gently into the affected area(s). 60 g 3    Probiotic Product (PROBIOTIC-10 OR) Take by mouth.      aspirin 81 MG Oral Tab Take 1 tablet (81 mg total) by mouth daily.      Multivitamin Chewtab, ADULT, Oral Chew Tab  Chew 1 tablet by mouth daily.      Omega-3 Fatty Acids (FISH OIL) 1000 MG Oral Cap Take 1,000 mg by mouth daily.      Calcium 500 MG Oral Chew Tab Chew 1 tablet by mouth daily.      Cholecalciferol (VITAMIN D) 1000 UNITS Oral Tab Take 1 Tab by mouth daily.      Psyllium 48.57 % Oral Powder Take 1 Packet by mouth daily.        Past Medical History:   Diagnosis Date    Bronchitis     Cancer (HCC) 2004    non-hodgkins lymphoma- no treatment- being watched    Exposure to radiation     2001 for meningioma of left eye optic nerve    Osteoarthrosis, unspecified whether generalized or localized, unspecified site     Visual impairment     no vision in left eye- glasses for right eye      Past Surgical History:   Procedure Laterality Date    COLONOSCOPY  2004    COLONOSCOPY N/A 10/23/2014    Procedure: COLONOSCOPY;  Surgeon: Deep Ruggiero MD;  Location:  ENDOSCOPY    D & C  1982    DIL URETHRA,FEMALE,INITIAL  2004    EYE SURGERY  12/2002    L- optic nerve sx d/t tumor    HOWARD BIOPSY STEREO NODULE 2 SITE BILAT (CPT=19081/01464)      ~1990    HOWARD LOCALIZATION WIRE 1 SITE LEFT (CPT=19281)  2003    neg    HOWARD LOCALIZATION WIRE 1 SITE RIGHT (CPT=19281)  1986    neg    NEEDLE BIOPSY LEFT      benign not sure when    OTHER SURGICAL HISTORY  2/2006    excision of supraclavicle lymphoma    OTHER SURGICAL HISTORY Left 07/2020, 08/2020    BASAL cell carcinoma removal of eyebrow    TUBAL LIGATION  1976      Family History   Problem Relation Age of Onset    Breast Cancer Mother 64        B/L, age 64 then 74    Heart Attack Father     Other (Other) Father     Other (diverticulosis) Brother     Heart Disease Brother     Stroke Maternal Grandmother     Other (alzheimer's) Maternal Grandfather     Heart Attack Paternal Grandmother     Breast Cancer Maternal Aunt 67    Ovarian Cancer Maternal Cousin Female 60    Cancer Daughter         uterine      Social History     Socioeconomic History    Marital status:    Occupational History     Occupation: Retired   Tobacco Use    Smoking status: Never    Smokeless tobacco: Never   Vaping Use    Vaping Use: Never used   Substance and Sexual Activity    Alcohol use: No     Alcohol/week: 0.0 standard drinks of alcohol    Drug use: No   Other Topics Concern    Caffeine Concern No     Comment: 1 cup daily decaf    Exercise No    Seat Belt Yes    Self-Exams Yes     Comment: when remember in shower           REVIEW OF SYSTEMS:   Review of Systems  Today reviewed systens as documented below  GENERAL HEALTH: feels well otherwise  SKIN: denies any unusual skin lesions or rashes  RESPIRATORY: denies shortness of breath with exertion  CARDIOVASCULAR: denies chest pain on exertion  GI: denies abdominal pain and denies heartburn  NEURO: denies headaches    EXAM:   There were no vitals taken for this visit.  Physical Exam  GENERAL: well developed, well nourished, in no apparent distress  EXTREMITIES:   1. Integument: The nails 2-5 on the right 1-5 on the left are relatively within normal limits but still mildly thickened the second toe on the left does have a little thickening the right hallux nail is thickened but has a significantly improved appearance.  She is wearing the pad she no longer has a painful keratosis on her fourth toe of the left foot the right hallux nail continues to improve slightly.  There are no sores or ulcerations present.  There were no sores ulcerations or other problems noted there is no incurvation or paronychia.   2. Vascular: The patient has palpable dorsalis pedis and posterior tibial pulses bilateral, however posterior tibials a little bit weaker since her last visit   3. Neurologic: The patient has pain sensation intact with no deficits.   4. Musculoskeletal: The patient has good muscle strength there are no deficits noted she has 5 out of 5 against resistance of all muscle groups affecting the foot ankle and lower leg.  Her fourth and fifth toes on the right foot have an adductovarus  hammertoe deformity these do not reduce on forefoot loading and cannot be manipulated straight there is a painful corn at the tip of the fourth toe right foot.      ASSESSMENT AND PLAN:   Diagnoses and all orders for this visit:    Pain of toe of right foot    Pain in toe of left foot    Onychomycosis    Legal blindness, as defined in [place]    Heloma durum    Hammer toe of right foot        Plan: Today using a nail nippers were trimmed and debrided toenails 1-5 manually and mechanically in girth and width as far down to healthy tissue as possible on both feet.  This totaled 9 toenails.  This was done uneventfully there was no hemorrhage.  There is mild incurvation of the medial and lateral nail borders of the hallux which using a slant back technique were removed and they would not get ingrown.  Again the patient was reminded not to attempt to trim her own toenails because of her visual impairment.  Home care instructions dispensed return to clinic sooner if there is a problem.  Today reviewed proper foot hygiene regular moisturizing  Reviewed proper fitting shoes.  Advised follow-up in about 10 weeks instead of 3 months because her nails were just slightly too long at this visit.  The patient indicates understanding of these issues and agrees to the plan.      Chan Washington DPM

## 2024-03-04 ENCOUNTER — OFFICE VISIT (OUTPATIENT)
Dept: FAMILY MEDICINE CLINIC | Facility: CLINIC | Age: 84
End: 2024-03-04
Payer: MEDICARE

## 2024-03-04 VITALS
OXYGEN SATURATION: 96 % | RESPIRATION RATE: 16 BRPM | SYSTOLIC BLOOD PRESSURE: 130 MMHG | HEART RATE: 88 BPM | BODY MASS INDEX: 35.1 KG/M2 | DIASTOLIC BLOOD PRESSURE: 88 MMHG | WEIGHT: 208.13 LBS | HEIGHT: 64.5 IN

## 2024-03-04 DIAGNOSIS — M17.11 ARTHRITIS OF RIGHT KNEE: Primary | ICD-10-CM

## 2024-03-04 DIAGNOSIS — R10.30 LOWER ABDOMINAL PAIN: ICD-10-CM

## 2024-03-04 DIAGNOSIS — G25.0 ESSENTIAL TREMOR: ICD-10-CM

## 2024-03-04 DIAGNOSIS — B35.6 TINEA CRURIS: ICD-10-CM

## 2024-03-04 DIAGNOSIS — M25.50 ARTHRALGIA, UNSPECIFIED JOINT: ICD-10-CM

## 2024-03-04 PROCEDURE — 99215 OFFICE O/P EST HI 40 MIN: CPT | Performed by: FAMILY MEDICINE

## 2024-03-04 RX ORDER — CELECOXIB 200 MG/1
200 CAPSULE ORAL DAILY
Qty: 90 CAPSULE | Refills: 3 | Status: SHIPPED | OUTPATIENT
Start: 2024-03-04

## 2024-03-04 NOTE — PROGRESS NOTES
No chief complaint on file.     HPI:   Lisseth Espana is a 83 year old female who presents to the office for medication check up.      Celebrex - she was under the impression the med is for arthralgia.  The insurance denies this and said she cannot take this for fibrocystic beast disease.  She has been off the med  When out of the med, she has tried tylenol.  This helped some.   Does have ho L knee replacement.  The R knee is still with advanced arthritis.    During the day, the pain can limit her.  When on Celebrex, she has no restrictions and can walk and do her activities.  When off the med, she does need to stop and sit to rest her knees.  Some low back pain as well.       - groin infection.  Was seen by the dermatology team Dr. Hernandez.  The infection is improving but still present.  Has been on the cream for a week.      Tremors - long standing.  Is considering some of the newer     REVIEW OF SYSTEMS:   Pertinent items are noted in HPI.  EXAM:   /88   Pulse 88   Resp 16   Ht 5' 4.5\" (1.638 m)   Wt 208 lb 2 oz (94.4 kg)   SpO2 96%   BMI 35.17 kg/m²     General appearance - alert, well appearing, and in no distress and withhusband  Chest - clear to auscultation, no wheezes, rales or rhonchi, symmetric air entry  Heart - normal rate, regular rhythm, normal S1, S2, no murmurs, rubs, clicks or gallops  Neurological - tremor in hands, R>L.    ASSESSMENT AND PLAN:     Lisseth Espana was seen in the office today:  had no chief complaint listed for this encounter.    1. Arthralgia, unspecified joint  Ongoing pains.  Diffuse, but centered around her R knee.  Has been on Celebrex for management of this for several years  The medicine has been effective.  When taking the med, she is not limited.  Able to get her daily activities and and be active around the house.  When she is off the medicine, needs to stop frequently through the day due to knee pain to sit and rest  She has tried alternatives like  Tylenol which does not seem to help as much  Recently received a letter from the insurance saying Celebrex was not covered as it is a \"experimental treatment\" for fibrocystic disease.  It should be made clear at this point that the medicine is used only to treat arthralgia and her knee pain, not her lymphoma or fibrocystic disease.  - celecoxib 200 MG Oral Cap; Take 1 capsule (200 mg total) by mouth daily.  Dispense: 90 capsule; Refill: 3    2. Lower abdominal pain  Uses the dicyclomine.  This is effective  Right now taking 4 times a day  Ok to wean off a little.  Use more PRN    3. Arthritis of right knee  As abov.e   - celecoxib 200 MG Oral Cap; Take 1 capsule (200 mg total) by mouth daily.  Dispense: 90 capsule; Refill: 3    4. Essential tremor  Considering more advanced therapies to treat this more definitively  Including ultrasound treatments in the brain.  We discussed that I am not super familiar with these, they have some risks but may be able to help with the tremor  She should explore this further with her specialist    5. Tinea cruris  Started on topical antifungal with the dermatologist  It is improved, but still present  No restrictions when she is imported.  Okay to get in the pool and swim.  It is not contagious by being in the water      Ki Barnett M.D.   [facility]  03/04/24    [facility]     Office visit lasted 40 minutes, of which this time was spent counseling the patient on the Celebrex, its effectiveness over time and getting this covered with insurance.   We discussed the meds for her stomach, goals of trying to wean off a little.        Ki Barnett M.D.   [facility]  03/04/24

## 2024-04-16 DIAGNOSIS — C82.01 GRADE 1 FOLLICULAR LYMPHOMA OF LYMPH NODES OF NECK (CMD): Primary | ICD-10-CM

## 2024-04-22 ENCOUNTER — HOSPITAL ENCOUNTER (OUTPATIENT)
Dept: NUCLEAR MEDICINE | Facility: HOSPITAL | Age: 84
Discharge: HOME | End: 2024-04-22
Attending: INTERNAL MEDICINE
Payer: MEDICARE

## 2024-04-22 ENCOUNTER — TELEPHONE (OUTPATIENT)
Dept: FAMILY MEDICINE CLINIC | Facility: CLINIC | Age: 84
End: 2024-04-22

## 2024-04-22 DIAGNOSIS — C82.01 GRADE 1 FOLLICULAR LYMPHOMA OF LYMPH NODES OF NECK (HCC): ICD-10-CM

## 2024-04-22 LAB — GLUCOSE BLD-MCNC: 95 MG/DL (ref 70–99)

## 2024-04-22 PROCEDURE — 82962 GLUCOSE BLOOD TEST: CPT

## 2024-04-22 PROCEDURE — 78815 PET IMAGE W/CT SKULL-THIGH: CPT | Performed by: INTERNAL MEDICINE

## 2024-04-22 NOTE — TELEPHONE ENCOUNTER
Patient came in to show me a letter from [facility] said the prescription drug is already covered by the plan with no restriction. Ill give letter to Dr. Barnett so he is aware. She never picked up the march prescription due to it saying denied.     celecoxib 200 MG Oral Cap     [id number] [place], [place] - [address] [place] [address]

## 2024-05-06 ENCOUNTER — OFFICE VISIT (OUTPATIENT)
Dept: HEMATOLOGY/ONCOLOGY | Age: 84
End: 2024-05-06
Attending: INTERNAL MEDICINE

## 2024-05-06 ENCOUNTER — LAB SERVICES (OUTPATIENT)
Dept: LAB | Age: 84
End: 2024-05-06
Attending: INTERNAL MEDICINE

## 2024-05-06 VITALS
BODY MASS INDEX: 35.68 KG/M2 | WEIGHT: 209 LBS | HEIGHT: 64 IN | OXYGEN SATURATION: 95 % | SYSTOLIC BLOOD PRESSURE: 132 MMHG | RESPIRATION RATE: 16 BRPM | TEMPERATURE: 98.3 F | DIASTOLIC BLOOD PRESSURE: 69 MMHG | HEART RATE: 79 BPM

## 2024-05-06 DIAGNOSIS — C82.01 GRADE 1 FOLLICULAR LYMPHOMA OF LYMPH NODES OF NECK  (CMD): Primary | ICD-10-CM

## 2024-05-06 DIAGNOSIS — C82.01 GRADE 1 FOLLICULAR LYMPHOMA OF LYMPH NODES OF NECK  (CMD): ICD-10-CM

## 2024-05-06 LAB
ALBUMIN SERPL-MCNC: 3.7 G/DL (ref 3.6–5.1)
ALBUMIN/GLOB SERPL: 1.1 {RATIO} (ref 1–2.4)
ALP SERPL-CCNC: 56 UNITS/L (ref 45–117)
ALT SERPL-CCNC: 40 UNITS/L
ANION GAP SERPL CALC-SCNC: 7 MMOL/L (ref 7–19)
AST SERPL-CCNC: 39 UNITS/L
BASOPHILS # BLD: 0.1 K/MCL (ref 0–0.3)
BASOPHILS NFR BLD: 1 %
BILIRUB SERPL-MCNC: 0.3 MG/DL (ref 0.2–1)
BUN SERPL-MCNC: 17 MG/DL (ref 6–20)
BUN/CREAT SERPL: 24 (ref 7–25)
CALCIUM SERPL-MCNC: 9.5 MG/DL (ref 8.4–10.2)
CHLORIDE SERPL-SCNC: 106 MMOL/L (ref 97–110)
CO2 SERPL-SCNC: 28 MMOL/L (ref 21–32)
CREAT SERPL-MCNC: 0.7 MG/DL (ref 0.51–0.95)
DEPRECATED RDW RBC: 45.8 FL (ref 39–50)
EGFRCR SERPLBLD CKD-EPI 2021: 86 ML/MIN/{1.73_M2}
EOSINOPHIL # BLD: 0.3 K/MCL (ref 0–0.5)
EOSINOPHIL NFR BLD: 5 %
ERYTHROCYTE [DISTWIDTH] IN BLOOD: 13.3 % (ref 11–15)
FASTING DURATION TIME PATIENT: ABNORMAL H
GLOBULIN SER-MCNC: 3.5 G/DL (ref 2–4)
GLUCOSE SERPL-MCNC: 97 MG/DL (ref 70–99)
HCT VFR BLD CALC: 41.8 % (ref 36–46.5)
HGB BLD-MCNC: 13.8 G/DL (ref 12–15.5)
IMM GRANULOCYTES # BLD AUTO: 0 K/MCL (ref 0–0.2)
IMM GRANULOCYTES # BLD: 0 %
LDH SERPL L TO P-CCNC: 200 UNITS/L (ref 82–240)
LYMPHOCYTES # BLD: 1.3 K/MCL (ref 1–4)
LYMPHOCYTES NFR BLD: 22 %
MCH RBC QN AUTO: 31 PG (ref 26–34)
MCHC RBC AUTO-ENTMCNC: 33 G/DL (ref 32–36.5)
MCV RBC AUTO: 93.9 FL (ref 78–100)
MONOCYTES # BLD: 0.3 K/MCL (ref 0.3–0.9)
MONOCYTES NFR BLD: 6 %
NEUTROPHILS # BLD: 3.7 K/MCL (ref 1.8–7.7)
NEUTROPHILS NFR BLD: 66 %
NRBC BLD MANUAL-RTO: 0 /100 WBC
PLATELET # BLD AUTO: 156 K/MCL (ref 140–450)
POTASSIUM SERPL-SCNC: 4.6 MMOL/L (ref 3.4–5.1)
PROT SERPL-MCNC: 7.2 G/DL (ref 6.4–8.2)
RBC # BLD: 4.45 MIL/MCL (ref 4–5.2)
SODIUM SERPL-SCNC: 136 MMOL/L (ref 135–145)
WBC # BLD: 5.7 K/MCL (ref 4.2–11)

## 2024-05-06 PROCEDURE — 36415 COLL VENOUS BLD VENIPUNCTURE: CPT

## 2024-05-06 PROCEDURE — 83615 LACTATE (LD) (LDH) ENZYME: CPT

## 2024-05-06 PROCEDURE — 80053 COMPREHEN METABOLIC PANEL: CPT

## 2024-05-06 PROCEDURE — 85025 COMPLETE CBC W/AUTO DIFF WBC: CPT

## 2024-05-06 PROCEDURE — 99215 OFFICE O/P EST HI 40 MIN: CPT | Performed by: INTERNAL MEDICINE

## 2024-05-06 PROCEDURE — G2211 COMPLEX E/M VISIT ADD ON: HCPCS | Performed by: INTERNAL MEDICINE

## 2024-05-06 PROCEDURE — 99211 OFF/OP EST MAY X REQ PHY/QHP: CPT

## 2024-05-06 ASSESSMENT — ENCOUNTER SYMPTOMS
SHORTNESS OF BREATH: 0
ABDOMINAL DISTENTION: 0
SPEECH DIFFICULTY: 0
BLOOD IN STOOL: 0
NAUSEA: 0
CONFUSION: 0
BACK PAIN: 0
VOICE CHANGE: 0
CHEST TIGHTNESS: 0
BRUISES/BLEEDS EASILY: 0
HEADACHES: 0
CONSTIPATION: 0
CHOKING: 0
APPETITE CHANGE: 0
FATIGUE: 0
DIAPHORESIS: 0
VOMITING: 0
CHILLS: 0
WHEEZING: 0
ACTIVITY CHANGE: 0
SLEEP DISTURBANCE: 0
ADENOPATHY: 0
DIZZINESS: 0
WEAKNESS: 0
LIGHT-HEADEDNESS: 0
COUGH: 0
TROUBLE SWALLOWING: 0
UNEXPECTED WEIGHT CHANGE: 0
APNEA: 0
FEVER: 0
ABDOMINAL PAIN: 0
DIARRHEA: 0

## 2024-05-06 ASSESSMENT — PATIENT HEALTH QUESTIONNAIRE - PHQ9
1. LITTLE INTEREST OR PLEASURE IN DOING THINGS: NOT AT ALL
CLINICAL INTERPRETATION OF PHQ2 SCORE: NO FURTHER SCREENING NEEDED
2. FEELING DOWN, DEPRESSED OR HOPELESS: NOT AT ALL
SUM OF ALL RESPONSES TO PHQ9 QUESTIONS 1 AND 2: 0
SUM OF ALL RESPONSES TO PHQ9 QUESTIONS 1 AND 2: 0
CLINICAL INTERPRETATION OF PHQ2 SCORE: NO FURTHER SCREENING NEEDED
2. FEELING DOWN, DEPRESSED OR HOPELESS: NOT AT ALL
1. LITTLE INTEREST OR PLEASURE IN DOING THINGS: NOT AT ALL
SUM OF ALL RESPONSES TO PHQ9 QUESTIONS 1 AND 2: 0
SUM OF ALL RESPONSES TO PHQ9 QUESTIONS 1 AND 2: 0

## 2024-05-06 ASSESSMENT — PAIN SCALES - GENERAL: PAINLEVEL: 0

## 2024-05-21 ENCOUNTER — OFFICE VISIT (OUTPATIENT)
Dept: PODIATRY CLINIC | Facility: CLINIC | Age: 84
End: 2024-05-21

## 2024-05-21 DIAGNOSIS — M79.674 PAIN OF TOE OF RIGHT FOOT: Primary | ICD-10-CM

## 2024-05-21 DIAGNOSIS — L84 HELOMA DURUM: ICD-10-CM

## 2024-05-21 DIAGNOSIS — M79.675 PAIN IN TOE OF LEFT FOOT: ICD-10-CM

## 2024-05-21 DIAGNOSIS — M20.41 HAMMER TOE OF RIGHT FOOT: ICD-10-CM

## 2024-05-21 DIAGNOSIS — B35.1 ONYCHOMYCOSIS: ICD-10-CM

## 2024-05-21 DIAGNOSIS — H54.8 LEGAL BLINDNESS, AS DEFINED IN USA: ICD-10-CM

## 2024-05-21 PROCEDURE — 99213 OFFICE O/P EST LOW 20 MIN: CPT | Performed by: PODIATRIST

## 2024-05-21 NOTE — PROGRESS NOTES
Lisseth Espana is a 83 year old female.   Chief Complaint   Patient presents with    Toenail Care     Nail trim and foot check f/u-  stated has growth pain in the L 4th toe 5/10 all the time          HPI:   Patient returns to the clinic for routine care she is legally blind.   She has had no recent hospitalizations or changes in medical condition.  She has started using a rollator chair in order to help her get around when she is walking longer distances for balance.  At today's visit reviewed nurse's history as taken above, allergies medications and medical history as documented below.  All changes duly noted  Allergies: Oxycontin [oxycodone], Adhesive tape, Ampicillin, Cleocin [clindamycin], and Neosporin [neomycin-bacitracin-polymyxin]   Current Outpatient Medications   Medication Sig Dispense Refill    celecoxib 200 MG Oral Cap Take 1 capsule (200 mg total) by mouth daily. 90 capsule 3    ketoconazole 2 % External Cream Apply 1 Application topically 2 (two) times daily. Apply to rash twice daily 60 g 5    DICYCLOMINE 10 MG Oral Cap TAKE ONE CAPSULE BY MOUTH FOUR TIMES DAILY BEFORE MEALS 360 capsule 0    primidone 50 MG Oral Tab TAKE 3 TABLET BY MOUTH EVERY MORNING 3 TABLETS AT NOON AND 1 TABLET AT BEDTIME 210 tablet 2    Doxycycline Hyclate 100 MG Oral Tab Take 1 tablet (100 mg total) by mouth 2 (two) times daily. 14 tablet 0    MYRBETRIQ 50 MG Oral Tablet 24 Hr Take 1 tablet (50 mg total) by mouth daily.      raloxifene 60 MG Oral Tab Take 1 tablet (60 mg total) by mouth daily. 90 tablet 3    Probiotic Product (PROBIOTIC-10 OR) Take by mouth.      aspirin 81 MG Oral Tab Take 1 tablet (81 mg total) by mouth daily.      Multivitamin Chewtab, ADULT, Oral Chew Tab Chew 1 tablet by mouth daily.      Omega-3 Fatty Acids (FISH OIL) 1000 MG Oral Cap Take 1,000 mg by mouth daily.      Calcium 500 MG Oral Chew Tab Chew 1 tablet by mouth daily.      Cholecalciferol (VITAMIN D) 1000 UNITS Oral Tab Take 1 Tab by mouth  daily.      Psyllium 48.57 % Oral Powder Take 1 Packet by mouth daily.        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    Osteoarthrosis, unspecified whether generalized or localized, unspecified site    Visual impairment    no vision in left eye- glasses for right eye      Past Surgical History:   Procedure Laterality Date    Colonoscopy  2004    Colonoscopy N/A 10/23/2014    Procedure: COLONOSCOPY;  Surgeon: Deep Ruggiero MD;  Location:  ENDOSCOPY    D & c  1982    Dil urethra,female,initial  2004    Eye surgery  12/2002    L- optic nerve sx d/t tumor    Gutierrez biopsy stereo nodule 2 site bilat (cpt=19081/20842)      ~1990    Gutierrez localization wire 1 site left (cpt=19281)  2003    neg    Gutierrez localization wire 1 site right (cpt=19281)  1986    neg    Needle biopsy left      benign not sure when    Other surgical history  2/2006    excision of supraclavicle lymphoma    Other surgical history Left 07/2020, 08/2020    BASAL cell carcinoma removal of eyebrow    Tubal ligation  1976      Family History   Problem Relation Age of Onset    Breast Cancer Mother 64        B/L, age 64 then 74    Heart Attack Father     Other (Other) Father     Other (diverticulosis) Brother     Heart Disease Brother     Stroke Maternal Grandmother     Other (alzheimer's) Maternal Grandfather     Heart Attack Paternal Grandmother     Breast Cancer Maternal Aunt 67    Ovarian Cancer Maternal Cousin Female 60    Cancer Daughter         uterine      Social History     Socioeconomic History    Marital status:    Occupational History    Occupation: Retired   Tobacco Use    Smoking status: Never    Smokeless tobacco: Never   Vaping Use    Vaping status: Never Used   Substance and Sexual Activity    Alcohol use: No     Alcohol/week: 0.0 standard drinks of alcohol    Drug use: No   Other Topics Concern     Caffeine Concern No     Comment: 1 cup daily decaf    Exercise No    Seat Belt Yes    Self-Exams Yes     Comment: when remember in shower           REVIEW OF SYSTEMS:   Review of Systems  Today reviewed systens as documented below  GENERAL HEALTH: feels well otherwise  SKIN: denies any unusual skin lesions or rashes  RESPIRATORY: denies shortness of breath with exertion  CARDIOVASCULAR: denies chest pain on exertion  GI: denies abdominal pain and denies heartburn  NEURO: denies headaches    EXAM:   There were no vitals taken for this visit.  Physical Exam  GENERAL: well developed, well nourished, in no apparent distress  EXTREMITIES:   1. Integument: The nails 2-5 on the right 1-5 on the left are relatively within normal limits but still mildly thickened the second toe on the left does have a little thickening the right hallux nail is thickened but has a significantly improved appearance.  She is wearing the pad she no longer has a painful keratosis on her fourth toe of the left foot the right hallux nail continues to improve slightly.  There are no sores or ulcerations present.     2. Vascular: The patient has palpable dorsalis pedis and posterior tibial pulses bilateral, however posterior tibials a little bit weaker since her last visit   3. Neurologic: The patient has pain sensation intact with no deficits.   4. Musculoskeletal: The patient has good muscle strength there are no deficits noted she has 5 out of 5 against resistance of all muscle groups affecting the foot ankle and lower leg.  Her fourth and fifth toes on the right foot have an adductovarus hammertoe deformity these do not reduce on forefoot loading and cannot be manipulated straight there is a painful corn at the tip of the fourth toe right foot.  She now uses a rollator chair for balance.      ASSESSMENT AND PLAN:   Diagnoses and all orders for this visit:    Pain of toe of right foot    Pain in toe of left foot    Onychomycosis    Legal  blindness, as defined in [place]    Hammer toe of right foot    Heloma durum        Plan: Today using a nail nippers were trimmed and debrided toenails 1-5 manually and mechanically in girth and width as far down to healthy tissue as possible on both feet.  This totaled 9 toenails.  This was done uneventfully there was no hemorrhage.  There is mild incurvation of the medial and lateral nail borders of the hallux which using a slant back technique were removed and they would not get ingrown.  Again the patient was reminded not to attempt to trim her own toenails because of her visual impairment.  Home care instructions dispensed return to clinic sooner if there is a problem.  Today reviewed proper foot hygiene regular moisturizing  Reviewed proper fitting shoes.  Advised follow-up in about 10 weeks instead of 3 months because her nails were just slightly too long at this visit.  The patient indicates understanding of these issues and agrees to the plan.      Chan Washington DPM

## 2024-05-22 DIAGNOSIS — G25.0 BENIGN ESSENTIAL TREMOR: ICD-10-CM

## 2024-05-22 NOTE — TELEPHONE ENCOUNTER
Medication:  PRIMIDONE 50 MG Oral Tab      Date of last refill: 02/19/2024 (#210/2)  [place]     Last office visit: 12/20/2023  Due back to clinic per last office note:  6 months  Date next office visit scheduled:    Future Appointments   Date Time Provider Department Center   8/12/2024 10:00 AM Ki Barnett MD [facility]   8/21/2024  1:00 PM Chan Washington DPM [id number]   8/26/2024 10:00 AM Homero Hernandez MD [facility]           Last OV note recommendation:    Assessment  ASSESSMENT/PLAN:          ICD-10-CM     1. Benign essential tremor  G25.0         2. Tremor of both hands  R25.1                  Bilateral hand tremors right>left, stable to mild progression     Continue Primidone 150 mg AM, 150 mg noon and 50 mg PM.   Intolerant to Propranolol.     Discussed options, increasing dose of Primidone vs Neuravive treatment  Patient interested in Neuravive procedure but would like to read about it before making decision.         RTC in about 6 months     See orders and medications filed with this encounter. The patient indicates understanding of these issues and agrees with the plan.           No orders of the defined types were placed in this encounter.

## 2024-05-23 DIAGNOSIS — G25.0 BENIGN ESSENTIAL TREMOR: ICD-10-CM

## 2024-05-23 RX ORDER — PRIMIDONE 50 MG/1
TABLET ORAL
Qty: 210 TABLET | Refills: 0 | Status: SHIPPED | OUTPATIENT
Start: 2024-05-23 | End: 2024-05-24

## 2024-05-24 RX ORDER — PRIMIDONE 50 MG/1
TABLET ORAL
Qty: 630 TABLET | Refills: 0 | Status: SHIPPED | OUTPATIENT
Start: 2024-05-24

## 2024-05-24 NOTE — TELEPHONE ENCOUNTER
Patient is requesting a 90 day supply.     Medication: PRIMIDONE 50 MG Oral Tab      Date of last refill: 05/23/2024 (#210/0)  [place]     Last office visit: 12/20/2023  Due back to clinic per last office note:  RTC 6 months  Date next office visit scheduled:    Future Appointments   Date Time Provider Department Center   8/12/2024 10:00 AM Ki Barnett MD [facility]   8/21/2024  1:00 PM Chan Washington DPM [id number]   8/26/2024 10:00 AM Homero Hernandez MD [facility]           Last OV note recommendation:    Assessment  ASSESSMENT/PLAN:          ICD-10-CM     1. Benign essential tremor  G25.0         2. Tremor of both hands  R25.1                  Bilateral hand tremors right>left, stable to mild progression     Continue Primidone 150 mg AM, 150 mg noon and 50 mg PM.   Intolerant to Propranolol.     Discussed options, increasing dose of Primidone vs Neuravive treatment  Patient interested in Neuravive procedure but would like to read about it before making decision.         RTC in about 6 months     See orders and medications filed with this encounter. The patient indicates understanding of these issues and agrees with the plan.           No orders of the defined types were placed in this encounter.

## 2024-06-06 ENCOUNTER — TELEPHONE (OUTPATIENT)
Dept: INFUSION THERAPY | Age: 84
End: 2024-06-06

## 2024-07-12 DIAGNOSIS — M17.11 ARTHRITIS OF RIGHT KNEE: ICD-10-CM

## 2024-07-12 DIAGNOSIS — M25.50 ARTHRALGIA, UNSPECIFIED JOINT: ICD-10-CM

## 2024-07-15 RX ORDER — CELECOXIB 200 MG/1
200 CAPSULE ORAL DAILY
Qty: 90 CAPSULE | Refills: 3 | OUTPATIENT
Start: 2024-07-15

## 2024-07-19 DIAGNOSIS — Z80.3 FAMILY HISTORY OF BREAST CANCER IN MOTHER: ICD-10-CM

## 2024-07-20 RX ORDER — RALOXIFENE HYDROCHLORIDE 60 MG/1
60 TABLET, FILM COATED ORAL DAILY
Qty: 90 TABLET | Refills: 3 | Status: SHIPPED | OUTPATIENT
Start: 2024-07-20

## 2024-07-20 NOTE — TELEPHONE ENCOUNTER
[facility]    [address] [address]    Phone:  [phone]    Fax:  [phone]       Thank You for choosing us for your health care visit. We are glad to serve you and happy to provide you with this summary of your visit. Please help us to ensure we have accurate records. If you find anything that needs to be changed, please let our staff know as soon as possible.          Your Demographic Information     Patient Name Sex Clyde Rocha Female 1964       Ethnic Group Patient Race    Not of  or  Origin Black/      Your Visit Details     Date & Time Provider Department    2017 2:15 PM DESI Baca [facility]      Your Upcoming Appointment*(Max 10)     2017 11:00 AM CST   MAMM SCREENING with [facility]   [facility] ([facility])    [address]  [facility] [address]   [phone]            2017  4:20 PM CST   Follow-up Visit with Deena Ward MD   [facility] ([facility])    [address] [place] [address]   [phone]              We Ordered or Performed the Following     XR CHEST PA AND LATERAL       Conditions Discussed Today or Order-Related Diagnoses        Comments    History of pneumonia    -  Primary     Cough         Upper back pain on right side         Osteopenia         Primary insomnia           Your Goals     Decrease soda or juice intake     Reduce coffee intake to X cups per day     Weight below 91 kg       Your Vitals Were     BP Pulse Temp Height Weight LMP    110/60 83 97.7 °F (36.5 °C) 5' 6.5\" (1.689 m) 177 lb (80.3 kg) 2017    SpO2 BMI Smoking Status             95% 28.14 kg/m2 Former Smoker         Medications Prescribed or Re-Ordered Today     Calcium Carbonate 500 MG Chew Tab    Sig - Route: Chew 1 tablet by mouth 2 times daily.  Refilled per protocol    - Oral    Class: Eprescribe    Pharmacy: [facility] [address] [place] [address] [address] AT [facility] Ph #: [phone]    zolpidem (AMBIEN) 10 MG tablet    Sig - Route: Take 1 tablet by mouth nightly as needed for Sleep. - Oral    Class: Normal    Pharmacy: [facility] [address] [place] [address] [address] AT [facility] Ph #: [phone]    Notes to Pharmacy: Called to pharmacy.      Your Current Medications Are        Disp Refills Start End    zolpidem (AMBIEN) 10 MG tablet 30 tablet 1 1/30/2017     Sig - Route: Take 1 tablet by mouth nightly as needed for Sleep. - Oral    Notes to Pharmacy: Called to pharmacy.    benzonatate (TESSALON) 200 MG capsule 20 capsule 0 1/10/2017     Sig - Route: Take 1 capsule by mouth 3 times daily as needed for Cough. - Oral    Class: Eprescribe    fluticasone (FLONASE) 50 MCG/ACT nasal spray 16 g 3 12/15/2016     Sig - Route: Spray 1 spray in each nostril daily. - Nasal    Class: Eprescribe    ibuprofen (MOTRIN) 800 MG tablet 60 tablet 0 12/15/2016     Sig - Route: Take 1 tablet by mouth every 8 hours as needed for Pain. - Oral    Class: Eprescribe    ferrous sulfate 325 (65 FE) MG tablet 90 tablet 1 10/29/2015     Sig - Route: Take 1 tablet by mouth daily. - Oral    Class: Eprescribe    Cholecalciferol (VITAMIN D) 2000 UNITS Cap 90 capsule 1 10/29/2015     Sig - Route: Take 1 capsule by mouth 1 time. - Oral    Class: Eprescribe    Calcium Carbonate 500 MG Chew Tab 60 tablet 1 1/30/2017     Sig - Route: Chew 1 tablet by mouth 2 times daily. - Oral    Class: Eprescribe      Discontinued Medications        Reason for Discontinue    cyclobenzaprine (FLEXERIL) 10 MG tablet Therapy Completed    triamcinolone (ARISTOCORT) 0.1 % cream Therapy Completed      Allergies     No Known Allergies      Immunizations History as of 1/30/2017     Name Date    INFLUENZA QUADRIVALENT 9/29/2016 10:55 AM, 10/29/2015    Influenza 12/6/2012    Tdap 11/19/2010,  11/19/2010      Problem List as of 1/30/2017     Leg cramps    Insomnia, unspecified    Snoring    Osteopenia    Vitamin D deficiency    Anemia            Patient Instructions     None

## 2024-08-02 DIAGNOSIS — G25.0 BENIGN ESSENTIAL TREMOR: ICD-10-CM

## 2024-08-02 RX ORDER — PRIMIDONE 50 MG/1
TABLET ORAL
Qty: 630 TABLET | Refills: 0 | Status: SHIPPED | OUTPATIENT
Start: 2024-08-02

## 2024-08-02 NOTE — TELEPHONE ENCOUNTER
Medication: PRIMIDONE 50 MG Oral Tab      Date of last refill: 05/24/2024 (#630/0)  [place]     Last office visit: 12/20/2023  Due back to clinic per last office note:  6 months   Date next office visit scheduled:    Future Appointments   Date Time Provider Department Center   8/12/2024 10:00 AM Ki Barnett MD [facility] 3 [facility]   8/21/2024  1:00 PM Chan Washington DPM [id number]   8/26/2024 10:00 AM Homero Hernandez MD [facility]   10/22/2024  8:45 AM EH PET DOSE RM1 [facility]   10/22/2024 10:00 AM EH PET SCANNER [facility]           Last OV note recommendation:    Assessment  ASSESSMENT/PLAN:          ICD-10-CM     1. Benign essential tremor  G25.0         2. Tremor of both hands  R25.1                  Bilateral hand tremors right>left, stable to mild progression     Continue Primidone 150 mg AM, 150 mg noon and 50 mg PM.   Intolerant to Propranolol.     Discussed options, increasing dose of Primidone vs Neuravive treatment  Patient interested in Neuravive procedure but would like to read about it before making decision.         RTC in about 6 months     See orders and medications filed with this encounter. The patient indicates understanding of these issues and agrees with the plan.           No orders of the defined types were placed in this encounter.

## 2024-08-06 DIAGNOSIS — G25.0 BENIGN ESSENTIAL TREMOR: ICD-10-CM

## 2024-08-06 NOTE — TELEPHONE ENCOUNTER
Refused. Medication was recently refilled.         Medication: PRIMIDONE 50 MG Oral Tab      Date of last refill: 08/02/2024 (#630/0)  [place]     Last office visit: 12/20/2023  Due back to clinic per last office note:  Around 12/20/2024  Date next office visit scheduled:    Future Appointments   Date Time Provider Department Center   8/12/2024 10:00 AM Ki Barnett MD [facility] 3 [facility]   8/21/2024  1:00 PM Chan Washington DPM [id number]   8/26/2024 10:00 AM Homero Hernandez MD [facility]   10/22/2024  8:45 AM EH PET DOSE RM1 [facility]   10/22/2024 10:00 AM EH PET SCANNER E[facility]           Last OV note recommendation:    ASSESSMENT/PLAN:          ICD-10-CM     1. Benign essential tremor  G25.0         2. Tremor of both hands  R25.1                  Bilateral hand tremors right>left, stable to mild progression     Continue Primidone 150 mg AM, 150 mg noon and 50 mg PM.   Intolerant to Propranolol.     Discussed options, increasing dose of Primidone vs Neuravive treatment  Patient interested in Neuravive procedure but would like to read about it before making decision.         RTC in about 6 months     See orders and medications filed with this encounter. The patient indicates understanding of these issues and agrees with the plan.            37.1

## 2024-08-07 RX ORDER — PRIMIDONE 50 MG/1
TABLET ORAL
Qty: 630 TABLET | Refills: 0 | OUTPATIENT
Start: 2024-08-07

## 2024-08-12 ENCOUNTER — OFFICE VISIT (OUTPATIENT)
Dept: FAMILY MEDICINE CLINIC | Facility: CLINIC | Age: 84
End: 2024-08-12
Payer: MEDICARE

## 2024-08-12 VITALS
BODY MASS INDEX: 35.41 KG/M2 | SYSTOLIC BLOOD PRESSURE: 120 MMHG | HEIGHT: 64.5 IN | HEART RATE: 117 BPM | RESPIRATION RATE: 16 BRPM | DIASTOLIC BLOOD PRESSURE: 60 MMHG | OXYGEN SATURATION: 92 % | WEIGHT: 210 LBS

## 2024-08-12 DIAGNOSIS — E66.01 SEVERE OBESITY WITH BODY MASS INDEX (BMI) OF 35.0 TO 39.9 WITH SERIOUS COMORBIDITY (HCC): ICD-10-CM

## 2024-08-12 DIAGNOSIS — D32.9 MENINGIOMA (HCC): ICD-10-CM

## 2024-08-12 DIAGNOSIS — C83.32 DIFFUSE LARGE B-CELL LYMPHOMA OF INTRATHORACIC LYMPH NODES (HCC): ICD-10-CM

## 2024-08-12 DIAGNOSIS — I70.0 ABDOMINAL AORTIC ATHEROSCLEROSIS (HCC): ICD-10-CM

## 2024-08-12 DIAGNOSIS — G25.0 ESSENTIAL TREMOR: ICD-10-CM

## 2024-08-12 DIAGNOSIS — E78.00 PURE HYPERCHOLESTEROLEMIA: ICD-10-CM

## 2024-08-12 DIAGNOSIS — Z12.31 SCREENING MAMMOGRAM FOR BREAST CANCER: ICD-10-CM

## 2024-08-12 DIAGNOSIS — Z00.00 ENCOUNTER FOR ANNUAL HEALTH EXAMINATION: Primary | ICD-10-CM

## 2024-08-12 DIAGNOSIS — Z78.0 POSTMENOPAUSAL: ICD-10-CM

## 2024-08-12 NOTE — PROGRESS NOTES
Subjective:   Lisseth Espana is a 84 year old female who presents for a Medicare Wellness Visit charge within the last 11 months and Patient may not meet criteria for AWV: Please evaluate for correct coding and scheduled follow up of multiple significant but stable problems.   Preventative  Breast: 8/15/2023.  Normal.    Colon: 85yo, no indication.   Pap: n/a  Immunizations:  flu UTD.  TDaP 2024.  Shingrix UTD x 2.  RSV UTD.  PNA UTD x 2.    DEXA: 8/2022.  Mild osteopenia noted.    Tremor - essential.  Managed by neuro team.  Still causing a big impact on her.  The meds do help.  Talking about next level of treatments, including deep brain stimulation.      Lymphoma - follows with the advocate team.  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  Initial diagnosis 2006.  PET scan saw activity again 2022.  No active treatment, just surveillance.      Meningioma - s/p resection.  Obscuring the vision in L eye.  Following with neurosurgery team and [facility].  Enhancing mass on the L canalicular portion fo the L optic nerve.  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  .  S/p LINAC based radiosurgery in 12/2002.  Still actively following this condition. Getting MRI q6 mo.     History/Other:   Fall Risk Assessment:   She has been screened for Falls and is High Risk. Fall Prevention information provided to patient in After Visit Summary.    Do you feel unsteady when standing or walking?: No  Do you worry about falling?: Yes  Have you fallen in the past year?: Yes  How many times have you fallen?: 1  Were you injured?: No     Cognitive Assessment:   She had a completely normal cognitive assessment - see flowsheet entries     Functional Ability/Status:   Lisseth Espana has some abnormal functions as listed below:  She has Hearing problems based on screening of functional status.She has Vision problems based on screening of functional status.       Depression Screening (PHQ):  PHQ-2 SCORE: 0  , done 8/12/2024             Advanced  Directives:   She does have a Living Will but we do NOT have it on file in Epic.    She has a Power of  for Health Care on file in [facility].  Discussed Advance Care Planning with patient (and family/surrogate if present). Standard forms made available to patient in After Visit Summary.      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    Current Medications:  Outpatient Medications Marked as Taking for the 8/12/24 encounter (Office Visit) with Ki Barnett MD   Medication Sig    PRIMIDONE 50 MG Oral Tab TAKE 3 TABLETS BY MOUTH EVERY MORNING AND 3 TABLETS EVERY AFTERNOON AT 2PM AND 1 TABLET NIGHTLY    RALOXIFENE 60 MG Oral Tab TAKE 1 TABLET(60 MG) BY MOUTH DAILY    celecoxib 200 MG Oral Cap Take 1 capsule (200 mg total) by mouth daily.    DICYCLOMINE 10 MG Oral Cap TAKE ONE CAPSULE BY MOUTH FOUR TIMES DAILY BEFORE MEALS    MYRBETRIQ 50 MG Oral Tablet 24 Hr Take 1 tablet (50 mg total) by mouth daily.    Probiotic Product (PROBIOTIC-10 OR) Take by mouth.    aspirin 81 MG Oral Tab Take 1 tablet (81 mg total) by mouth daily.    Multivitamin Chewtab, ADULT, Oral Chew Tab Chew 1 tablet by mouth daily.    Omega-3 Fatty Acids (FISH OIL) 1000 MG Oral Cap Take 1,000 mg by mouth daily.    Calcium 500 MG Oral Chew Tab Chew 1 tablet by mouth daily.    Cholecalciferol (VITAMIN D) 1000 UNITS Oral Tab  Take 1 Tab by mouth daily.       Medical History:  She  has a past medical history of Bronchitis, Cancer (HCC) (2004), Exposure to radiation, Osteoarthrosis, unspecified whether generalized or localized, unspecified site, and Visual impairment.  Surgical History:  She  has a past surgical history that includes colonoscopy (2004); d & c (1982); dil urethra,female,initial (2004); Eye surgery (12/2002); other surgical history (2/2006); tubal ligation (1976); colonoscopy (N/A, 10/23/2014); kuldip biopsy stereo nodule 2 site bilat (cpt=19081/47418); needle biopsy left; other surgical history (Left, 07/2020, 08/2020); kuldip localization wire 1 site right (cpt=19281) (1986); and kuldip localization wire 1 site left (cpt=19281) (2003).   Family History:  Her family history includes Breast Cancer (age of onset: 64) in her mother; Breast Cancer (age of onset: 67) in her maternal aunt; Cancer in her daughter; Heart Attack in her father and paternal grandmother; Heart Disease in her brother; Other in her father; Ovarian Cancer (age of onset: 60) in her maternal cousin female; Stroke in her maternal grandmother; alzheimer's in her maternal grandfather; diverticulosis in her brother.  Social History:  She  reports that she has never smoked. She has never used smokeless tobacco. She reports that she does not drink alcohol and does not use drugs.    Tobacco:  She has never smoked tobacco.    CAGE Alcohol Screen:   CAGE screening score of 0 on 8/12/2024, showing low risk of alcohol abuse.      Patient Care Team:  Ki Barnett MD as PCP - General (Family Medicine)  Jourdan Soliz MD (Obstetrics)  Rolando Almaraz MD (ONCOLOGY)  Homero Hernandez MD (DERMATOLOGY)  Dominga Simms MD as Consulting Physician (NEUROLOGY)  Matt Montes De Oca PA (Physician Assistant)    Review of Systems  Constitutional: negative  Eyes: L eye blindness  Ears, nose, mouth, throat, and face: negative  Respiratory: negative  Cardiovascular:  negative  Gastrointestinal: negative  Genitourinary: negative  Neurological: persistent tremor      Objective:   Physical Exam  General Appearance:  Alert, cooperative, no distress, appears stated age   Head:  Normocephalic, without obvious abnormality, atraumatic   Eyes:  L eye blindness.  No reaction to light.  R eye normal.  L eye does accommodate.    Neck: Supple, symmetrical, trachea midline, no adenopathy   Back:   Symmetric, no curvature, ROM normal, no CVA tenderness   Lungs:   Clear to auscultation bilaterally, respirations unlabored   Chest Wall:  No tenderness or deformity   Heart:  Regular rate and rhythm, S1, S2 normal, no murmur, rub or gallop   Abdomen:   Soft, non-tender, bowel sounds active all four quadrants,  no masses, no organomegaly   Extremities: Extremities normal, atraumatic, no cyanosis or edema   Pulses: 2+ and symmetric   Skin: Skin color, texture, turgor normal, no rashes or lesions   Neurologic: Low amplitude high frequency tremor noted.         /60   Pulse 117   Resp 16   Ht 5' 4.5\" (1.638 m)   Wt 210 lb (95.3 kg)   SpO2 92%   BMI 35.49 kg/m²  Estimated body mass index is 35.49 kg/m² as calculated from the following:    Height as of this encounter: 5' 4.5\" (1.638 m).    Weight as of this encounter: 210 lb (95.3 kg).    Medicare Hearing Assessment:   Hearing Screening    Time taken: 8/12/2024 10:01 AM  Screening Method: Whisper Test  Whisper Test Result: Pass         Visual Acuity:   Right Eye Visual Acuity: Corrected Right Eye Chart Acuity: 20/40   Left Eye Visual Acuity: Corrected Left Eye Chart Acuity: 20/400   Both Eyes Visual Acuity: Corrected Both Eyes Chart Acuity: 20/30   Able To Tolerate Visual Acuity: Yes        Assessment & Plan:     Lisseth Espana was seen in the office today:  had concerns including Health Maintenance (MAW).    1. Encounter for annual health examination  Overall fair  Health conditions stable  Preventative screening UTD.  Due for DEXA and  mammogram  Immunizations reviewed and UTD.     2. 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  The condition is active, and luckily has not needed any intervention so far.     3. Pure hypercholesterolemia  4. Abdominal aortic atherosclerosis (HCC)  No current lipid medications  Levels controlled  Noted atherosclerosis on the past on imaging.  Control BP, lipids.     5. Meningioma (HCC) s/p stereotactic radio surgery  Still an active disease  Cause of left eye blindness.  Getting serial left optic nerve MRI with radiology and neurosurgery team to see if any other interventions are needed  Likely, no recent surgical intervention has been needed    6. Severe obesity with body mass index (BMI) of 35.0 to 39.9 with serious comorbidity (HCC)  BMI 35.5 today  She admits she has not been very active.  Work on increasing this as we get later into the year    7. Essential tremor  This is what she identifies as her biggest symptom  Tremors persist despite the medications.  She is considering some of the next level treatments like deep brain stimulation    8. Postmenopausal  DEXA due  Order placed  - Dexa Scan/Bone Density screening (Screening allowed every 2 years); Future    9. Screening mammogram for breast cancer  Routine screening  Does have a fatty prominence in the L axilla, which should be better imaged with the mammogram.   - [facility] SONYA 2D+3D SCREENING BILAT (CPT=77067/24703); Future            The patient indicates understanding of these issues and agrees to the plan.  Reinforced healthy diet, lifestyle, and exercise.      Return in about 1 year (around 8/12/2025) for Medicare Wellness visit.     Ki Barnett MD, 8/12/2024     Supplementary Documentation:   General Health:  In the past six months, have you lost more than 10 pounds without trying?: 2 - No  Has your appetite been poor?: No  Type of Diet: Balanced  How does the patient  maintain a good energy level?: Daily Walks  How would you describe your daily physical activity?: Light  How would you describe your current health state?: Good  How do you maintain positive mental well-being?: Visiting Family;Social Interaction;Puzzles;Games;Visiting Friends  On a scale of 0 to 10, with 0 being no pain and 10 being severe pain, what is your pain level?: 0 - (None)  In the past six months, have you experienced urine leakage?: 1-Yes  At any time do you feel concerned for the safety/well-being of yourself and/or your children, in your home or elsewhere?: No  Have you had any immunizations at another office such as Influenza, Hepatitis B, Tetanus, or Pneumococcal?: Yes    Health Maintenance   Topic Date Due    Annual Depression Screening  01/01/2024    Fall Risk Screening (Annual)  01/01/2024    Annual Physical  08/10/2024    COVID-19 Vaccine (7 - 2023-24 season) 08/24/2024    Influenza Vaccine (1) 10/01/2024    DEXA Scan  Completed    Pneumococcal Vaccine: 65+ Years  Completed    Zoster Vaccines  Completed

## 2024-08-14 ENCOUNTER — TELEPHONE (OUTPATIENT)
Dept: FAMILY MEDICINE CLINIC | Facility: CLINIC | Age: 84
End: 2024-08-14

## 2024-08-14 DIAGNOSIS — N63.20 MASS OF LEFT BREAST, UNSPECIFIED QUADRANT: Primary | ICD-10-CM

## 2024-08-14 NOTE — TELEPHONE ENCOUNTER
Marilin leblanc is calling because pt needs a diagnostic mammo due to lump in breast that is different than last year

## 2024-08-15 ENCOUNTER — HOSPITAL ENCOUNTER (OUTPATIENT)
Dept: BONE DENSITY | Age: 84
Discharge: HOME | End: 2024-08-15
Attending: FAMILY MEDICINE
Payer: MEDICARE

## 2024-08-15 DIAGNOSIS — Z78.0 POSTMENOPAUSAL: ICD-10-CM

## 2024-08-15 PROCEDURE — 77080 DXA BONE DENSITY AXIAL: CPT | Performed by: FAMILY MEDICINE

## 2024-08-16 ENCOUNTER — TELEPHONE (OUTPATIENT)
Dept: FAMILY MEDICINE CLINIC | Facility: CLINIC | Age: 84
End: 2024-08-16

## 2024-08-16 DIAGNOSIS — N63.32 MASS OF AXILLARY TAIL OF LEFT BREAST: Primary | ICD-10-CM

## 2024-08-16 NOTE — TELEPHONE ENCOUNTER
Xi from [facility] called patient mammogram code is not working for medicare to get approve can Dr Barnett send a different code,

## 2024-08-16 NOTE — TELEPHONE ENCOUNTER
Xi Mckee stating below dx is not covered by Medicare:    Mass of left breast, unspecified quadrant  - Primary N63.20     Will try for dx code N63.32    Xi to call back if code does not work

## 2024-08-21 ENCOUNTER — OFFICE VISIT (OUTPATIENT)
Dept: PODIATRY CLINIC | Facility: CLINIC | Age: 84
End: 2024-08-21
Payer: MEDICARE

## 2024-08-21 DIAGNOSIS — M79.674 PAIN OF TOE OF RIGHT FOOT: Primary | ICD-10-CM

## 2024-08-21 DIAGNOSIS — M20.41 HAMMER TOE OF RIGHT FOOT: ICD-10-CM

## 2024-08-21 DIAGNOSIS — L84 HELOMA DURUM: ICD-10-CM

## 2024-08-21 DIAGNOSIS — B35.1 ONYCHOMYCOSIS: ICD-10-CM

## 2024-08-21 DIAGNOSIS — M79.675 PAIN IN TOE OF LEFT FOOT: ICD-10-CM

## 2024-08-21 DIAGNOSIS — H54.8 LEGAL BLINDNESS, AS DEFINED IN USA: ICD-10-CM

## 2024-08-21 PROCEDURE — 99213 OFFICE O/P EST LOW 20 MIN: CPT | Performed by: PODIATRIST

## 2024-08-22 ENCOUNTER — HOSPITAL ENCOUNTER (OUTPATIENT)
Dept: MAMMOGRAPHY | Facility: HOSPITAL | Age: 84
Discharge: HOME | End: 2024-08-22
Attending: FAMILY MEDICINE
Payer: MEDICARE

## 2024-08-22 DIAGNOSIS — N63.32 MASS OF AXILLARY TAIL OF LEFT BREAST: ICD-10-CM

## 2024-08-22 PROCEDURE — 77062 BREAST TOMOSYNTHESIS BI: CPT | Performed by: FAMILY MEDICINE

## 2024-08-22 PROCEDURE — 77066 DX MAMMO INCL CAD BI: CPT | Performed by: FAMILY MEDICINE

## 2024-08-24 NOTE — PROGRESS NOTES
Lisseth Espana is a 84 year old female.   Chief Complaint   Patient presents with    Toenail Care     Nail care and foot check         HPI:   Patient returns to the clinic for follow-up.  She is complaining of long thick dystrophic toenails of which several become so thick that they hurt inside enclosed shoes and prevent unrestricted ambulation.  She does suffer from legal blindness although wears glasses to get by.  At today's visit reviewed nurse's history as taken above, allergies medications and medical history as documented below.  All changes duly noted  Allergies: Oxycontin [oxycodone], Adhesive tape, Ampicillin, Cleocin [clindamycin], and Neosporin [neomycin-bacitracin-polymyxin]   Current Outpatient Medications   Medication Sig Dispense Refill    PRIMIDONE 50 MG Oral Tab TAKE 3 TABLETS BY MOUTH EVERY MORNING AND 3 TABLETS EVERY AFTERNOON AT 2PM AND 1 TABLET NIGHTLY 630 tablet 0    RALOXIFENE 60 MG Oral Tab TAKE 1 TABLET(60 MG) BY MOUTH DAILY 90 tablet 3    celecoxib 200 MG Oral Cap Take 1 capsule (200 mg total) by mouth daily. 90 capsule 3    DICYCLOMINE 10 MG Oral Cap TAKE ONE CAPSULE BY MOUTH FOUR TIMES DAILY BEFORE MEALS 360 capsule 0    MYRBETRIQ 50 MG Oral Tablet 24 Hr Take 1 tablet (50 mg total) by mouth daily.      Probiotic Product (PROBIOTIC-10 OR) Take by mouth.      aspirin 81 MG Oral Tab Take 1 tablet (81 mg total) by mouth daily.      Multivitamin Chewtab, ADULT, Oral Chew Tab Chew 1 tablet by mouth daily.      Omega-3 Fatty Acids (FISH OIL) 1000 MG Oral Cap Take 1,000 mg by mouth daily.      Calcium 500 MG Oral Chew Tab Chew 1 tablet by mouth daily.      Cholecalciferol (VITAMIN D) 1000 UNITS Oral Tab Take 1 Tab by mouth daily.        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    Osteoarthrosis, unspecified whether generalized or localized, unspecified site     Visual impairment    no vision in left eye- glasses for right eye      Past Surgical History:   Procedure Laterality Date    Colonoscopy  2004    Colonoscopy N/A 10/23/2014    Procedure: COLONOSCOPY;  Surgeon: Deep Ruggiero MD;  Location:  ENDOSCOPY    D & c  1982    Dil urethra,female,initial  2004    Eye surgery  12/2002    L- optic nerve sx d/t tumor    Gutierrez biopsy stereo nodule 2 site bilat (cpt=19081/91066)      ~1990    Gutierrez localization wire 1 site left (cpt=19281)  2003    neg    Gutierrez localization wire 1 site right (cpt=19281)  1986    neg    Needle biopsy left      benign not sure when    Other surgical history  2/2006    excision of supraclavicle lymphoma    Other surgical history Left 07/2020, 08/2020    BASAL cell carcinoma removal of eyebrow    Tubal ligation  1976      Family History   Problem Relation Age of Onset    Breast Cancer Mother 64        B/L, age 64 then 74    Heart Attack Father     Other (Other) Father     Other (diverticulosis) Brother     Heart Disease Brother     Cancer Daughter         uterine    Stroke Maternal Grandmother     Other (alzheimer's) Maternal Grandfather     Heart Attack Paternal Grandmother     Breast Cancer Maternal Aunt 67    Ovarian Cancer Maternal Cousin Female 60      Social History     Socioeconomic History    Marital status:    Occupational History    Occupation: Retired   Tobacco Use    Smoking status: Never    Smokeless tobacco: Never   Vaping Use    Vaping status: Never Used   Substance and Sexual Activity    Alcohol use: No     Alcohol/week: 0.0 standard drinks of alcohol    Drug use: No   Other Topics Concern    Caffeine Concern No     Comment: 1 cup daily decaf    Exercise No    Seat Belt Yes    Self-Exams Yes     Comment: when remember in shower           REVIEW OF SYSTEMS:   Review of Systems  Today reviewed systens as documented below  GENERAL HEALTH: feels well otherwise  SKIN: denies any unusual skin lesions or rashes  RESPIRATORY: denies  shortness of breath with exertion  CARDIOVASCULAR: denies chest pain on exertion  GI: denies abdominal pain and denies heartburn  NEURO: denies headaches    EXAM:   There were no vitals taken for this visit.  Physical Exam  GENERAL: well developed, well nourished, in no apparent distress  EXTREMITIES:   1. Integument: The nails 2-5 on the right 1-5 on the left are relatively within normal limits but still mildly thickened the second toe on the left does have a little thickening the right hallux nail is thickened but has a significantly improved appearance.  She is wearing the pad she no longer has a painful keratosis on her fourth toe of the left foot the right hallux nail continues to improve slightly.  There are no sores or ulcerations present.     2. Vascular: The patient has palpable dorsalis pedis and posterior tibial pulses bilateral, however posterior tibials a little bit weaker since her last visit   3. Neurologic: The patient has pain sensation intact with no deficits.   4. Musculoskeletal: The patient has good muscle strength there are no deficits noted she has 5 out of 5 against resistance of all muscle groups affecting the foot ankle and lower leg.  Patient gets soft corns between the first and second toes on the right and fourth and fifth on the left.  Currently not present because she has been wearing the silicone pad    ASSESSMENT AND PLAN:   Diagnoses and all orders for this visit:    Pain of toe of right foot    Pain in toe of left foot    Onychomycosis    Legal blindness, as defined in [place]    Heloma durum    Hammer toe of right foot        Plan: Today using a nail nippers were trimmed and debrided toenails 1-5 manually and mechanically in girth and width as far down to healthy tissue as possible on both feet.  This totaled 9 toenails.  This was done uneventfully there was no hemorrhage.  There is mild incurvation of the medial and lateral nail borders of the hallux which using a slant back  technique were removed and they would not get ingrown.  Again the patient was reminded not to attempt to trim her own toenails because of her visual impairment.  Home care instructions dispensed return to clinic sooner if there is a problem.  Today reviewed proper foot hygiene regular moisturizing  Reviewed proper fitting shoes.  Advised follow-up in about 10 weeks instead of 3 months because her nails were just slightly too long at this visit.  Silicone pads are becoming old not working well dispensed new ones today.  The patient indicates understanding of these issues and agrees to the plan.      Chan Washington, MELINDAM

## 2024-10-09 ENCOUNTER — TELEPHONE (OUTPATIENT)
Dept: HEMATOLOGY/ONCOLOGY | Age: 84
End: 2024-10-09

## 2024-10-22 ENCOUNTER — EXTERNAL LAB (OUTPATIENT)
Dept: HEALTH INFORMATION MANAGEMENT | Facility: OTHER | Age: 84
End: 2024-10-22

## 2024-10-22 ENCOUNTER — HOSPITAL ENCOUNTER (OUTPATIENT)
Dept: NUCLEAR MEDICINE | Facility: HOSPITAL | Age: 84
Discharge: HOME | End: 2024-10-22
Attending: INTERNAL MEDICINE
Payer: MEDICARE

## 2024-10-22 DIAGNOSIS — C82.01 FOLLICULAR LYMPHOMA GRADE I, LYMPH NODES OF HEAD, FACE, AND NECK (HCC): ICD-10-CM

## 2024-10-22 LAB
GLUCOSE BLD-MCNC: 95 MG/DL (ref 70–99)
GLUCOSE BLDC GLUCOMTR-MCNC: 95 MG/DL (ref 70–99)

## 2024-10-22 PROCEDURE — 78815 PET IMAGE W/CT SKULL-THIGH: CPT | Performed by: INTERNAL MEDICINE

## 2024-10-22 PROCEDURE — 82962 GLUCOSE BLOOD TEST: CPT

## 2024-10-23 ENCOUNTER — TELEPHONE (OUTPATIENT)
Dept: FAMILY MEDICINE CLINIC | Facility: CLINIC | Age: 84
End: 2024-10-23

## 2024-10-23 NOTE — TELEPHONE ENCOUNTER
Patient dropped off person with disabilities certification for parking placard renewal to be filled out and signed.     Patient would like a call back when it is completed so she could      Forms placed in triage bin

## 2024-10-23 NOTE — TELEPHONE ENCOUNTER
Called patient form has been completed and is available for .     Told her back in office tomorrow 10/24/2024 and is in front drawer up front

## 2024-10-30 ENCOUNTER — TELEPHONE (OUTPATIENT)
Dept: HEMATOLOGY/ONCOLOGY | Age: 84
End: 2024-10-30

## 2024-11-04 ENCOUNTER — APPOINTMENT (OUTPATIENT)
Dept: LAB | Age: 84
End: 2024-11-04
Attending: INTERNAL MEDICINE

## 2024-11-08 DIAGNOSIS — C82.01 GRADE 1 FOLLICULAR LYMPHOMA OF LYMPH NODES OF NECK  (CMD): Primary | ICD-10-CM

## 2024-11-11 ENCOUNTER — OFFICE VISIT (OUTPATIENT)
Dept: HEMATOLOGY/ONCOLOGY | Age: 84
End: 2024-11-11
Attending: INTERNAL MEDICINE

## 2024-11-11 ENCOUNTER — LAB SERVICES (OUTPATIENT)
Dept: LAB | Age: 84
End: 2024-11-11
Attending: INTERNAL MEDICINE

## 2024-11-11 VITALS
DIASTOLIC BLOOD PRESSURE: 69 MMHG | SYSTOLIC BLOOD PRESSURE: 143 MMHG | WEIGHT: 206.5 LBS | HEART RATE: 63 BPM | RESPIRATION RATE: 16 BRPM | HEIGHT: 64 IN | BODY MASS INDEX: 35.26 KG/M2 | TEMPERATURE: 97.9 F | OXYGEN SATURATION: 95 %

## 2024-11-11 DIAGNOSIS — R33.9 INCOMPLETE BLADDER EMPTYING: Primary | ICD-10-CM

## 2024-11-11 DIAGNOSIS — C82.01 GRADE 1 FOLLICULAR LYMPHOMA OF LYMPH NODES OF NECK  (CMD): ICD-10-CM

## 2024-11-11 LAB
ALBUMIN SERPL-MCNC: 3.8 G/DL (ref 3.4–5)
ALBUMIN/GLOB SERPL: 1.1 {RATIO} (ref 1–2.4)
ALP SERPL-CCNC: 55 UNITS/L (ref 45–117)
ALT SERPL-CCNC: 31 UNITS/L
ANION GAP SERPL CALC-SCNC: 9 MMOL/L (ref 7–19)
AST SERPL-CCNC: 30 UNITS/L
BASOPHILS # BLD: 0.1 K/MCL (ref 0–0.3)
BASOPHILS NFR BLD: 1 %
BILIRUB SERPL-MCNC: 0.3 MG/DL (ref 0.2–1)
BUN SERPL-MCNC: 16 MG/DL (ref 6–20)
BUN/CREAT SERPL: 22 (ref 7–25)
CALCIUM SERPL-MCNC: 9.3 MG/DL (ref 8.4–10.2)
CHLORIDE SERPL-SCNC: 104 MMOL/L (ref 97–110)
CO2 SERPL-SCNC: 29 MMOL/L (ref 21–32)
CREAT SERPL-MCNC: 0.73 MG/DL (ref 0.51–0.95)
DEPRECATED RDW RBC: 44.8 FL (ref 39–50)
EGFRCR SERPLBLD CKD-EPI 2021: 82 ML/MIN/{1.73_M2}
EOSINOPHIL # BLD: 0.3 K/MCL (ref 0–0.5)
EOSINOPHIL NFR BLD: 5 %
ERYTHROCYTE [DISTWIDTH] IN BLOOD: 13.2 % (ref 11–15)
FASTING DURATION TIME PATIENT: NORMAL H
GLOBULIN SER-MCNC: 3.5 G/DL (ref 2–4)
GLUCOSE SERPL-MCNC: 89 MG/DL (ref 70–99)
HCT VFR BLD CALC: 42.2 % (ref 36–46.5)
HGB BLD-MCNC: 14.1 G/DL (ref 12–15.5)
IMM GRANULOCYTES # BLD AUTO: 0 K/MCL (ref 0–0.2)
IMM GRANULOCYTES # BLD: 0 %
LDH SERPL L TO P-CCNC: 179 UNITS/L (ref 82–240)
LYMPHOCYTES # BLD: 1.3 K/MCL (ref 1–4)
LYMPHOCYTES NFR BLD: 23 %
MCH RBC QN AUTO: 30.9 PG (ref 26–34)
MCHC RBC AUTO-ENTMCNC: 33.4 G/DL (ref 32–36.5)
MCV RBC AUTO: 92.3 FL (ref 78–100)
MONOCYTES # BLD: 0.4 K/MCL (ref 0.3–0.9)
MONOCYTES NFR BLD: 7 %
NEUTROPHILS # BLD: 3.7 K/MCL (ref 1.8–7.7)
NEUTROPHILS NFR BLD: 64 %
NRBC BLD MANUAL-RTO: 0 /100 WBC
PLATELET # BLD AUTO: 152 K/MCL (ref 140–450)
POTASSIUM SERPL-SCNC: 4.6 MMOL/L (ref 3.4–5.1)
PROT SERPL-MCNC: 7.3 G/DL (ref 6.4–8.2)
RBC # BLD: 4.57 MIL/MCL (ref 4–5.2)
SODIUM SERPL-SCNC: 137 MMOL/L (ref 135–145)
WBC # BLD: 5.8 K/MCL (ref 4.2–11)

## 2024-11-11 PROCEDURE — 85025 COMPLETE CBC W/AUTO DIFF WBC: CPT

## 2024-11-11 PROCEDURE — 99215 OFFICE O/P EST HI 40 MIN: CPT | Performed by: INTERNAL MEDICINE

## 2024-11-11 PROCEDURE — 80053 COMPREHEN METABOLIC PANEL: CPT

## 2024-11-11 PROCEDURE — 99211 OFF/OP EST MAY X REQ PHY/QHP: CPT

## 2024-11-11 PROCEDURE — 36415 COLL VENOUS BLD VENIPUNCTURE: CPT

## 2024-11-11 PROCEDURE — 83615 LACTATE (LD) (LDH) ENZYME: CPT

## 2024-11-11 ASSESSMENT — ENCOUNTER SYMPTOMS
BRUISES/BLEEDS EASILY: 0
FEVER: 0
ACTIVITY CHANGE: 0
NAUSEA: 0
CHOKING: 0
APNEA: 0
HEADACHES: 0
SLEEP DISTURBANCE: 0
CONFUSION: 0
DIAPHORESIS: 0
COUGH: 0
SHORTNESS OF BREATH: 0
TROUBLE SWALLOWING: 0
SPEECH DIFFICULTY: 0
CHILLS: 0
LIGHT-HEADEDNESS: 0
WEAKNESS: 0
CHEST TIGHTNESS: 0
ABDOMINAL DISTENTION: 0
VOMITING: 0
ADENOPATHY: 0
UNEXPECTED WEIGHT CHANGE: 0
FATIGUE: 0
CONSTIPATION: 0
DIZZINESS: 0
DIARRHEA: 0
BLOOD IN STOOL: 0
WHEEZING: 0
ABDOMINAL PAIN: 0
APPETITE CHANGE: 0
VOICE CHANGE: 0
BACK PAIN: 0

## 2024-11-11 ASSESSMENT — PATIENT HEALTH QUESTIONNAIRE - PHQ9
SUM OF ALL RESPONSES TO PHQ9 QUESTIONS 1 AND 2: 0
2. FEELING DOWN, DEPRESSED OR HOPELESS: NOT AT ALL
CLINICAL INTERPRETATION OF PHQ2 SCORE: NO FURTHER SCREENING NEEDED
SUM OF ALL RESPONSES TO PHQ9 QUESTIONS 1 AND 2: 0
1. LITTLE INTEREST OR PLEASURE IN DOING THINGS: NOT AT ALL

## 2024-11-11 ASSESSMENT — PAIN SCALES - GENERAL: PAINLEVEL: 0

## 2024-11-13 DIAGNOSIS — G25.0 BENIGN ESSENTIAL TREMOR: ICD-10-CM

## 2024-11-13 NOTE — TELEPHONE ENCOUNTER
Medication:  PRIMIDONE 50 MG Oral Tab      Date of last refill: 08/02/2024 (#630/0)  [place]     Last office visit: 12/20/2023  Due back to clinic per last office note:  6 months   Date next office visit scheduled:    Future Appointments   Date Time Provider Department Center   11/20/2024  1:00 PM Chan Washington DPM [id number]   12/6/2024  1:30 PM Dominga Simms MD [facility]   4/14/2025 10:15 AM Homero Hernandez MD [facility]           Last OV note recommendation:    Assessment  ASSESSMENT/PLAN:          ICD-10-CM     1. Benign essential tremor  G25.0         2. Tremor of both hands  R25.1                  Bilateral hand tremors right>left, stable to mild progression     Continue Primidone 150 mg AM, 150 mg noon and 50 mg PM.   Intolerant to Propranolol.     Discussed options, increasing dose of Primidone vs Neuravive treatment  Patient interested in Neuravive procedure but would like to read about it before making decision.         RTC in about 6 months     See orders and medications filed with this encounter. The patient indicates understanding of these issues and agrees with the plan.           No orders of the defined types were placed in this encounter.

## 2024-11-14 RX ORDER — PRIMIDONE 50 MG/1
TABLET ORAL
Qty: 630 TABLET | Refills: 0 | Status: SHIPPED | OUTPATIENT
Start: 2024-11-14

## 2024-11-20 ENCOUNTER — OFFICE VISIT (OUTPATIENT)
Dept: PODIATRY CLINIC | Facility: CLINIC | Age: 84
End: 2024-11-20
Payer: MEDICARE

## 2024-11-20 DIAGNOSIS — H54.8 LEGAL BLINDNESS, AS DEFINED IN USA: ICD-10-CM

## 2024-11-20 DIAGNOSIS — M79.675 PAIN IN TOE OF LEFT FOOT: ICD-10-CM

## 2024-11-20 DIAGNOSIS — M79.674 PAIN OF TOE OF RIGHT FOOT: Primary | ICD-10-CM

## 2024-11-20 DIAGNOSIS — L84 HELOMA DURUM: ICD-10-CM

## 2024-11-20 DIAGNOSIS — M20.41 HAMMER TOE OF RIGHT FOOT: ICD-10-CM

## 2024-11-20 DIAGNOSIS — B35.1 ONYCHOMYCOSIS: ICD-10-CM

## 2024-11-20 PROCEDURE — 99213 OFFICE O/P EST LOW 20 MIN: CPT | Performed by: PODIATRIST

## 2024-11-20 NOTE — PROGRESS NOTES
Lisseth Espana is a 84 year old female.   Chief Complaint   Patient presents with    Toenail Care     Nail trim and foot check          HPI:   Patient returns to the clinic with her  present complaining of long thick dystrophic toenails which when they become elongated they bother her in enclosed shoe gear.  She also complains of pain between the fourth and fifth toes on the left foot and the first and second toes on the right she wears silicone pads she comes in the check those areas to make sure there is no problems.  At today's visit reviewed nurse's history as taken above, allergies medications and medical history as documented below.  All changes duly noted  Allergies: Oxycontin [oxycodone], Adhesive tape, Ampicillin, Cleocin [clindamycin], and Neosporin [neomycin-bacitracin-polymyxin]   Current Outpatient Medications   Medication Sig Dispense Refill    PRIMIDONE 50 MG Oral Tab TAKE 3 TABLETS BY MOUTH EVERY MORNING AND 3 TABLETS EVERY AFTERNOON AT 2PM AND 1 TABLET NIGHTLY 630 tablet 0    RALOXIFENE 60 MG Oral Tab TAKE 1 TABLET(60 MG) BY MOUTH DAILY 90 tablet 3    celecoxib 200 MG Oral Cap Take 1 capsule (200 mg total) by mouth daily. 90 capsule 3    DICYCLOMINE 10 MG Oral Cap TAKE ONE CAPSULE BY MOUTH FOUR TIMES DAILY BEFORE MEALS 360 capsule 0    MYRBETRIQ 50 MG Oral Tablet 24 Hr Take 1 tablet (50 mg total) by mouth daily.      Probiotic Product (PROBIOTIC-10 OR) Take by mouth.      aspirin 81 MG Oral Tab Take 1 tablet (81 mg total) by mouth daily.      Multivitamin Chewtab, ADULT, Oral Chew Tab Chew 1 tablet by mouth daily.      Omega-3 Fatty Acids (FISH OIL) 1000 MG Oral Cap Take 1,000 mg by mouth daily.      Calcium 500 MG Oral Chew Tab Chew 1 tablet by mouth daily.      Cholecalciferol (VITAMIN D) 1000 UNITS Oral Tab Take 1 Tab by mouth daily.        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    Osteoarthrosis, unspecified whether generalized or localized, unspecified site    Visual impairment    no vision in left eye- glasses for right eye      Past Surgical History:   Procedure Laterality Date    Colonoscopy  2004    Colonoscopy N/A 10/23/2014    Procedure: COLONOSCOPY;  Surgeon: Deep Ruggiero MD;  Location:  ENDOSCOPY    D & c  1982    Dil urethra,female,initial  2004    Eye surgery  12/2002    L- optic nerve sx d/t tumor    Gutierrez biopsy stereo nodule 2 site bilat (cpt=19081/39841)      ~1990    Gutierrez localization wire 1 site left (cpt=19281)  2003    neg    Gutierrez localization wire 1 site right (cpt=19281)  1986    neg    Needle biopsy left      benign not sure when    Other surgical history  2/2006    excision of supraclavicle lymphoma    Other surgical history Left 07/2020, 08/2020    BASAL cell carcinoma removal of eyebrow    Tubal ligation  1976      Family History   Problem Relation Age of Onset    Breast Cancer Mother 64        B/L, age 64 then 74    Heart Attack Father     Other (Other) Father     Other (diverticulosis) Brother     Heart Disease Brother     Cancer Daughter         uterine    Stroke Maternal Grandmother     Other (alzheimer's) Maternal Grandfather     Heart Attack Paternal Grandmother     Breast Cancer Maternal Aunt 67    Ovarian Cancer Maternal Cousin Female 60      Social History     Socioeconomic History    Marital status:    Occupational History    Occupation: Retired   Tobacco Use    Smoking status: Never    Smokeless tobacco: Never   Vaping Use    Vaping status: Never Used   Substance and Sexual Activity    Alcohol use: No     Alcohol/week: 0.0 standard drinks of alcohol    Drug use: No   Other Topics Concern    Caffeine Concern No     Comment: 1 cup daily decaf    Exercise No    Seat Belt Yes    Self-Exams Yes     Comment: when remember in shower           REVIEW OF SYSTEMS:   Review of Systems  Today reviewed systens as documented  below  GENERAL HEALTH: feels well otherwise  SKIN: denies any unusual skin lesions or rashes  RESPIRATORY: denies shortness of breath with exertion  CARDIOVASCULAR: denies chest pain on exertion  GI: denies abdominal pain and denies heartburn  NEURO: denies headaches    EXAM:   There were no vitals taken for this visit.  Physical Exam  GENERAL: well developed, well nourished, in no apparent distress  EXTREMITIES:   1. Integument: The nails 2-5 on the right 1-5 on the left are relatively within normal limits but still mildly thickened the second toe on the left does have a little thickening the right hallux nail is thickened but has a significantly improved appearance.  She is wearing the pad she no longer has a painful keratosis on her fourth toe of the left foot the right hallux nail continues to improve slightly.  There are no sores or ulcerations present.     2. Vascular: The patient has palpable dorsalis pedis and posterior tibial pulses bilateral, however posterior tibials a little bit weaker since her last visit   3. Neurologic: The patient has pain sensation intact with no deficits.   4. Musculoskeletal: The patient has good muscle strength there are no deficits noted she has 5 out of 5 against resistance of all muscle groups affecting the foot ankle and lower leg.  Patient gets soft corns between the first and second toes on the right and fourth and fifth on the left.  Currently not present because she has been wearing the silicone pad    ASSESSMENT AND PLAN:   Diagnoses and all orders for this visit:    Pain of toe of right foot    Pain in toe of left foot    Onychomycosis    [place] blindness, [place] defined in [place]    Heloma durum    Hammer toe of right foot        Plan: Today using a nail nippers were trimmed and debrided toenails 1-5 manually and mechanically in girth and width as far down to healthy tissue as possible on both feet.  This totaled 9 toenails.  This was done uneventfully there was no hemorrhage.   There is mild incurvation of the medial and lateral nail borders of the hallux which using a slant back technique were removed and they would not get ingrown.  Again the patient was reminded not to attempt to trim her own toenails because of her visual impairment.  Home care instructions dispensed return to clinic sooner if there is a problem.  Today reviewed proper foot hygiene regular moisturizing  Reviewed proper fitting shoes.  Advised follow-up in about 10 weeks instead of 3 months because her nails were just slightly too long at this visit.  Silicone pads are becoming old not working well dispensed new ones today.  Dispensed silicone spacers between the first and second toes on the right fourth and fifth toes on the left patient was taught proper application.  The patient indicates understanding of these issues and agrees to the plan.      Chan Washington DPM

## 2024-12-06 ENCOUNTER — OFFICE VISIT (OUTPATIENT)
Dept: NEUROLOGY | Facility: CLINIC | Age: 84
End: 2024-12-06
Payer: MEDICARE

## 2024-12-06 VITALS
DIASTOLIC BLOOD PRESSURE: 68 MMHG | SYSTOLIC BLOOD PRESSURE: 130 MMHG | BODY MASS INDEX: 36 KG/M2 | HEART RATE: 88 BPM | WEIGHT: 210.31 LBS | OXYGEN SATURATION: 96 % | RESPIRATION RATE: 16 BRPM

## 2024-12-06 DIAGNOSIS — G25.0 BENIGN ESSENTIAL TREMOR: Primary | ICD-10-CM

## 2024-12-06 PROCEDURE — 99214 OFFICE O/P EST MOD 30 MIN: CPT | Performed by: OTHER

## 2024-12-06 NOTE — PATIENT INSTRUCTIONS
Refill policies:    Allow 2-3 business days for refills; controlled substances may take longer.  Contact your pharmacy at least 5 days prior to running out of medication and have them send an electronic request or submit request through the “request refill” option in your [employer] account.  Refills are not addressed on weekends; covering physicians do not authorize routine medications on weekends.  No narcotics or controlled substances are refilled after noon on Fridays or by on call physicians.  By law, narcotics must be electronically prescribed.  A 30 day supply with no refills is the maximum allowed.  If your prescription is due for a refill, you may be due for a follow up appointment.  To best provide you care, patients receiving routine medications need to be seen at least once a year.  Patients receiving narcotic/controlled substance medications need to be seen at least once every 3 months.  In the event that your preferred pharmacy does not have the requested medication in stock (e.g. Backordered), it is your responsibility to find another pharmacy that has the requested medication available.  We will gladly send a new prescription to that pharmacy at your request.    Scheduling Tests:    If your physician has ordered radiology tests such as MRI or CT scans, please contact Central Scheduling at [phone] right away to schedule the test.  Once scheduled, the [facility] Centralized Referral Team will work with your insurance carrier to obtain pre-certification or prior authorization.  Depending on your insurance carrier, approval may take 3-10 days.  It is highly recommended patients assure they have received an authorization before having a test performed.  If test is done without insurance authorization, patient may be responsible for the entire amount billed.      Precertification and Prior Authorizations:  If your physician has recommended that you have a procedure or additional testing performed the [facility]  Centralized Referral Team will contact your insurance carrier to obtain pre-certification or prior authorization.    You are strongly encouraged to contact your insurance carrier to verify that your procedure/test has been approved and is a COVERED benefit.  Although the [facility] Centralized Referral Team does its due diligence, the insurance carrier gives the disclaimer that \"Although the procedure is authorized, this does not guarantee payment.\"    Ultimately the patient is responsible for payment.   Thank you for your understanding in this matter.  Paperwork Completion:  If you require FMLA or disability paperwork for your recovery, please make sure to either drop it off or have it faxed to our office at [phone]. Be sure the form has your name and date of birth on it.  The form will be faxed to our Forms Department and they will complete it for you.  There is a 25$ fee for all forms that need to be filled out.  Please be aware there is a 10-14 day turnaround time.  You will need to sign a release of information (ELICEO) form if your paperwork does not come with one.  You may call the Forms Department with any questions at [phone].  Their fax number is [phone].

## 2024-12-06 NOTE — PROGRESS NOTES
HPI:    Patient ID: Lisseth Espana is a 84 year old female.    Neurologic Problem  Pertinent negatives include no dizziness.   Dizziness      Patient is a 84 year old right handed female who presents for follow up for essential tremors. Tremors stable  She still continues to get tremors with activities for example writing or eating. She is tolerating current dose of Primidone. Tried weight bands and other simple measures with minimal benefit      Initial history  Ms Lisseth Espana is a 79 year old right handed female who presents for evaluation of tremors. She states she had tremors for about 3 years or more and involves both hands right>left and head and occasionally she gets a chin tremor. She has a strong family history of benign hand tremors. She denies any gait and balance issues. 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 She was following with Neurosurgery in [place] and gets surveillance MRI every 2 years.    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- glasses for right eye      Past Surgical History:   Procedure Laterality Date    Colonoscopy  2004    Colonoscopy N/A 10/23/2014    Procedure: COLONOSCOPY;  Surgeon: Deep Ruggiero MD;  Location:  ENDOSCOPY    D & c  1982    Dil urethra,female,initial  2004    Eye surgery  12/2002    L- optic nerve sx d/t tumor    Gutierrez biopsy stereo nodule 2 site bilat (cpt=19081/38948)      ~1990    Gutierrez localization wire 1 site left (cpt=19281)  2003    neg    Gutierrez localization wire 1 site right (cpt=19281)  1986    neg    Needle biopsy left      benign not sure when    Other surgical history  2/2006    excision of supraclavicle lymphoma     Other surgical history Left 07/2020, 08/2020    BASAL cell carcinoma removal of eyebrow    Tubal ligation  1976      Family History   Problem Relation Age of Onset    Breast Cancer Mother 64        B/L, age 64 then 74    Heart Attack Father     Other (Other) Father     Other (diverticulosis) Brother     Heart Disease Brother     Cancer Daughter         uterine    Stroke Maternal Grandmother     Other (alzheimer's) Maternal Grandfather     Heart Attack Paternal Grandmother     Breast Cancer Maternal Aunt 67    Ovarian Cancer Maternal Cousin Female 60      Social History     Socioeconomic History    Marital status:    Occupational History    Occupation: Retired   Tobacco Use    Smoking status: Never    Smokeless tobacco: Never   Vaping Use    Vaping status: Never Used   Substance and Sexual Activity    Alcohol use: No     Alcohol/week: 0.0 standard drinks of alcohol    Drug use: No   Other Topics Concern    Caffeine Concern No     Comment: 1 cup daily decaf    Exercise No    Seat Belt Yes    Self-Exams Yes     Comment: when remember in shower     Social Drivers of Health     Financial Resource Strain: Low Risk  (2/16/2024)    Received from [facility], [facility]    Financial Resource Strain     In the past year, have you or any family members you live with been unable to get any of the following when it was really needed? Check all that apply.: None   Food Insecurity: Low Risk  (2/16/2024)    Received from [facility], [facility]    Food Insecurity     Within the past 12 months, you worried that your food would run out before you got money to buy more.  : Never true     Within the past 12 months, the food you bought just didn't last and you didn't have money to get more. : Never true   Transportation Needs: Not At Risk (2/16/2024)    Received from [facility], [facility]    Transportation Needs     In the past 12 months, has lack of reliable  transportation kept you from medical appointments, meetings, work or from getting things needed for daily living? : No   Physical Activity: Medium Risk (2/16/2024)    Received from [facility]    Exercise Vital Sign     On average, how many days per week do you engage in moderate to strenuous exercise (like a brisk walk)?: 1 day     On average, how many minutes do you engage in exercise at this level?: 20 min   Stress: Low Risk  (2/16/2024)    Received from [facility], [facility]    Stress     Stress is when someone feels tense, nervous, anxious, or can't sleep at night because their mind is troubled. How stressed are you? : Not at all   Social Connections: Low Risk  (2/16/2024)    Received from [facility], [facility]    Social Connections     How often do you see or talk to people that you care about and feel close to? (For example: talking to friends on the phone, visiting friends or family, going to Je[employer] witness or club meetings): 5 or more times a week          Review of Systems   Constitutional: Negative.    HENT: Negative.     Eyes:  Negative for visual disturbance.   Respiratory: Negative.     Cardiovascular: Negative.    Gastrointestinal: Negative.    Endocrine: Negative.    Genitourinary: Negative.    Musculoskeletal: Negative.    Skin: Negative.    Allergic/Immunologic: Negative.    Neurological:  Positive for tremors. Negative for dizziness.   Hematological: Negative.    Psychiatric/Behavioral: Negative.     All other systems reviewed and are negative.           Current Outpatient Medications   Medication Sig Dispense Refill    PRIMIDONE 50 MG Oral Tab TAKE 3 TABLETS BY MOUTH EVERY MORNING AND 3 TABLETS EVERY AFTERNOON AT 2PM AND 1 TABLET NIGHTLY 630 tablet 0    RALOXIFENE 60 MG Oral Tab TAKE 1 TABLET(60 MG) BY MOUTH DAILY 90 tablet 3    celecoxib 200 MG Oral Cap Take 1 capsule (200 mg total) by mouth daily. 90 capsule 3     DICYCLOMINE 10 MG Oral Cap TAKE ONE CAPSULE BY MOUTH FOUR TIMES DAILY BEFORE MEALS 360 capsule 0    MYRBETRIQ 50 MG Oral Tablet 24 Hr Take 1 tablet (50 mg total) by mouth daily.      Probiotic Product (PROBIOTIC-10 OR) Take by mouth.      aspirin 81 MG Oral Tab Take 1 tablet (81 mg total) by mouth daily.      Multivitamin Chewtab, ADULT, Oral Chew Tab Chew 1 tablet by mouth daily.      Omega-3 Fatty Acids (FISH OIL) 1000 MG Oral Cap Take 1,000 mg by mouth daily.      Calcium 500 MG Oral Chew Tab Chew 1 tablet by mouth daily.      Cholecalciferol (VITAMIN D) 1000 UNITS Oral Tab Take 1 Tab by mouth daily.       Allergies:  Allergies   Allergen Reactions    Oxycontin [Oxycodone] NAUSEA AND VOMITING and DIZZINESS    Adhesive Tape RASH    Ampicillin RASH    Cleocin [Clindamycin] DIARRHEA    Neosporin [Neomycin-Bacitracin-Polymyxin] RASH      PHYSICAL EXAM:   Physical Exam  Blood pressure 130/68, pulse 88, resp. rate 16, weight 210 lb 4.8 oz (95.4 kg), SpO2 96%, not currently breastfeeding.    Vitals reviewed  General: well developed, well nourished  HEENT: Normocephalic and atraumatic.   Cardiovascular: Normal rate, regular rhythm and normal heart sounds.    Pulmonary/Chest: Effort normal and breath sounds normal.   Abdominal: Soft. Bowel sounds are normal.   Skin: Skin is warm and dry.   Psychiatric:  normal mood and affect.   Neurological   Awake, alert and oriented to time, place and person.   Speech is fluent with intact comprehension, repetition and naming.   Normal attention and memory. Higher cortical function intact.  Cranial nerves 2-12: grossly intact except legally blind in left eye  Sensory : Intact to light touch  Motor: Normal tone in all extremities. Right thumb and mild head tremor noted.  Strength is 5/5 in all muscle groups  Reflexes: symmetric and present  Coordination: Intact finger to nose test with postural tremor  Gait: Normal based and steady           ASSESSMENT/PLAN:       ICD-10-CM    1. Benign  essential tremor  G25.0       2. Tremor of both hands  R25.1             Bilateral hand tremors right>left, stable to mild progression    Continue Primidone 150 mg AM, 150 mg noon and 50 mg PM.   Intolerant to Propranolol.    Discussed options, increasing dose of Primidone vs Neuravive treatment  Patient interested in Neuravive procedure but would like to read about it before making decision.       RTC in about 6 months    See orders and medications filed with this encounter. The patient indicates understanding of these issues and agrees with the plan.        No orders of the defined types were placed in this encounter.      Meds This Visit:  Requested Prescriptions      No prescriptions requested or ordered in this encounter       Imaging & Referrals:  None       ID#[id number]

## 2025-02-06 ENCOUNTER — TELEPHONE (OUTPATIENT)
Dept: NEUROLOGY | Facility: CLINIC | Age: 85
End: 2025-02-06

## 2025-02-06 DIAGNOSIS — G25.0 BENIGN ESSENTIAL TREMOR: ICD-10-CM

## 2025-02-06 RX ORDER — PRIMIDONE 50 MG/1
TABLET ORAL
Qty: 630 TABLET | Refills: 1 | Status: SHIPPED | OUTPATIENT
Start: 2025-02-06

## 2025-02-06 NOTE — TELEPHONE ENCOUNTER
Pt requesting refill for PRIMIDONE 50 MG Oral Tab, [facility] #[id number] [place], [place] - [address] [place] [address], [phone], [phone]. Please advise, Pt's best call back number is [phone]. Endorsed to [facility].

## 2025-02-06 NOTE — TELEPHONE ENCOUNTER
Medication: Primidone 50mg     Date of last refill: 11/14/24 (#630/0)  [place] 11[place]14/24 #616     Last office visit: 12/6/24  Due back to clinic per last office note:  6m  Date next office visit scheduled:    Future Appointments   Date Time Provider Department Center   2/19/2025  1:30 PM Chan Washington DPM [id number]   4/14/2025 10:15 AM Homero Hernandez MD [facility]   6/9/2025  1:30 PM Dominga Simms MD [facility]           Last OV note recommendation:    Bilateral hand tremors right>left, stable to mild progression     Continue Primidone 150 mg AM, 150 mg noon and 50 mg PM.   Intolerant to Propranolol.     Discussed options, increasing dose of Primidone vs Neuravive treatment  Patient interested in Neuravive procedure but would like to read about it before making decision.         RTC in about 6 months

## 2025-03-04 ENCOUNTER — OFFICE VISIT (OUTPATIENT)
Dept: PODIATRY CLINIC | Facility: CLINIC | Age: 85
End: 2025-03-04
Payer: MEDICARE

## 2025-03-04 DIAGNOSIS — M79.674 PAIN OF TOE OF RIGHT FOOT: Primary | ICD-10-CM

## 2025-03-04 DIAGNOSIS — L84 HELOMA DURUM: ICD-10-CM

## 2025-03-04 DIAGNOSIS — M20.41 HAMMER TOE OF RIGHT FOOT: ICD-10-CM

## 2025-03-04 DIAGNOSIS — M79.675 PAIN IN TOE OF LEFT FOOT: ICD-10-CM

## 2025-03-04 DIAGNOSIS — B35.1 ONYCHOMYCOSIS: ICD-10-CM

## 2025-03-04 DIAGNOSIS — H54.8 LEGAL BLINDNESS, AS DEFINED IN USA: ICD-10-CM

## 2025-03-04 PROCEDURE — 99213 OFFICE O/P EST LOW 20 MIN: CPT | Performed by: PODIATRIST

## 2025-03-04 NOTE — PROGRESS NOTES
Lisseth Espana is a 84 year old female.   Chief Complaint   Patient presents with    Toenail Care     F/u Toenail Care, declines pain today. Elderly patient unable to trim toenails due to medical condition.         HPI:   Returns to the clinic complaining of long thick dystrophic toenails she cannot provide self-care her  and other family members cannot help her with it.  She does complain of thickened toenails which become uncomfortable in enclosed shoe gear although they are not painful at today's visit.  At today's visit reviewed nurse's history as taken above, allergies medications and medical history as documented below.  All changes duly noted  Allergies: Oxycontin [oxycodone], Adhesive tape, Ampicillin, Cleocin [clindamycin], and Neosporin [neomycin-bacitracin-polymyxin]   Current Outpatient Medications   Medication Sig Dispense Refill    PRIMIDONE 50 MG Oral Tab TAKE 3 TABLETS BY MOUTH EVERY MORNING AND 3 TABLETS EVERY AFTERNOON AT 2PM AND 1 TABLET NIGHTLY 630 tablet 1    RALOXIFENE 60 MG Oral Tab TAKE 1 TABLET(60 MG) BY MOUTH DAILY 90 tablet 3    celecoxib 200 MG Oral Cap Take 1 capsule (200 mg total) by mouth daily. 90 capsule 3    DICYCLOMINE 10 MG Oral Cap TAKE ONE CAPSULE BY MOUTH FOUR TIMES DAILY BEFORE MEALS 360 capsule 0    MYRBETRIQ 50 MG Oral Tablet 24 Hr Take 1 tablet (50 mg total) by mouth daily.      Probiotic Product (PROBIOTIC-10 OR) Take by mouth.      aspirin 81 MG Oral Tab Take 1 tablet (81 mg total) by mouth daily.      Multivitamin Chewtab, ADULT, Oral Chew Tab Chew 1 tablet by mouth daily.      Omega-3 Fatty Acids (FISH OIL) 1000 MG Oral Cap Take 1,000 mg by mouth daily.      Calcium 500 MG Oral Chew Tab Chew 1 tablet by mouth daily.      Cholecalciferol (VITAMIN D) 1000 UNITS Oral Tab Take 1 Tab by mouth daily.        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- glasses for right eye      Past Surgical History:   Procedure Laterality Date    Colonoscopy  2004    Colonoscopy N/A 10/23/2014    Procedure: COLONOSCOPY;  Surgeon: Deep Ruggiero MD;  Location:  ENDOSCOPY    D & c  1982    Dil urethra,female,initial  2004    Eye surgery  12/2002    L- optic nerve sx d/t tumor    Gutierrez biopsy stereo nodule 2 site bilat (cpt=19081/09666)      ~1990    Gutierrez localization wire 1 site left (cpt=19281)  2003    neg    Gutierrez localization wire 1 site right (cpt=19281)  1986    neg    Needle biopsy left      benign not sure when    Other surgical history  2/2006    excision of supraclavicle lymphoma    Other surgical history Left 07/2020, 08/2020    BASAL cell carcinoma removal of eyebrow    Tubal ligation  1976      Family History   Problem Relation Age of Onset    Breast Cancer Mother 64        B/L, age 64 then 74    Heart Attack Father     Other (Other) Father     Other (diverticulosis) Brother     Heart Disease Brother     Cancer Daughter         uterine    Stroke Maternal Grandmother     Other (alzheimer's) Maternal Grandfather     Heart Attack Paternal Grandmother     Breast Cancer Maternal Aunt 67    Ovarian Cancer Maternal Cousin Female 60      Social History     Socioeconomic History    Marital status:    Occupational History    Occupation: Retired   Tobacco Use    Smoking status: Never    Smokeless tobacco: Never   Vaping Use    Vaping status: Never Used   Substance and Sexual Activity    Alcohol use: No     Alcohol/week: 0.0 standard drinks of alcohol    Drug use: No   Other Topics Concern    Caffeine Concern No     Comment: 1 cup daily decaf    Exercise No    Seat Belt Yes    Self-Exams Yes     Comment: when remember in shower           REVIEW OF SYSTEMS:   Review of Systems  Today reviewed systens as documented below  GENERAL  HEALTH: feels well otherwise  SKIN: denies any unusual skin lesions or rashes  RESPIRATORY: denies shortness of breath with exertion  CARDIOVASCULAR: denies chest pain on exertion  GI: denies abdominal pain and denies heartburn  NEURO: denies headaches    EXAM:   There were no vitals taken for this visit.  Physical Exam  GENERAL: well developed, well nourished, in no apparent distress  EXTREMITIES:   1. Integument: The nails 2-5 on the right 1-5 on the left are relatively within normal limits but still mildly thickened the second toe on the left does have a little thickening the right hallux nail is thickened but has a significantly improved appearance.  She is wearing the pad she no longer has a painful keratosis on her fourth toe of the left foot the right hallux nail continues to improve slightly.  There are no sores or ulcerations present.     2. Vascular: The patient has palpable dorsalis pedis and posterior tibial pulses bilateral, however posterior tibials a little bit weaker since her last visit   3. Neurologic: The patient has pain sensation intact with no deficits.   4. Musculoskeletal: The patient has good muscle strength there are no deficits noted she has 5 out of 5 against resistance of all muscle groups affecting the foot ankle and lower leg.  Patient gets soft corns between the first and second toes on the right and fourth and fifth on the left.  Currently not present because she has been wearing the silicone pad    ASSESSMENT AND PLAN:   Diagnoses and all orders for this visit:    Pain of toe of right foot    Pain in toe of left foot    Onychomycosis    Legal blindness, as defined in [place]    Heloma durum    Hammer toe of right foot        Plan: Today using a nail nippers were trimmed and debrided toenails 1-5 manually and mechanically in girth and width as far down to healthy tissue as possible on both feet.  This totaled 9 toenails.  This was done uneventfully there was no hemorrhage.  There is mild  incurvation of the medial and lateral nail borders of the hallux which using a slant back technique were removed and they would not get ingrown.  Again the patient was reminded not to attempt to trim her own toenails because of her visual impairment.  Home care instructions dispensed return to clinic sooner if there is a problem.  Today reviewed proper foot hygiene regular moisturizing  Reviewed proper fitting shoes.  Advised follow-up in about 10 weeks instead of 3 months because her nails were just slightly too long at this visit.  Silicone pads are becoming old not working well dispensed new ones today.  Dispensed silicone spacers between the first and second toes on the right fourth and fifth toes on the left patient was taught proper application.  The patient indicates understanding of these issues and agrees to the plan.      Chan Washington DPM

## 2025-03-06 ENCOUNTER — TELEPHONE (OUTPATIENT)
Dept: INFUSION THERAPY | Age: 85
End: 2025-03-06

## 2025-04-11 ENCOUNTER — TELEPHONE (OUTPATIENT)
Dept: INFUSION THERAPY | Age: 85
End: 2025-04-11

## 2025-04-11 DIAGNOSIS — C82.01 GRADE 1 FOLLICULAR LYMPHOMA OF LYMPH NODES OF NECK  (CMD): Primary | ICD-10-CM

## 2025-04-18 ENCOUNTER — HOSPITAL ENCOUNTER (OUTPATIENT)
Dept: NUCLEAR MEDICINE | Facility: HOSPITAL | Age: 85
Discharge: HOME | End: 2025-04-18
Attending: INTERNAL MEDICINE
Payer: MEDICARE

## 2025-04-18 DIAGNOSIS — C82.01 FOLLICULAR LYMPHOMA GRADE I, LYMPH NODES OF HEAD, FACE, AND NECK (HCC): ICD-10-CM

## 2025-04-18 LAB — GLUCOSE BLD-MCNC: 103 MG/DL (ref 70–99)

## 2025-04-18 PROCEDURE — 78815 PET IMAGE W/CT SKULL-THIGH: CPT | Performed by: INTERNAL MEDICINE

## 2025-04-18 PROCEDURE — 82962 GLUCOSE BLOOD TEST: CPT

## 2025-05-05 ENCOUNTER — OFFICE VISIT (OUTPATIENT)
Dept: HEMATOLOGY/ONCOLOGY | Age: 85
End: 2025-05-05
Attending: INTERNAL MEDICINE

## 2025-05-05 ENCOUNTER — LAB SERVICES (OUTPATIENT)
Dept: LAB | Age: 85
End: 2025-05-05
Attending: INTERNAL MEDICINE

## 2025-05-05 VITALS
HEART RATE: 70 BPM | RESPIRATION RATE: 16 BRPM | HEIGHT: 64 IN | SYSTOLIC BLOOD PRESSURE: 140 MMHG | WEIGHT: 211 LBS | OXYGEN SATURATION: 96 % | BODY MASS INDEX: 36.02 KG/M2 | DIASTOLIC BLOOD PRESSURE: 73 MMHG | TEMPERATURE: 98.3 F

## 2025-05-05 DIAGNOSIS — C82.01 GRADE 1 FOLLICULAR LYMPHOMA OF LYMPH NODES OF NECK  (CMD): Primary | ICD-10-CM

## 2025-05-05 DIAGNOSIS — C82.01 GRADE 1 FOLLICULAR LYMPHOMA OF LYMPH NODES OF NECK  (CMD): ICD-10-CM

## 2025-05-05 LAB
ALBUMIN SERPL-MCNC: 3.7 G/DL (ref 3.4–5)
ALBUMIN/GLOB SERPL: 1.2 {RATIO} (ref 1–2.4)
ALP SERPL-CCNC: 57 UNITS/L (ref 45–117)
ALT SERPL-CCNC: 30 UNITS/L
ANION GAP SERPL CALC-SCNC: 8 MMOL/L (ref 7–19)
AST SERPL-CCNC: 30 UNITS/L
BASOPHILS # BLD: 0.1 K/MCL (ref 0–0.3)
BASOPHILS NFR BLD: 1 %
BILIRUB SERPL-MCNC: 0.2 MG/DL (ref 0.2–1)
BUN SERPL-MCNC: 14 MG/DL (ref 6–20)
BUN/CREAT SERPL: 18 (ref 7–25)
CALCIUM SERPL-MCNC: 8.9 MG/DL (ref 8.4–10.2)
CHLORIDE SERPL-SCNC: 105 MMOL/L (ref 97–110)
CO2 SERPL-SCNC: 27 MMOL/L (ref 21–32)
CREAT SERPL-MCNC: 0.79 MG/DL (ref 0.51–0.95)
DEPRECATED RDW RBC: 43.9 FL (ref 39–50)
EGFRCR SERPLBLD CKD-EPI 2021: 74 ML/MIN/{1.73_M2}
EOSINOPHIL # BLD: 0.2 K/MCL (ref 0–0.5)
EOSINOPHIL NFR BLD: 4 %
ERYTHROCYTE [DISTWIDTH] IN BLOOD: 12.7 % (ref 11–15)
FASTING DURATION TIME PATIENT: ABNORMAL H
GLOBULIN SER-MCNC: 3.2 G/DL (ref 2–4)
GLUCOSE SERPL-MCNC: 106 MG/DL (ref 70–99)
HCT VFR BLD CALC: 40.7 % (ref 36–46.5)
HGB BLD-MCNC: 13.4 G/DL (ref 12–15.5)
IMM GRANULOCYTES # BLD AUTO: 0 K/MCL (ref 0–0.2)
IMM GRANULOCYTES # BLD: 0 %
LDH SERPL L TO P-CCNC: 228 UNITS/L (ref 82–240)
LYMPHOCYTES # BLD: 1.3 K/MCL (ref 1–4)
LYMPHOCYTES NFR BLD: 23 %
MCH RBC QN AUTO: 30.5 PG (ref 26–34)
MCHC RBC AUTO-ENTMCNC: 32.9 G/DL (ref 32–36.5)
MCV RBC AUTO: 92.7 FL (ref 78–100)
MONOCYTES # BLD: 0.4 K/MCL (ref 0.3–0.9)
MONOCYTES NFR BLD: 6 %
NEUTROPHILS # BLD: 3.8 K/MCL (ref 1.8–7.7)
NEUTROPHILS NFR BLD: 66 %
NRBC BLD MANUAL-RTO: 0 /100 WBC
PLATELET # BLD AUTO: 154 K/MCL (ref 140–450)
POTASSIUM SERPL-SCNC: 4.4 MMOL/L (ref 3.4–5.1)
PROT SERPL-MCNC: 6.9 G/DL (ref 6.4–8.2)
RBC # BLD: 4.39 MIL/MCL (ref 4–5.2)
SODIUM SERPL-SCNC: 136 MMOL/L (ref 135–145)
WBC # BLD: 5.8 K/MCL (ref 4.2–11)

## 2025-05-05 PROCEDURE — 80053 COMPREHEN METABOLIC PANEL: CPT

## 2025-05-05 PROCEDURE — 99211 OFF/OP EST MAY X REQ PHY/QHP: CPT

## 2025-05-05 PROCEDURE — 83615 LACTATE (LD) (LDH) ENZYME: CPT

## 2025-05-05 PROCEDURE — 99215 OFFICE O/P EST HI 40 MIN: CPT | Performed by: INTERNAL MEDICINE

## 2025-05-05 PROCEDURE — 85025 COMPLETE CBC W/AUTO DIFF WBC: CPT

## 2025-05-05 ASSESSMENT — ENCOUNTER SYMPTOMS
BLOOD IN STOOL: 0
ADENOPATHY: 0
APPETITE CHANGE: 0
LIGHT-HEADEDNESS: 0
HEADACHES: 0
ABDOMINAL DISTENTION: 0
FEVER: 0
TROUBLE SWALLOWING: 0
DIAPHORESIS: 0
CHEST TIGHTNESS: 0
NAUSEA: 0
CONFUSION: 0
DIZZINESS: 0
WEAKNESS: 0
ACTIVITY CHANGE: 0
VOMITING: 0
VOICE CHANGE: 0
CONSTIPATION: 0
FATIGUE: 0
COUGH: 0
SHORTNESS OF BREATH: 0
BRUISES/BLEEDS EASILY: 0
CHOKING: 0
DIARRHEA: 0
BACK PAIN: 0
UNEXPECTED WEIGHT CHANGE: 0
CHILLS: 0
APNEA: 0
SLEEP DISTURBANCE: 0
SPEECH DIFFICULTY: 0
WHEEZING: 0
ABDOMINAL PAIN: 0

## 2025-05-05 ASSESSMENT — PATIENT HEALTH QUESTIONNAIRE - PHQ9: SUM OF ALL RESPONSES TO PHQ9 QUESTIONS 1 AND 2: 0

## 2025-05-05 ASSESSMENT — PAIN SCALES - GENERAL: PAINLEVEL_OUTOF10: 0

## 2025-05-06 ENCOUNTER — TELEPHONE (OUTPATIENT)
Dept: NEUROLOGY | Facility: CLINIC | Age: 85
End: 2025-05-06

## 2025-05-06 DIAGNOSIS — G25.0 BENIGN ESSENTIAL TREMOR: ICD-10-CM

## 2025-05-06 RX ORDER — PRIMIDONE 50 MG/1
TABLET ORAL
Qty: 630 TABLET | Refills: 1 | OUTPATIENT
Start: 2025-05-06

## 2025-05-06 NOTE — TELEPHONE ENCOUNTER
Confirmed with pharmacy patient has refill remaining.    Medication: Primidone 50mg     Date of last refill: 2/6/25 (#630/1)  [place] 2/6/25 #630, 5/2/22     Last office visit: 12/6/24  Due back to clinic per last office note:  6m  Date next office visit scheduled:    Future Appointments   Date Time Provider Department Center   6/9/2025  1:30 PM Dominga Simms MD [facility]   6/16/2025 10:15 AM Chan Washington DPM [id number]   10/20/2025 10:30 AM Homero Hernandez MD [facility]           Last OV note recommendation:    Bilateral hand tremors right>left, stable to mild progression     Continue Primidone 150 mg AM, 150 mg noon and 50 mg PM.   Intolerant to Propranolol.     Discussed options, increasing dose of Primidone vs Neuravive treatment  Patient interested in Neuravive procedure but would like to read about it before making decision.         RTC in about 6 months

## 2025-05-06 NOTE — TELEPHONE ENCOUNTER
Confirmed with pharmacy patient has refill remaining.    Medication: Primidone 50mg     Date of last refill: 2/6/25 (#630/1)  [place] 2/6/25 #630, 5/2/22     Last office visit: 12/6/24  Due back to clinic per last office note:  6m  Date next office visit scheduled:    Future Appointments   Date Time Provider Department Center   6/9/2025  1:30 PM Dominga Simms MD [facility]   6/16/2025 10:15 AM Chan Washington DPM [id number]   10/20/2025 10:30 AM Homero Hernandez MD [facility]           Last OV note recommendation:    Bilateral hand tremors right>left, stable to mild progression     Continue Primidone 150 mg AM, 150 mg noon and 50 mg PM.   Intolerant to Propranolol.     Discussed options, increasing dose of Primidone vs Neuravive treatment  Patient interested in Neuravive procedure but would like to read about it before making decision.         RTC in about 6 months

## 2025-05-06 NOTE — TELEPHONE ENCOUNTER
Patient calling to request refill of PRIMIDONE 50 MG Oral Tab   Pharmacy   [facility] #[id number] - [address], [phone], [phone]     Patient stated she only have medication for 2 days    Patient informed of 48 hour refill policy excluding weekends and holidays.   Informed patient prescription is sent directly to pharmacy.    Further explained patient will not receive a call back once prescription is ready.

## 2025-05-12 ENCOUNTER — APPOINTMENT (OUTPATIENT)
Dept: LAB | Age: 85
End: 2025-05-12
Attending: INTERNAL MEDICINE

## 2025-05-12 ENCOUNTER — APPOINTMENT (OUTPATIENT)
Dept: HEMATOLOGY/ONCOLOGY | Age: 85
End: 2025-05-12
Attending: INTERNAL MEDICINE

## 2025-06-09 ENCOUNTER — OFFICE VISIT (OUTPATIENT)
Dept: NEUROLOGY | Facility: CLINIC | Age: 85
End: 2025-06-09
Payer: MEDICARE

## 2025-06-09 VITALS
DIASTOLIC BLOOD PRESSURE: 70 MMHG | BODY MASS INDEX: 36 KG/M2 | WEIGHT: 212 LBS | SYSTOLIC BLOOD PRESSURE: 116 MMHG | HEART RATE: 53 BPM | RESPIRATION RATE: 16 BRPM

## 2025-06-09 DIAGNOSIS — R25.1 TREMOR OF BOTH HANDS: ICD-10-CM

## 2025-06-09 DIAGNOSIS — G25.0 BENIGN ESSENTIAL TREMOR: Primary | ICD-10-CM

## 2025-06-09 NOTE — PATIENT INSTRUCTIONS
Refill policies:     Contact your pharmacy at least 5 days prior to running out of medication and have them send an electronic request or submit request through the “request refill” option in your [employer] account.  Allow 3-5 business days for refills; controlled substances may take longer.  If your prescription is due for a refill, please make sure you have a follow up appointment scheduled with the appropriate prescribing physician.  To best provide you care, patients receiving routine medications need to be seen at least once a year.  Patients receiving narcotic/controlled substance medications need to be seen at least once every 3 months.  Patients receiving narcotic/controlled substance medications will be required to sign an Opioid Treatment Agreement/Contract.  Refills will not be refilled on weekends or holidays; on-call physicians will not refill routine medications.  No narcotics or controlled substances are refilled after noon on Fridays or by on-call physicians.  Federal Law states narcotics must be electronically prescribed.  A 30-day supply with no refills is the maximum allowed by law.  In the event that your preferred pharmacy does not have the requested medication in stock (e.g., Backordered), it is your responsibility to find another pharmacy that has the requested medication available.  We will gladly send a new prescription to that pharmacy at your request.

## 2025-06-09 NOTE — PROGRESS NOTES
HPI:    Patient ID: Lisseth Espana is a 84 year old female.    Neurologic Problem  Pertinent negatives include no dizziness.   Dizziness      Patient is a 84 year old right handed female who presents for follow up for essential tremors. Tremors stable  She still continues to get tremors with activities for example writing or eating. She is tolerating current dose of Primidone. Tried weight bands and other simple measures with minimal benefit      Initial history  Ms Lisseth Espana is a 79 year old right handed female who presents for evaluation of tremors. She states she had tremors for about 3 years or more and involves both hands right>left and head and occasionally she gets a chin tremor. She has a strong family history of benign hand tremors. She denies any gait and balance issues. 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 She was following with Neurosurgery in [place] and gets surveillance MRI every 2 years.    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- glasses for right eye      Past Surgical History:   Procedure Laterality Date    Colonoscopy  2004    Colonoscopy N/A 10/23/2014    Procedure: COLONOSCOPY;  Surgeon: Deep Ruggiero MD;  Location:  ENDOSCOPY    D & c  1982    Dil urethra,female,initial  2004    Eye surgery  12/2002    L- optic nerve sx d/t tumor    Gutierrez biopsy stereo nodule 2 site bilat (cpt=19081/28108)      ~1990    Gutierrez localization wire 1 site left (cpt=19281)  2003    neg    Gutierrez localization wire 1 site right (cpt=19281)  1986    neg    Needle biopsy left      benign not sure when    Other surgical history  2/2006    excision of supraclavicle lymphoma     Other surgical history Left 07/2020, 08/2020    BASAL cell carcinoma removal of eyebrow    Tubal ligation  1976      Family History   Problem Relation Age of Onset    Breast Cancer Mother 64        B/L, age 64 then 74    Heart Attack Father     Other (Other) Father     Other (diverticulosis) Brother     Heart Disease Brother     Cancer Daughter         uterine    Stroke Maternal Grandmother     Other (alzheimer's) Maternal Grandfather     Heart Attack Paternal Grandmother     Breast Cancer Maternal Aunt 67    Ovarian Cancer Maternal Cousin Female 60      Social History     Socioeconomic History    Marital status:    Occupational History    Occupation: Retired   Tobacco Use    Smoking status: Never    Smokeless tobacco: Never   Vaping Use    Vaping status: Never Used   Substance and Sexual Activity    Alcohol use: No     Alcohol/week: 0.0 standard drinks of alcohol    Drug use: No   Other Topics Concern    Caffeine Concern No     Comment: 1 cup daily decaf    Exercise No    Seat Belt Yes    Self-Exams Yes     Comment: when remember in shower     Social Drivers of Health     Food Insecurity: Low Risk  (2/16/2024)    Received from [facility]    Food Insecurity     Within the past 12 months, you worried that your food would run out before you got money to buy more.  : Never true     Within the past 12 months, the food you bought just didn't last and you didn't have money to get more. : Never true   Transportation Needs: Not At Risk (2/16/2024)    Received from [facility]    Transportation Needs     In the past 12 months, has lack of reliable transportation kept you from medical appointments, meetings, work or from getting things needed for daily living? : No   Stress: Low Risk  (2/16/2024)    Received from [facility]    Stress     Stress is when someone feels tense, nervous, anxious, or can't sleep at night because their mind is troubled. How stressed are you? : Not at all           Review of Systems   Constitutional: Negative.    HENT: Negative.     Eyes:  Negative for visual disturbance.   Respiratory: Negative.     Cardiovascular: Negative.    Gastrointestinal: Negative.    Endocrine: Negative.    Genitourinary: Negative.    Musculoskeletal: Negative.    Skin: Negative.    Allergic/Immunologic: Negative.    Neurological:  Positive for tremors. Negative for dizziness.   Hematological: Negative.    Psychiatric/Behavioral: Negative.     All other systems reviewed and are negative.           Current Outpatient Medications   Medication Sig Dispense Refill    PRIMIDONE 50 MG Oral Tab TAKE 3 TABLETS BY MOUTH EVERY MORNING AND 3 TABLETS EVERY AFTERNOON AT 2PM AND 1 TABLET NIGHTLY 630 tablet 1    RALOXIFENE 60 MG Oral Tab TAKE 1 TABLET(60 MG) BY MOUTH DAILY 90 tablet 3    celecoxib 200 MG Oral Cap Take 1 capsule (200 mg total) by mouth daily. 90 capsule 3    DICYCLOMINE 10 MG Oral Cap TAKE ONE CAPSULE BY MOUTH FOUR TIMES DAILY BEFORE MEALS 360 capsule 0    MYRBETRIQ 50 MG Oral Tablet 24 Hr Take 1 tablet (50 mg total) by mouth daily.      Probiotic Product (PROBIOTIC-10 OR) Take by mouth.      aspirin 81 MG Oral Tab Take 1 tablet (81 mg total) by mouth daily.      Multivitamin Chewtab, ADULT, Oral Chew Tab Chew 1 tablet by mouth daily.      Omega-3 Fatty Acids (FISH OIL) 1000 MG Oral Cap Take 1,000 mg by mouth daily.      Calcium 500 MG Oral Chew Tab Chew 1 tablet by mouth daily.      Cholecalciferol (VITAMIN D) 1000 UNITS Oral Tab Take 1 Tab by mouth daily.       Allergies:  Allergies   Allergen Reactions    Oxycontin [Oxycodone] NAUSEA AND VOMITING and DIZZINESS    Adhesive Tape RASH    Ampicillin RASH    Cleocin [Clindamycin] DIARRHEA    Neosporin [Neomycin-Bacitracin-Polymyxin] RASH      PHYSICAL EXAM:   Physical Exam  Blood pressure 116/70, pulse 53, resp. rate 16, weight 212 lb (96.2 kg), not currently breastfeeding.    Vitals reviewed  General: well developed, well nourished  HEENT:  Normocephalic and atraumatic.   Cardiovascular: Normal rate, regular rhythm and normal heart sounds.    Pulmonary/Chest: Effort normal and breath sounds normal.   Abdominal: Soft. Bowel sounds are normal.   Skin: Skin is warm and dry.   Psychiatric:  normal mood and affect.   Neurological   Awake, alert and oriented to time, place and person.   Speech is fluent with intact comprehension, repetition and naming.   Normal attention and memory. Higher cortical function intact.  Cranial nerves 2-12: grossly intact except legally blind in left eye  Sensory : Intact to light touch  Motor: Normal tone in all extremities. Right thumb and mild head tremor noted.  Strength is 5/5 in all muscle groups  Reflexes: symmetric and present  Coordination: Intact finger to nose test with postural tremor  Gait: Normal based and steady           ASSESSMENT/PLAN:       ICD-10-CM    1. Benign essential tremor  G25.0       2. Tremor of both hands  R25.1             Bilateral hand tremors right>left and head tremor,  mild progression  Advise to try increasing dose of  Primidone to 200 mg AM, 200 mg noon and 100 mg PM  Intolerant to Propranolol.    Discussed Neuravive option, patient interested, referral place to see  to Dr Lazcano for evaluation    RTC in about 6 months    See orders and medications filed with this encounter. The patient indicates understanding of these issues and agrees with the plan.        No orders of the defined types were placed in this encounter.      Meds This Visit:  Requested Prescriptions      No prescriptions requested or ordered in this encounter       Imaging & Referrals:  None       ID#[id number]

## 2025-06-16 ENCOUNTER — OFFICE VISIT (OUTPATIENT)
Dept: PODIATRY CLINIC | Facility: CLINIC | Age: 85
End: 2025-06-16
Payer: MEDICARE

## 2025-06-16 DIAGNOSIS — M79.674 PAIN OF TOE OF RIGHT FOOT: Primary | ICD-10-CM

## 2025-06-16 DIAGNOSIS — L84 HELOMA DURUM: ICD-10-CM

## 2025-06-16 DIAGNOSIS — H54.8 LEGAL BLINDNESS, AS DEFINED IN USA: ICD-10-CM

## 2025-06-16 DIAGNOSIS — M20.41 HAMMER TOE OF RIGHT FOOT: ICD-10-CM

## 2025-06-16 DIAGNOSIS — C83.32 DIFFUSE LARGE B-CELL LYMPHOMA OF INTRATHORACIC LYMPH NODES (HCC): ICD-10-CM

## 2025-06-16 DIAGNOSIS — M79.675 PAIN IN TOE OF LEFT FOOT: ICD-10-CM

## 2025-06-16 DIAGNOSIS — B35.1 ONYCHOMYCOSIS: ICD-10-CM

## 2025-06-16 PROCEDURE — 99213 OFFICE O/P EST LOW 20 MIN: CPT | Performed by: PODIATRIST

## 2025-06-16 NOTE — PROGRESS NOTES
Lisseth Espana is a 84 year old female.   Chief Complaint   Patient presents with    Toenail Care     Pt in for f/u nail trims         HPI:   Patient presents to the clinic at today's visit for routine care.  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  She is also being treated for lymphoma.  She has thickened toenails which become uncomfortable and ingrown in enclosed shoes she cannot provide self-care.  At today's visit reviewed nurse's history as taken above, allergies medications and medical history as documented below.  All changes duly noted  Allergies: Oxycontin [oxycodone], Adhesive tape, Ampicillin, Cleocin [clindamycin], and Neosporin [neomycin-bacitracin-polymyxin]   Current Medications[1]   Past Medical History[2]   Past Surgical History[3]   Family History[4]   Social Hx on file[5]        REVIEW OF SYSTEMS:   Today reviewed systens as documented below  GENERAL HEALTH: feels well otherwise  SKIN: Refer to exam below  RESPIRATORY: denies shortness of breath with exertion  CARDIOVASCULAR: denies chest pain on exertion  GI: denies abdominal pain and denies heartburn  NEURO: denies headaches    EXAM:   There were no vitals taken for this visit.  GENERAL: well developed, well nourished, in no apparent distress  EXTREMITIES:   1. Integument: The nails 2-5 on the right 1-5 on the left are relatively within normal limits but still mildly thickened the second toe on the left does have a little thickening the right hallux nail is thickened but has a significantly improved appearance.  She is wearing the pad she no longer has a painful keratosis on her fourth toe of the left foot the right hallux nail continues to improve slightly.  There are no sores or ulcerations present.     2. Vascular: The patient has palpable dorsalis pedis and posterior tibial pulses bilateral, however posterior tibials a little bit weaker since her last visit   3. Neurologic:  The patient has pain sensation intact with no deficits.   4. Musculoskeletal: The patient has good muscle strength there are no deficits noted she has 5 out of 5 against resistance of all muscle groups affecting the foot ankle and lower leg.  Patient gets soft corns between the first and second toes on the right and fourth and fifth on the left.  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  This was done uneventfully there was no hemorrhage.  There is mild incurvation of the medial and lateral nail borders of the hallux which using a slant back technique were removed and they would not get ingrown.   Follow-up every 2 to 3 months    The patient indicates understanding of these issues and agrees to the plan.    Chan Washington DPM       [1]   Current Outpatient Medications   Medication Sig Dispense Refill    PRIMIDONE 50 MG Oral Tab TAKE 3 TABLETS BY MOUTH EVERY MORNING AND 3 TABLETS EVERY AFTERNOON AT 2PM AND 1 TABLET NIGHTLY 630 tablet 1    RALOXIFENE 60 MG Oral Tab TAKE 1 TABLET(60 MG) BY MOUTH DAILY 90 tablet 3    celecoxib 200 MG Oral Cap Take 1 capsule (200 mg total) by mouth daily. 90 capsule 3    DICYCLOMINE 10 MG Oral Cap TAKE ONE CAPSULE BY MOUTH FOUR TIMES DAILY BEFORE MEALS 360 capsule 0    MYRBETRIQ 50 MG Oral Tablet 24 Hr Take 1 tablet (50 mg total) by mouth daily.      Probiotic Product (PROBIOTIC-10 OR) Take by mouth.      aspirin 81 MG Oral Tab Take 1 tablet (81 mg total) by mouth daily.      Multivitamin  Chewtab, ADULT, Oral Chew Tab Chew 1 tablet by mouth daily.      Omega-3 Fatty Acids (FISH OIL) 1000 MG Oral Cap Take 1,000 mg by mouth daily.      Calcium 500 MG Oral Chew Tab Chew 1 tablet by mouth daily.      Cholecalciferol (VITAMIN D) 1000 UNITS Oral Tab Take 1 Tab by mouth daily.     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    Osteoarthrosis, unspecified whether generalized or localized, unspecified site    Visual impairment    no vision in left eye- glasses for right eye   [3]   Past Surgical History:  Procedure Laterality Date    Colonoscopy  2004    Colonoscopy N/A 10/23/2014    Procedure: COLONOSCOPY;  Surgeon: Deep Ruggiero MD;  Location:  ENDOSCOPY    D & c  1982    Dil urethra,female,initial  2004    Eye surgery  12/2002    L- optic nerve sx d/t tumor    Gutierrez biopsy stereo nodule 2 site bilat (cpt=19081/40826)      ~1990    Gutierrez localization wire 1 site left (cpt=19281)  2003    neg    Gutierrez localization wire 1 site right (cpt=19281)  1986    neg    Needle biopsy left      benign not sure when    Other surgical history  2/2006    excision of supraclavicle lymphoma    Other surgical history Left 07/2020, 08/2020    BASAL cell carcinoma removal of eyebrow    Tubal ligation  1976   [4]   Family History  Problem Relation Age of Onset    Breast Cancer Mother 64        B/L, age 64 then 74    Heart Attack Father     Other (Other) Father     Other (diverticulosis) Brother     Heart Disease Brother     Cancer Daughter         uterine    Stroke Maternal Grandmother     Other (alzheimer's) Maternal Grandfather     Heart Attack Paternal Grandmother     Breast Cancer Maternal Aunt 67    Ovarian Cancer Maternal Cousin Female 60   [5]   Social History  Socioeconomic History    Marital status:    Occupational History    Occupation: Retired   Tobacco Use    Smoking status:  Never    Smokeless tobacco: Never   Vaping Use    Vaping status: Never Used   Substance and Sexual Activity    Alcohol use: No     Alcohol/week: 0.0 standard drinks of alcohol    Drug use: No   Other Topics Concern    Caffeine Concern No     Comment: 1 cup daily decaf    Exercise No    Seat Belt Yes    Self-Exams Yes     Comment: when remember in shower

## 2025-07-08 DIAGNOSIS — M25.50 ARTHRALGIA, UNSPECIFIED JOINT: ICD-10-CM

## 2025-07-08 DIAGNOSIS — Z80.3 FAMILY HISTORY OF BREAST CANCER IN MOTHER: ICD-10-CM

## 2025-07-08 DIAGNOSIS — M17.11 ARTHRITIS OF RIGHT KNEE: ICD-10-CM

## 2025-07-11 DIAGNOSIS — M17.11 ARTHRITIS OF RIGHT KNEE: ICD-10-CM

## 2025-07-11 DIAGNOSIS — M25.50 ARTHRALGIA, UNSPECIFIED JOINT: ICD-10-CM

## 2025-07-11 RX ORDER — CELECOXIB 200 MG/1
200 CAPSULE ORAL DAILY
Qty: 90 CAPSULE | Refills: 3 | OUTPATIENT
Start: 2025-07-11

## 2025-07-11 NOTE — TELEPHONE ENCOUNTER
For replies, please route to pool: [facility] CENTRAL REFILLS    Please review: medication fails/has no protocol attached.  No future appointments with primary care medicine     Patient completed external lab work    Comprehensive Metabolic Panel (Ordered by Dr. Park Hernandez) - [facility]  Component  Ref Range & Units 5/5/25 12:50 PM   Fasting Status    Sodium  135 - 145 mmol/L 136   Potassium  3.4 - 5.1 mmol/L 4.4   Chloride  97 - 110 mmol/L 105   Carbon Dioxide  21 - 32 mmol/L 27   Anion Gap  7 - 19 mmol/L 8   Glucose  70 - 99 mg/dL 106 High    BUN  6 - 20 mg/dL 14   Creatinine  0.51 - 0.95 mg/dL 0.79   Glomerular Filtration Rate  >=60 74   BUN/Cr  7 - 25 18   Calcium  8.4 - 10.2 mg/dL 8.9   Bilirubin, Total  0.2 - 1.0 mg/dL 0.2   GOT/AST  <=37 Units/L 30   GPT/ALT  <64 Units/L 30   Alkaline Phosphatase  45 - 117 Units/L 57   Albumin  3.4 - 5.0 g/dL 3.7   Protein, Total  6.4 - 8.2 g/dL 6.9   Globulin  2.0 - 4.0 g/dL 3.2   A/G Ratio  1.0 - 2.4 1.2

## 2025-07-12 RX ORDER — RALOXIFENE HYDROCHLORIDE 60 MG/1
60 TABLET, FILM COATED ORAL DAILY
Qty: 90 TABLET | Refills: 3 | Status: SHIPPED | OUTPATIENT
Start: 2025-07-12

## 2025-07-12 RX ORDER — CELECOXIB 200 MG/1
200 CAPSULE ORAL DAILY
Qty: 90 CAPSULE | Refills: 3 | Status: SHIPPED | OUTPATIENT
Start: 2025-07-12

## 2025-07-31 DIAGNOSIS — G25.0 BENIGN ESSENTIAL TREMOR: ICD-10-CM

## 2025-08-01 RX ORDER — PRIMIDONE 50 MG/1
TABLET ORAL
Qty: 630 TABLET | Refills: 1 | Status: SHIPPED | OUTPATIENT
Start: 2025-08-01

## 2025-08-06 ENCOUNTER — TELEPHONE (OUTPATIENT)
Dept: FAMILY MEDICINE CLINIC | Facility: CLINIC | Age: 85
End: 2025-08-06

## 2025-08-06 DIAGNOSIS — E55.9 VITAMIN D DEFICIENCY: Primary | ICD-10-CM

## 2025-08-06 DIAGNOSIS — R73.01 ELEVATED FASTING GLUCOSE: ICD-10-CM

## 2025-08-06 DIAGNOSIS — E78.00 PURE HYPERCHOLESTEROLEMIA: ICD-10-CM

## 2025-08-26 ENCOUNTER — OFFICE VISIT (OUTPATIENT)
Dept: FAMILY MEDICINE CLINIC | Facility: CLINIC | Age: 85
End: 2025-08-26

## 2025-08-26 VITALS
SYSTOLIC BLOOD PRESSURE: 120 MMHG | DIASTOLIC BLOOD PRESSURE: 68 MMHG | WEIGHT: 214 LBS | OXYGEN SATURATION: 95 % | RESPIRATION RATE: 16 BRPM | BODY MASS INDEX: 36.09 KG/M2 | HEIGHT: 64.5 IN | HEART RATE: 82 BPM

## 2025-08-26 DIAGNOSIS — D32.9 MENINGIOMA (HCC): ICD-10-CM

## 2025-08-26 DIAGNOSIS — Z12.31 SCREENING MAMMOGRAM FOR BREAST CANCER: ICD-10-CM

## 2025-08-26 DIAGNOSIS — N60.19 FIBROCYSTIC BREAST DISEASE (FCBD), UNSPECIFIED LATERALITY: ICD-10-CM

## 2025-08-26 DIAGNOSIS — C83.32 DIFFUSE LARGE B-CELL LYMPHOMA OF INTRATHORACIC LYMPH NODES (HCC): ICD-10-CM

## 2025-08-26 DIAGNOSIS — R10.30 LOWER ABDOMINAL PAIN: ICD-10-CM

## 2025-08-26 DIAGNOSIS — Z00.00 ENCOUNTER FOR ANNUAL HEALTH EXAMINATION: Primary | ICD-10-CM

## 2025-08-26 DIAGNOSIS — G25.0 ESSENTIAL TREMOR: ICD-10-CM

## 2025-08-26 DIAGNOSIS — E66.01 SEVERE OBESITY WITH BODY MASS INDEX (BMI) OF 35.0 TO 39.9 WITH SERIOUS COMORBIDITY (HCC): ICD-10-CM

## 2025-08-26 RX ORDER — DICYCLOMINE HYDROCHLORIDE 10 MG/1
10 CAPSULE ORAL 4 TIMES DAILY
Qty: 360 CAPSULE | Refills: 1 | Status: SHIPPED | OUTPATIENT
Start: 2025-08-26

## 2025-08-26 RX ORDER — DESONIDE 0.5 MG/G
1 CREAM TOPICAL 2 TIMES DAILY
Qty: 60 G | Refills: 1 | Status: SHIPPED | OUTPATIENT
Start: 2025-08-26

## (undated) DIAGNOSIS — M25.50 ARTHRALGIA, UNSPECIFIED JOINT: ICD-10-CM

## (undated) DIAGNOSIS — G25.0 BENIGN ESSENTIAL TREMOR: ICD-10-CM

## (undated) DEVICE — Device: Brand: STABLECUT®

## (undated) DEVICE — 2T11 #2 PDO 36 X 36: Brand: 2T11 #2 PDO 36 X 36

## (undated) DEVICE — KENDALL SCD EXPRESS SLEEVES, KNEE LENGTH, MEDIUM: Brand: KENDALL SCD

## (undated) DEVICE — DRAPE,U/SHT,SPLIT,FILM,60X84,STERILE: Brand: MEDLINE

## (undated) DEVICE — BIPOLAR SEALER 23-112-1 AQM 6.0: Brand: AQUAMANTYS™

## (undated) DEVICE — ZIMMER® STERILE DISPOSABLE TOURNIQUET CUFF WITH PLC, DUAL PORT, SINGLE BLADDER, 34 IN. (86 CM)

## (undated) DEVICE — WRAP COOLING KNEE W/ICE PILLOW

## (undated) DEVICE — GOWN SURG AERO CHROME XXL

## (undated) DEVICE — INSTRUMENT FEE

## (undated) DEVICE — STERILE POLYISOPRENE POWDER-FREE SURGICAL GLOVES: Brand: PROTEXIS

## (undated) DEVICE — CONVERTORS STOCKINETTE: Brand: CONVERTORS

## (undated) DEVICE — HOOD, PEEL-AWAY: Brand: FLYTE

## (undated) DEVICE — SOL  .9 1000ML BTL

## (undated) DEVICE — ABDOMINAL PAD: Brand: DERMACEA

## (undated) DEVICE — TOTAL KNEE CDS: Brand: MEDLINE INDUSTRIES, INC.

## (undated) DEVICE — BANDAGE ELASTIC ACE 6\" X-LONG

## (undated) DEVICE — BOWL CEMENT MIX QUICK-VAC

## (undated) DEVICE — CHLORAPREP 26ML APPLICATOR

## (undated) DEVICE — SUTURE VICRYL 2-0 CP-1

## (undated) DEVICE — DRAIN RELIAVAC W/DRN MED 1/8

## (undated) DEVICE — DRESSING AQUACEL AG 3.5X12

## (undated) NOTE — IP AVS SNAPSHOT
Patient Demographics     Address  [address] [place] [address] Phone  [phone] [facility]) *Preferred*  [phone] [facility]) E-mail Address  [email or web address]      Emergency Contact(s)     Name Relation Home Work Mobile    Jay Espana Spouse 6 ? Usually allowed after four to six weeks. Discuss specific work activities with your surgeon. Restrictions  ? For knee replacement surgery, follow instructions provided by physical therapy.   ? Do NOT put a pillow under your knee as it may be more dif physician. ? Review anticoagulant education information sheet provided. Discomfort  ? Surgical discomfort is normal for one to two months. ? Have realistic goals and keep a positive outlook. ?  You may need pain medication regularly (every 4-6 hours) ? An enema or suppository may be needed if above measures do not work. Prevention of infection and promotion of healing  ? Good hand washing is important.  Everyone should wash their hands or use hand  as soon as they walk in your house-whether ? Red streaks on skin near incision. ? Temperature >100.4F.  ? Increased pain at incision not relieved by pain medication. Signs of blood clot  ? Pain, excessive tenderness, redness, or swelling in leg or calf (other than incision site).   (CALL Follow-up Information     Rowan Torres MD. Schedule an appointment as soon as possible for a visit in 2 weeks.     Specialties:  SURGERY, ORTHOPEDIC, Surgery, Orthopaedic  Contact information:  [address] [facility] Recommend that you take daily until you have a bowel movement. Then take daily as needed for constipation.    Evelyne Patino MD         Raloxifene HCl 60 MG Tabs  Commonly known as:  EVISTA  Next dose due:  10/1/17 @ 9 am      Take 1 tablet (60 mg total) [id number] apixaban (ELIQUIS) tab 2.5 mg 09/29/17 2014 Given      [id number] apixaban (ELIQUIS) tab 2.5 mg 09/30/17 0827 Given      [id number] docusate sodium (COLACE) cap 100 mg 09/29/17 2014 Given      [id number] docusate sodium (COLACE) cap 100 mg 09/30/17 H&P signed by Rosi Reyes MD at 9/27/2017 12:32 PM  Version 1 of 1    Author:  Rosi Reyes MD Service:  Orthopedics Author Type:  Physician    Filed:  9/27/2017 12:32 PM Date of Service:  9/27/2017 12:32 PM Status:  Signed    :  Nico Bryant • Heart Attack Father    • Other Aby Guerra Father    • Stroke Maternal Grandmother    • Heart Attack Paternal Grandmother    • Ovarian Cancer Maternal Cousin Female 61   • diverticulosis [OTHER] Brother    • Heart Disease Brother    • alzheimer's Aby Guerra Mat Caroline Doctor  9/27/2017  12:32 PM[TK.1]    Electronically signed by Ryan Gill MD on 9/27/2017 12:32 PM   Attribution Key    TK. 1 - Ryan Gill MD on 9/27/2017 12:32 PM                        Consults - MD Consult Notes      Consults signed Joey Link MD;  Location: [facility] ENDOSCOPY  1982: D & C  2004: KAMAR Arango  12/2002: EYE SURGERY      Comment: L- optic nerve sx d/t tumor  No date: [facility] BIOPSY STEREOTACTIC NODULE 2 SITE BILAT      Comment: ~1990 2003: [facility] NEEDLE LOCA Psyllium (METAMUCIL) 48.57 % Oral Powder Take 1 Packet by mouth daily. Disp:  Rfl:    triamcinolone acetonide (KENALOG) 0.1 % Apply Externally Cream Apply  topically 2 (two) times daily as needed.  Disp: 60 g Rfl: 1       Physical Exam:    /62 (BP Loc We will follow peripherally.   Please call if any questions[MD.2]    Quality:  · DVT Prophylaxis: Eliquis BID  · CODE status: Full  Plan of care discussed with Patient   Saiar Bennett MD  9/28/2017[MD.1]      Electronically signed by Manuel Mercer MD on Joey Link MD;  Location: [facility] ENDOSCOPY  1982: D & C  2004: KAMAR Arango  12/2002: EYE SURGERY      Comment: L- optic nerve sx d/t tumor  No date: [facility] BIOPSY STEREOTACTIC NODULE 2 SITE BILAT      Comment: ~1990 2003: [facility] NEEDLE LOCA PT Approx Degree of Impairment Score: 46.58%   Standardized Score (AM-PAC Scale): 43.63   CMS Modifier (G-Code): CK[CLAY.2]    FUNCTIONAL ABILITY STATUS  Gait Assessment[CLAY.1]   Gait Assistance: Supervision  Distance (ft): 300  Assistive Device: Rolling walk therapy this date. Pt demonstrated limited ROM decreased strength in left knee, which limits functional mobility. Patient continues to function below baseline.  Will continue to benefit by PT BID to maximize functional independence.   [CLAY.3]  DISCHARGE [facility] PHYSICAL THERAPY KNEE EVALUATION - INPATIENT     Room Number: 354/354-A  Evaluation Date: 9/29/2017  Type of Evaluation:[NP.1] Initial[NP.2]  Physician Order: PT Eval and Treat    Presenting Problem: S/p Cemented Left TKA on 09/28/17  Reason for Therapy: M Prior Level of [place]:[NP.1] Patient lives with  in 2 story home. Was independent with ADLs & self care. Ambulated with rollator for past 2-3 weeks.  will be able to assist as needed during recovery @ home. [NP.2]    SUBJECTIVE[NP.1]  \"I -   Moving from lying on back to sitting on the side of the bed?: A Little   How much help from another person does the patient currently need. ..   -   Moving to and from a bed to a chair (including a wheelchair)?: A Little   -   Need to walk in hospital r include[NP.1] h/o Bronchitis[NP.2]. In this PT evaluation, the patient presents with the following impairments[NP.1] decreased ROM & strength in left knee,Pain @ surgical site[NP.2].   Functional outcome measures completed include[NP.1] AM PAC scores[NP.2] Goal Comments: Goals established on 9/29/2017[NP.1]     Attribution Key    NP. 1 - Ronen Story, PT on 9/29/2017 10:06 AM  NP.2 - Ronen Story, PT on 9/29/2017 10:30 AM                        Occupational Therapy Notes (last 72 hours) (Notes from 9/ 1986: [facility] NEEDLE LOCALIZATION W/ SPECIMEN 1 SITE RIG*      Comment: neg  2/2006: OTHER SURGICAL HISTORY      Comment: excision of supraclavicle lymphoma  1976: TUBAL LIGATION    SUBJECTIVE  \"I remember seeing my  use a sock aid before. \"      Isidro Osborn Pt met OT goals. Pt is performing ADL safely at supervision level. Discharge from OT services.       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 3]    SENSATION[MM. 1]  Light touch:  intact[MM. 3]    Communication:[MM.1] wfl[MM. 3]    Behavioral/Emotional/Social:[MM.1] wfl[MM. 3]    RANGE OF MOTION AND STRENGTH ASSESSMENT Pt educated on plan for 1 more OT session to address toileting, socks/shoes,  and grooming. Understanding verbalized. [MM.3]   Patient End of Session: Up in chair;Needs met;Call light within reach;RN aware of session/findings; All patient questions and vanessa Overall Complexity[MM. 1] LOW[MM.3]     OT Discharge Recommendations: Home  OT Device Recommendations: Long-handled shoehorn;Long-handled sponge    PLAN  OT Treatment Plan: Energy conservation/work simplification techniques;ADL training;Functional transfer 10/23/2017 10:00 AM Jose Friend, YARIEL MONROY [facility] GROUP PHYSICAL THERAPY - [place] [facility]    10/25/2017 10:45 AM Jose Friend, YARIEL [address] [facility]    10/26/2017 10:15 AM Dinesh Murguia

## (undated) NOTE — Clinical Note
Please let the patient know that I looked up and was able to find some reports of nightmares with Propranolol. We have reduce the evening dose to 10 mg and if still having nightmares discontinue the evening dose completely.

## (undated) NOTE — MR AVS SNAPSHOT
[address] [facility] [address]  [facility],  [address]  [address] [place] [address] [id number]               Thank you for choosing us for your health care visit with Tatyana Amos MD.  We are glad to serve you and happy to provide you with this s Raloxifene HCl 60 MG Tabs   Take 1 tablet (60 mg total) by mouth once daily. Commonly known as:  EVISTA           triamcinolone acetonide 0.1 % Crea   Apply  topically 2 (two) times daily as needed.    Commonly known as:  KENALOG           Vitamin D 1000 Tips for making healthy food choices  -   Enjoy your food, but eat less. Fully enjoy your food when eating. Don’t eat while distracted and slow down. Avoid over sized portions. Don’t eat while when you’re bored.      EAT THESE FOODS MORE OFTEN: EAT T

## (undated) NOTE — MR AVS SNAPSHOT
[address] [facility] [address]  [facility],  [address]  [address] [place] [address] [phone]               Thank you for choosing us for your health care visit with Mercy Carlton MD.  We are glad to serve you and happy to provide you with this s • History of gestational diabetes or birth of baby weighing more than 9 pounds     Covered at least every 3 years,           GLUCOSE (mg/dL)   Date Value   06/23/2016 90   06/05/2014 88   ---------- Medicare covers annually or at 6-month intervals for pred  Americans over age 72 No flowsheet data found.  OK to schedule if you are in this risk group, make sure you have a referral   Bone Density Screening      Bone density screening   Covered every 2 yrs after age 72    Covered yearly for Long term Gluc Homosexual men   Illicit injectable drug abusers     Tetanus Toxoid- Only covered with a cut with metal- TD and TDaP Not covered by Medicare Part B)   Orders placed or performed in visit on 06/17/15  -TETANUS IMMUNIZATION    This may be covered with your p METAMUCIL 48.57 % Powd   Generic drug:  Psyllium   Take 1 Packet by mouth daily. omega-3 fatty acids 1000 MG Caps   Take  by mouth. Commonly known as:  FISH OIL           PROBIOTIC ADVANCED OR   Take 1 capsule by mouth daily.            Raloxif BEDROOM:  ? Place light switches within reach of your bed and a night light between the bedroom and bathroom. ? Get up slowly from lying down or sitting if you get dizzy. ? Keep a working flashlight near your bed.   STAIRS:  ? Keep stairwells well lit wit

## (undated) NOTE — LETTER
[facility]  Phone: [phone]  Right Fax: [phone]  [place] Devin Walker RN Date: 9/18/17    Patient Name: Tomeka Tye  Surgery Date: 9/28/2017    CSN: [id number]  Medical Record: [id number]   DO

## (undated) NOTE — LETTER
[facility] Testing Department  Phone: [phone]  ANTIBIOTIC ALLERGY/SENSITIVITY/CONTRAINDICATION    Sent By:  Radha Garcia RN Date: 8/22/17    Patient Name: Ethan Stage  Surgery Date: 9/28/2017    CSN: [id number]  Medical Record: [id number] recipient, you are hereby notified that any disclosure, distribution, or copying of this information is strictly prohibited.   If you have received this transmission in error, please notify us immediately by telephone, and return the original documents to u